# Patient Record
Sex: MALE | Race: WHITE | NOT HISPANIC OR LATINO | Employment: OTHER | URBAN - METROPOLITAN AREA
[De-identification: names, ages, dates, MRNs, and addresses within clinical notes are randomized per-mention and may not be internally consistent; named-entity substitution may affect disease eponyms.]

---

## 2017-02-01 ENCOUNTER — OFFICE VISIT (OUTPATIENT)
Dept: CARDIOLOGY | Facility: CLINIC | Age: 72
End: 2017-02-01

## 2017-02-01 VITALS
HEART RATE: 77 BPM | WEIGHT: 198.4 LBS | BODY MASS INDEX: 28.4 KG/M2 | HEIGHT: 70 IN | SYSTOLIC BLOOD PRESSURE: 160 MMHG | DIASTOLIC BLOOD PRESSURE: 84 MMHG

## 2017-02-01 DIAGNOSIS — I48.0 PAROXYSMAL ATRIAL FIBRILLATION (HCC): ICD-10-CM

## 2017-02-01 DIAGNOSIS — I25.9 ISCHEMIC HEART DISEASE: ICD-10-CM

## 2017-02-01 DIAGNOSIS — I25.810 ATHEROSCLEROSIS OF CORONARY ARTERY BYPASS GRAFT OF NATIVE HEART WITHOUT ANGINA PECTORIS: Primary | ICD-10-CM

## 2017-02-01 DIAGNOSIS — E78.5 HYPERLIPIDEMIA, UNSPECIFIED HYPERLIPIDEMIA TYPE: ICD-10-CM

## 2017-02-01 DIAGNOSIS — I48.0 PAF (PAROXYSMAL ATRIAL FIBRILLATION) (HCC): ICD-10-CM

## 2017-02-01 DIAGNOSIS — I10 ESSENTIAL HYPERTENSION: ICD-10-CM

## 2017-02-01 PROCEDURE — 99213 OFFICE O/P EST LOW 20 MIN: CPT | Performed by: INTERNAL MEDICINE

## 2017-02-01 RX ORDER — ASCORBIC ACID 500 MG
1000 TABLET ORAL DAILY
COMMUNITY
End: 2019-02-06

## 2017-02-01 RX ORDER — PRAVASTATIN SODIUM 80 MG/1
80 TABLET ORAL DAILY
Qty: 90 TABLET | Refills: 2 | Status: SHIPPED | OUTPATIENT
Start: 2017-02-01 | End: 2017-10-27 | Stop reason: SDUPTHER

## 2017-02-01 RX ORDER — METOPROLOL SUCCINATE 50 MG/1
50 TABLET, EXTENDED RELEASE ORAL DAILY
Qty: 90 TABLET | Refills: 2 | Status: SHIPPED | OUTPATIENT
Start: 2017-02-01 | End: 2017-04-14 | Stop reason: SDUPTHER

## 2017-02-01 NOTE — PROGRESS NOTES
Subjective:     Encounter Date:02/01/2017      Patient ID: Fidencio Zarate Jr. is a 71 y.o.  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     INTERNIST: Marlo King MD, [facility]  ONCOLOGIST: Fawad Zelaya MD  OTOLARYNGOLOGIST: Barrie Solano MD  RADIATION ONCOLOGIST: Kenneth Rodriguez MD  PAIN MANAGEMENT: Toni Pollock MD  SURGEON: Son Andrade MD  INTERVENTIONAL CARDIOLOGIST: Son Lam MD, [facility], [facility]    Chief Complaint:   Chief Complaint   Patient presents with   • Follow-up     Problem List:  1. Ischemic heart disease:  a. Remote acute inferolateral myocardial infarction with apparent severe single vessel obstructive coronary disease and preserved left ventricular function--data deficit, October 1984.   b. Recurrent myocardial infarction--data deficit, 1989 with subsequent recurrent progressive chest pain syndrome and aortocoronary bypass graft placement x6--data deficit, January 1990.  c. Remote acceptable nondiagnostic GXT without symptoms, March 1995.   d. Progressive chest pain syndrome with diagnostic coronary arteriography demonstrating 3-vessel obstructive coronary disease with proximal segment occlusion with incomplete visualization of the LIMA-LAD and high grade stenosis of the vein graft of the first diagonal branch of the left anterior descending artery with occluded vein graft to the left circumflex coronary artery with patent graft to the distal PDA with mild left ventricular enlargement and mild-moderate inferobasal hypokinesis with angioplasty and stent deployment of the diagonal branch of the LAD, [facility], May 2007.  e. Abnormal acceptable combination Doppler echocardiogram, LVEF (0.55) with abnormal acceptable 24-hour Holter monitor, autumn 2009.  f. Recent progressive CCS class II-III chest pain syndrome with NYHA class II exertional dyspnea and diagnostic coronary angiography demonstrating normal left ventricular function with severe three vessel  obstructive disease and patent LIMA-LAD with patent stents vein graft to the D1-LAD and patent vein graft to the RCA with chronically occluded circumflex vein graft with adequate right and left collaterals of the left circumflex coronary artery with coronary anatomy remaining unchanged from remote intervention, 2007.   g. Residual class I symptoms.  h. Left heart cath, 06/17/2016, Dr. Son Lam, with normal hemodynamics, moderate reduction in LVEF (0.40) with severe 3 vessel obstructive coronary disease with occlusion of all coronary arteries and patent LIMA and SVG x2 with continued medical therapy felt warranted.  i. Echocardiogram 10/6/2016; LVEF 0.50, LV wall segment contract abnormally, RV moderately dilated, LA moderately dilated, mild concentric hypertrophy, LV moderately dilated, no evidence of pericardial effusion, RVSP less than 35 mmHg  j. Residual class I symptoms.  2. Chronic hypertension--probably essential with recent labile blood pressure readings.   3. Dyslipidemia.   4. Chronic tobacco abuse.  5. Mild obesity, resolved.  6. Chronic lower tract obstructive symptoms--probable BPH.  7. Hyperuricemia with intermittent gout.   8. Vitamin D deficiency.   9. Chronic low back pain with neurosurgical evaluation.   10. Erectile dysfunction.   11. Elevated serum glucose--possible type 2 pre-diabetes mellitus.  12. Remote operations:  a. Right foot surgery, 1962.   b. Appendectomy.  c. Coronary artery bypass graft x6 - 1990.   d. Cataract removal with lens implant, [facility], 2009.  e. Cholecystectomy July 2016  13. Recent left neck mass with abnormal PET scan demonstrating 3.5 cm. x 2.5 cm. mass with adjacent 9 mm lymph node, hypermetabolic, with contemplated radiation/chemotherapy vs. resection, January 2011.  14. Noncompliance, resolved.   15. Recent progressive disabling headache syndrome felt to be secondary to cluster headaches with headache specialist evaluation, MRI and ophthalmology assessment x2 - data  "deficit, June-December 2015.  16. Recent progressive nausea and chest pain syndrome with apparent acceptable diagnostic coronary angiography and ERCP followed by laparoscopic cholecystectomy and liver biopsy, 07/08/2016.     Allergies   Allergen Reactions   • Penicillins Rash and Other (See Comments)     \"flu-like\" symptoms   • Crestor [Rosuvastatin Calcium] Other (See Comments) and Myalgia     Arthralgias   • Lipitor [Atorvastatin] Other (See Comments) and Myalgia     Arthralgias   • Zocor [Simvastatin] Other (See Comments) and Myalgia     Arthralgias         Current Outpatient Prescriptions:   •  allopurinol (ZYLOPRIM) 300 MG tablet, Take 300 mg by mouth daily., Disp: , Rfl:   •  amLODIPine (NORVASC) 10 MG tablet, Take 10 mg by mouth daily., Disp: , Rfl:   •  apixaban (ELIQUIS) 5 MG tablet tablet, Take 1 tablet by mouth 2 (Two) Times a Day., Disp: 180 tablet, Rfl: 3  •  aspirin 81 MG chewable tablet, Chew 81 mg daily., Disp: , Rfl:   •  Cholecalciferol (VITAMIN D3) 5000 UNITS capsule capsule, Take 5,000 Units by mouth daily., Disp: , Rfl:   •  citalopram (CeleXA) 20 MG tablet, Take 20 mg by mouth daily., Disp: , Rfl:   •  fentaNYL (DURAGESIC) 75 MCG/HR patch, Place 1 patch on the skin every third day., Disp: , Rfl:   •  gabapentin (NEURONTIN) 300 MG capsule, Take 300 mg by mouth 3 (three) times a day., Disp: , Rfl:   •  hydrochlorothiazide (HYDRODIURIL) 25 MG tablet, Take 25 mg by mouth daily., Disp: , Rfl:   •  HYDROcodone-acetaminophen (LORTAB)  MG per tablet, Take 1 tablet by mouth every 6 (six) hours as needed for moderate pain (4-6)., Disp: , Rfl:   •  levothyroxine (SYNTHROID, LEVOTHROID) 50 MCG tablet, Take 50 mcg by mouth daily., Disp: , Rfl:   •  LUBRICANT EYE DROPS 0.4-0.3 % solution ophthalmic solution, Administer 1 drop to both eyes at night as needed., Disp: , Rfl:   •  metoprolol succinate XL (TOPROL-XL) 25 MG 24 hr tablet, Take 1 tablet by mouth daily., Disp: 30 tablet, Rfl: 3  •  naproxen " "sodium (ALEVE) 220 MG tablet, Take 440 mg by mouth at night as needed for mild pain (1-3)., Disp: , Rfl:   •  nitroglycerin (NITROSTAT) 0.4 MG SL tablet, Place 0.4 mg under the tongue as needed., Disp: , Rfl:   •  omeprazole (PriLOSEC) 20 MG capsule, Take 20 mg by mouth daily., Disp: , Rfl:   •  pravastatin (PRAVACHOL) 80 MG tablet, Take 80 mg by mouth daily., Disp: , Rfl:   •  tamsulosin (FLOMAX) 0.4 MG capsule 24 hr capsule, Take 1 capsule by mouth every night., Disp: , Rfl:   •  vitamin B-12 (CYANOCOBALAMIN) 1000 MCG tablet, Take 1,000 mcg by mouth daily., Disp: , Rfl:     History of Present Illness Patient returns for scheduled 6-month followup.  Patient denies any chest discomfort, shortness of breath, fatigue, edema, palpitations, presyncope.  He states that since he last saw as he has had gallstones, had his gallbladder removed, had a colonoscopy but no polyp removal.  He has no difficulties eating, no nausea, vomiting, diarrhea, abdominal pain.  He had tried 2 rounds of Repatha but it is too expensive so he went back to pravastatin daily.  He needs a refill today.  No tobacco or NTG use.  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  He has no current GI or  difficulty.      ROS   Obtained and otherwise negative except as outlined in problem list and HPI.    Procedures       Objective:       Vitals:    02/01/17 1307 02/01/17 1309   BP: 161/95 160/84   BP Location: Left arm Left arm   Patient Position: Sitting Standing   Pulse: 77 77   Weight: 198 lb 6.4 oz (90 kg)    Height: 69.5\" (176.5 cm)    Recheck blood pressure left arm sitting 140/62  Body mass index is 28.88 kg/(m^2).   Last weight:  189 lbs.    Physical Exam   Constitutional: He appears well-developed and well-nourished.   HENT:   Head: Normocephalic and atraumatic.   Mouth/Throat: Oropharynx is clear and moist.   Neck: Neck supple. No JVD present. Carotid bruit is not present. No thyromegaly present.   Cardiovascular: Normal rate.  An irregular rhythm " present.  Occasional extrasystoles are present. Exam reveals no gallop, no S3 and no friction rub.    Murmur heard.   Medium-pitched early systolic murmur is present with a grade of 2/6  at the lower left sternal border  Pulses:       Carotid pulses are 1+ on the right side, and 1+ on the left side.       Radial pulses are 1+ on the right side, and 1+ on the left side.        Femoral pulses are 1+ on the right side, and 1+ on the left side.       Popliteal pulses are 1+ on the right side, and 1+ on the left side.        Dorsalis pedis pulses are 1+ on the right side, and 1+ on the left side.        Posterior tibial pulses are 1+ on the right side, and 1+ on the left side.   Pulmonary/Chest: Effort normal and breath sounds normal.   Abdominal: Soft. He exhibits no mass. There is no hepatosplenomegaly. There is no tenderness.   Lymphadenopathy:     He has no cervical adenopathy.   Neurological: He is alert. He has normal strength. No cranial nerve deficit or sensory deficit.   Skin: Skin is warm, dry and intact.       Lab Review:   Lab Results   Component Value Date    GLUCOSE 109 (H) 06/16/2016    BUN 18 06/16/2016    CREATININE 1.00 06/16/2016    EGFRIFNONA 74 06/16/2016    BCR 18.0 06/16/2016    CO2 28.0 06/16/2016    CALCIUM 9.3 06/16/2016    ALBUMIN 3.60 06/17/2016    LABIL2 1.3 06/16/2016    AST 49 (H) 06/17/2016    ALT 34 06/17/2016       Lab Results   Component Value Date    WBC 4.54 07/07/2016    HGB 13.0 (L) 07/07/2016    HCT 38.3 (L) 07/07/2016    MCV 92.5 07/07/2016     07/07/2016       Lab Results   Component Value Date    HGBA1C 5.50 06/16/2016       Lab Results   Component Value Date    TSH 1.799 06/16/2016       Lab Results   Component Value Date    CHOL 151 06/16/2016     Lab Results   Component Value Date    TRIG 85 06/16/2016     Lab Results   Component Value Date    HDL 46 06/16/2016   LDL 64      Assessment:   Overall continued acceptable course with no interim cardiopulmonary complaints  with good functional status. We will defer additional diagnostic or therapeutic intervention from a cardiac perspective at this time.  We'll increase his Toprol XL to 50 mg daily to help with hypertension as well as his paroxysmal atrial fibrillation.       Diagnosis Plan   1. Atherosclerosis of coronary artery bypass graft of native heart without angina pectoris     2. Essential hypertension     3. Hyperlipidemia, unspecified hyperlipidemia type     4. Ischemic heart disease     5. PAF (paroxysmal atrial fibrillation)            Plan:         1. Patient to continue current medications and close follow up with the above providers.  2. Tentative cardiology follow up in 6 months or patient may return sooner PRN.  3. Refill pravastatin  4. Increase Toprol XL to 50 mg daily       Scribed by Reyna Gonzalez, SARAH SCANLON, Darien Rodrigues MD, [facility], personally performed the services described in this documentation as scribed by the above named individual in my presence, and it is both accurate and complete. At 3:09 PM on 02/01/2017

## 2017-04-07 ENCOUNTER — HOSPITAL ENCOUNTER (EMERGENCY)
Facility: HOSPITAL | Age: 72
Discharge: HOME | End: 2017-04-07
Attending: EMERGENCY MEDICINE | Admitting: EMERGENCY MEDICINE

## 2017-04-07 ENCOUNTER — APPOINTMENT (OUTPATIENT)
Dept: GENERAL RADIOLOGY | Facility: HOSPITAL | Age: 72
End: 2017-04-07

## 2017-04-07 ENCOUNTER — TELEPHONE (OUTPATIENT)
Dept: CARDIOLOGY | Facility: CLINIC | Age: 72
End: 2017-04-07

## 2017-04-07 VITALS
TEMPERATURE: 97.6 F | OXYGEN SATURATION: 91 % | RESPIRATION RATE: 15 BRPM | HEIGHT: 69 IN | WEIGHT: 205 LBS | HEART RATE: 46 BPM | BODY MASS INDEX: 30.36 KG/M2 | DIASTOLIC BLOOD PRESSURE: 96 MMHG | SYSTOLIC BLOOD PRESSURE: 143 MMHG

## 2017-04-07 DIAGNOSIS — Z86.79 HISTORY OF CORONARY ARTERY DISEASE: ICD-10-CM

## 2017-04-07 DIAGNOSIS — I50.21 ACUTE SYSTOLIC CONGESTIVE HEART FAILURE (HCC): Primary | ICD-10-CM

## 2017-04-07 DIAGNOSIS — R07.89 ATYPICAL CHEST PAIN: ICD-10-CM

## 2017-04-07 LAB
ALBUMIN SERPL-MCNC: 4.5 G/DL (ref 3.2–4.8)
ALBUMIN/GLOB SERPL: 1.5 G/DL (ref 1.5–2.5)
ALP SERPL-CCNC: 57 U/L (ref 25–100)
ALT SERPL W P-5'-P-CCNC: 5 U/L (ref 7–40)
ANION GAP SERPL CALCULATED.3IONS-SCNC: 6 MMOL/L (ref 3–11)
AST SERPL-CCNC: 23 U/L (ref 0–33)
BASOPHILS # BLD AUTO: 0.05 10*3/MM3 (ref 0–0.2)
BASOPHILS NFR BLD AUTO: 0.6 % (ref 0–1)
BILIRUB SERPL-MCNC: 0.6 MG/DL (ref 0.3–1.2)
BNP SERPL-MCNC: 541 PG/ML (ref 0–100)
BUN BLD-MCNC: 12 MG/DL (ref 9–23)
BUN/CREAT SERPL: 9.2 (ref 7–25)
CALCIUM SPEC-SCNC: 9.8 MG/DL (ref 8.7–10.4)
CHLORIDE SERPL-SCNC: 97 MMOL/L (ref 99–109)
CO2 SERPL-SCNC: 30 MMOL/L (ref 20–31)
CREAT BLD-MCNC: 1.3 MG/DL (ref 0.6–1.3)
DEPRECATED RDW RBC AUTO: 53.2 FL (ref 37–54)
EOSINOPHIL # BLD AUTO: 0.23 10*3/MM3 (ref 0.1–0.3)
EOSINOPHIL NFR BLD AUTO: 2.8 % (ref 0–3)
ERYTHROCYTE [DISTWIDTH] IN BLOOD BY AUTOMATED COUNT: 15.3 % (ref 11.3–14.5)
GFR SERPL CREATININE-BSD FRML MDRD: 55 ML/MIN/1.73
GLOBULIN UR ELPH-MCNC: 3.1 GM/DL
GLUCOSE BLD-MCNC: 114 MG/DL (ref 70–100)
HCT VFR BLD AUTO: 37.5 % (ref 38.9–50.9)
HGB BLD-MCNC: 12.1 G/DL (ref 13.1–17.5)
HOLD SPECIMEN: NORMAL
HOLD SPECIMEN: NORMAL
IMM GRANULOCYTES # BLD: 0.02 10*3/MM3 (ref 0–0.03)
IMM GRANULOCYTES NFR BLD: 0.2 % (ref 0–0.6)
LYMPHOCYTES # BLD AUTO: 1.85 10*3/MM3 (ref 0.6–4.8)
LYMPHOCYTES NFR BLD AUTO: 22.3 % (ref 24–44)
MAGNESIUM SERPL-MCNC: 2.2 MG/DL (ref 1.3–2.7)
MCH RBC QN AUTO: 30.5 PG (ref 27–31)
MCHC RBC AUTO-ENTMCNC: 32.3 G/DL (ref 32–36)
MCV RBC AUTO: 94.5 FL (ref 80–99)
MONOCYTES # BLD AUTO: 0.91 10*3/MM3 (ref 0–1)
MONOCYTES NFR BLD AUTO: 11 % (ref 0–12)
NEUTROPHILS # BLD AUTO: 5.23 10*3/MM3 (ref 1.5–8.3)
NEUTROPHILS NFR BLD AUTO: 63.1 % (ref 41–71)
PLATELET # BLD AUTO: 188 10*3/MM3 (ref 150–450)
PMV BLD AUTO: 9.7 FL (ref 6–12)
POTASSIUM BLD-SCNC: 4.2 MMOL/L (ref 3.5–5.5)
PROT SERPL-MCNC: 7.6 G/DL (ref 5.7–8.2)
RBC # BLD AUTO: 3.97 10*6/MM3 (ref 4.2–5.76)
SODIUM BLD-SCNC: 133 MMOL/L (ref 132–146)
TROPONIN I SERPL-MCNC: 0 NG/ML (ref 0–0.07)
WBC NRBC COR # BLD: 8.29 10*3/MM3 (ref 3.5–10.8)
WHOLE BLOOD HOLD SPECIMEN: NORMAL
WHOLE BLOOD HOLD SPECIMEN: NORMAL

## 2017-04-07 PROCEDURE — 80053 COMPREHEN METABOLIC PANEL: CPT | Performed by: EMERGENCY MEDICINE

## 2017-04-07 PROCEDURE — 85025 COMPLETE CBC W/AUTO DIFF WBC: CPT | Performed by: EMERGENCY MEDICINE

## 2017-04-07 PROCEDURE — 71010 HC CHEST PA OR AP: CPT

## 2017-04-07 PROCEDURE — 83735 ASSAY OF MAGNESIUM: CPT | Performed by: EMERGENCY MEDICINE

## 2017-04-07 PROCEDURE — 83880 ASSAY OF NATRIURETIC PEPTIDE: CPT | Performed by: EMERGENCY MEDICINE

## 2017-04-07 PROCEDURE — 99284 EMERGENCY DEPT VISIT MOD MDM: CPT

## 2017-04-07 PROCEDURE — 84484 ASSAY OF TROPONIN QUANT: CPT

## 2017-04-07 PROCEDURE — 93005 ELECTROCARDIOGRAM TRACING: CPT

## 2017-04-07 RX ORDER — FUROSEMIDE 20 MG/1
TABLET ORAL
Qty: 30 TABLET | Refills: 0 | Status: SHIPPED | OUTPATIENT
Start: 2017-04-07 | End: 2017-08-09

## 2017-04-07 RX ORDER — FUROSEMIDE 40 MG/1
40 TABLET ORAL ONCE
Status: COMPLETED | OUTPATIENT
Start: 2017-04-07 | End: 2017-04-07

## 2017-04-07 RX ORDER — SODIUM CHLORIDE 0.9 % (FLUSH) 0.9 %
10 SYRINGE (ML) INJECTION AS NEEDED
Status: DISCONTINUED | OUTPATIENT
Start: 2017-04-07 | End: 2017-04-07 | Stop reason: HOSPADM

## 2017-04-07 RX ADMIN — FUROSEMIDE 40 MG: 40 TABLET ORAL at 16:58

## 2017-04-07 NOTE — TELEPHONE ENCOUNTER
Patient is having labored breathing and wife wants  to go to ER. Patient wants an appointment to see Dr. Rodrigues today instead. Patient not willing to go to the ER and states it is not too bad. Patient is remarkably short of breath over the phone. Patient given phone number to Afib clinic but strongly advised to go to ER.

## 2017-04-07 NOTE — ED PROVIDER NOTES
"Subjective   HPI Comments: 71 y.o. male presents to the ED w/ c/o SOA starting about 1 week ago. Pt has a history of a-fib on Eliquis. He states over the last week he has felt very SOA, which is worse with lying down. He describes a pressure in his central chest that is also present when he lies down. He has had a cough for about 2 weeks.    Pt called a-fib clinic today. They were unable to see him today and he was advised to present to the ED for further evaluation.    Patient is a 71 y.o. male presenting with shortness of breath.   History provided by:  Patient  Shortness of Breath   Severity:  Severe  Onset quality:  Gradual  Duration:  1 week  Timing:  Constant  Progression:  Waxing and waning  Chronicity:  New  Relieved by:  Nothing  Exacerbated by: lying down.  Ineffective treatments:  None tried  Associated symptoms: chest pain (pressure) and cough    Associated symptoms: no diaphoresis, no fever, no sore throat and no vomiting        Review of Systems   Constitutional: Negative for chills, diaphoresis and fever.   HENT: Negative for sore throat.    Respiratory: Positive for cough, chest tightness and shortness of breath.    Cardiovascular: Positive for chest pain (pressure).   Gastrointestinal: Negative for vomiting.   All other systems reviewed and are negative.      Past Medical History:   Diagnosis Date   • Arthritis    • Cancer     thoat cancer   • Coronary artery disease    • Hyperlipidemia    • Hypertension    • Injury of back    • Low back pain    • Myocardial infarction     x2   • PAF (paroxysmal atrial fibrillation)    • Uses hearing aid     bilat        Allergies   Allergen Reactions   • Penicillins Rash and Other (See Comments)     \"flu-like\" symptoms   • Crestor [Rosuvastatin Calcium] Other (See Comments) and Myalgia     Arthralgias   • Lipitor [Atorvastatin] Other (See Comments) and Myalgia     Arthralgias   • Zocor [Simvastatin] Other (See Comments) and Myalgia     Arthralgias       Past " Surgical History:   Procedure Laterality Date   • APPENDECTOMY     • CARDIAC CATHETERIZATION      x1 stent   • CARDIAC CATHETERIZATION N/A 6/17/2016    Procedure: Left Heart Cath;  Surgeon: Son Lam MD;  Location:  [facility];  Service:    • CARDIAC SURGERY     • CATARACT EXTRACTION  2009   • CHOLECYSTECTOMY LAPAROSCOPIC INTRAOPERATIVE CHOLANGIOGRAM WITH LIVER BIOPSY N/A 7/8/2016    Procedure: CHOLECYSTECTOMY LAPAROSCOPIC WITH LIVER BIOPSY;  Surgeon: Son Andrade MD;  Location:  [facility];  Service:    • COLONOSCOPY      x2   • FOOT SURGERY  1962    right    • LASIK     • [place] ERCP DX COLLECTION SPECIMEN BRUSHING/WASHING N/A 6/24/2016    Procedure: ENDOSCOPIC RETROGRADE CHOLANGIOPANCREATOGRAPHY;  Surgeon: Omari Cardenas MD;  Location:  [facility];  Service: Gastroenterology   • TONSILLECTOMY     • WISDOM TOOTH EXTRACTION         Family History   Problem Relation Age of Onset   • COPD Mother        Social History     Social History   • Marital status:      Spouse name: N/A   • Number of children: N/A   • Years of education: N/A     Social History Main Topics   • Smoking status: Former Smoker     Packs/day: 1.50     Years: 50.00     Types: Cigarettes     Quit date: 6/15/2010   • Smokeless tobacco: Never Used   • Alcohol use Yes      Comment: beer occasional   • Drug use: No   • Sexual activity: Defer     Other Topics Concern   • None     Social History Narrative         Objective   Physical Exam   Constitutional: He is oriented to person, place, and time. He appears well-developed and well-nourished. No distress.   HENT:   Head: Normocephalic and atraumatic.   Eyes: Conjunctivae are normal. Pupils are equal, round, and reactive to light.   Neck: Normal range of motion. Neck supple. No JVD present.   Cardiovascular:   Murmur (2/6 systolic murmur) heard.  Pulmonary/Chest: Effort normal and breath sounds normal. No respiratory distress. He has no wheezes. He has  no rales. He exhibits no tenderness.   Abdominal: Soft. Bowel sounds are normal. There is no tenderness.   Musculoskeletal: He exhibits edema (trace pitting edema bilaterally).   Neurological: He is alert and oriented to person, place, and time.   Skin: Skin is warm and dry. He is not diaphoretic.   Psychiatric: He has a normal mood and affect. His behavior is normal.   Nursing note and vitals reviewed.      Procedures         ED Course  ED Course   Comment By Time   I spoke with Dr. Potter who is on-call for cardiology.  We reviewed his echocardiogram in October and his catheter report from June.  Dr. Potter recommended continuing his HCTZ and adding 20 mg of Lasix for the next 3 days.  He wanted him to then follow up in the CHF clinic.  I discussed this all with Mr. Soto his wife. Xavier Sanches MD 04/07 1906       Recent Results (from the past 24 hour(s))   Comprehensive Metabolic Panel    Collection Time: 04/07/17  3:36 PM   Result Value Ref Range    Glucose 114 (H) 70 - 100 mg/dL    BUN 12 9 - 23 mg/dL    Creatinine 1.30 0.60 - 1.30 mg/dL    Sodium 133 132 - 146 mmol/L    Potassium 4.2 3.5 - 5.5 mmol/L    Chloride 97 (L) 99 - 109 mmol/L    CO2 30.0 20.0 - 31.0 mmol/L    Calcium 9.8 8.7 - 10.4 mg/dL    Total Protein 7.6 5.7 - 8.2 g/dL    Albumin 4.50 3.20 - 4.80 g/dL    ALT (SGPT) 5 (L) 7 - 40 U/L    AST (SGOT) 23 0 - 33 U/L    Alkaline Phosphatase 57 25 - 100 U/L    Total Bilirubin 0.6 0.3 - 1.2 mg/dL    eGFR Non African Amer 55 (L) >60 mL/min/1.73    Globulin 3.1 gm/dL    A/G Ratio 1.5 1.5 - 2.5 g/dL    BUN/Creatinine Ratio 9.2 7.0 - 25.0    Anion Gap 6.0 3.0 - 11.0 mmol/L   Magnesium    Collection Time: 04/07/17  3:36 PM   Result Value Ref Range    Magnesium 2.2 1.3 - 2.7 mg/dL   Light Blue Top    Collection Time: 04/07/17  3:36 PM   Result Value Ref Range    Extra Tube hold for add-on    Green Top (Gel)    Collection Time: 04/07/17  3:36 PM   Result Value Ref Range    Extra Tube Hold for add-ons.     Lavender Top    Collection Time: 04/07/17  3:36 PM   Result Value Ref Range    Extra Tube hold for add-on    Gold Top - SST    Collection Time: 04/07/17  3:36 PM   Result Value Ref Range    Extra Tube Hold for add-ons.    CBC Auto Differential    Collection Time: 04/07/17  3:36 PM   Result Value Ref Range    WBC 8.29 3.50 - 10.80 10*3/mm3    RBC 3.97 (L) 4.20 - 5.76 10*6/mm3    Hemoglobin 12.1 (L) 13.1 - 17.5 g/dL    Hematocrit 37.5 (L) 38.9 - 50.9 %    MCV 94.5 80.0 - 99.0 fL    MCH 30.5 27.0 - 31.0 pg    MCHC 32.3 32.0 - 36.0 g/dL    RDW 15.3 (H) 11.3 - 14.5 %    RDW-SD 53.2 37.0 - 54.0 fl    MPV 9.7 6.0 - 12.0 fL    Platelets 188 150 - 450 10*3/mm3    Neutrophil % 63.1 41.0 - 71.0 %    Lymphocyte % 22.3 (L) 24.0 - 44.0 %    Monocyte % 11.0 0.0 - 12.0 %    Eosinophil % 2.8 0.0 - 3.0 %    Basophil % 0.6 0.0 - 1.0 %    Immature Grans % 0.2 0.0 - 0.6 %    Neutrophils, Absolute 5.23 1.50 - 8.30 10*3/mm3    Lymphocytes, Absolute 1.85 0.60 - 4.80 10*3/mm3    Monocytes, Absolute 0.91 0.00 - 1.00 10*3/mm3    Eosinophils, Absolute 0.23 0.10 - 0.30 10*3/mm3    Basophils, Absolute 0.05 0.00 - 0.20 10*3/mm3    Immature Grans, Absolute 0.02 0.00 - 0.03 10*3/mm3   BNP    Collection Time: 04/07/17  3:36 PM   Result Value Ref Range    .0 (H) 0.0 - 100.0 pg/mL   POC Troponin, Rapid    Collection Time: 04/07/17  3:38 PM   Result Value Ref Range    Troponin I 0.00 0.00 - 0.07 ng/mL     Note: In addition to lab results from this visit, the labs listed above may include labs taken at another facility or during a different encounter within the last 24 hours. Please correlate lab times with ED admission and discharge times for further clarification of the services performed during this visit.    XR Chest 1 View   Final Result   Cardiomegaly is noted with minimal perihilar vascular   congestive changes and mild interstitial and granulomatous scarring.   Otherwise, peripheral edema, free fluid, consolidation or active disease  "  is not identified.       D:  04/07/2017   E:  04/07/2017       This report was finalized on 4/7/2017 4:37 PM by Dr. Brian King MD.            Vitals:    04/07/17 1528 04/07/17 1659 04/07/17 1800 04/07/17 1915   BP: 171/75 145/75 135/73 143/96   BP Location: Left arm Left arm  Left arm   Patient Position: Sitting Lying  Lying   Pulse: 54 (!) 45  (!) 46   Resp: 16 16  15   Temp: 97.8 °F (36.6 °C)   97.6 °F (36.4 °C)   TempSrc: Oral   Oral   SpO2: 96% 96%  91%   Weight: 205 lb (93 kg)      Height: 69\" (175.3 cm)        Medications   furosemide (LASIX) tablet 40 mg (40 mg Oral Given 4/7/17 1658)     ECG/EMG Results (last 24 hours)     Procedure Component Value Units Date/Time    ECG 12 Lead [[id number]] Collected:  04/07/17 1533     Updated:  04/07/17 1534                      MDM  Number of Diagnoses or Management Options  Acute systolic congestive heart failure: new and requires workup  Atypical chest pain: new and requires workup  History of coronary artery disease: established and worsening     Amount and/or Complexity of Data Reviewed  Clinical lab tests: ordered and reviewed  Tests in the radiology section of CPT®: ordered and reviewed  Review and summarize past medical records: yes  Discuss the patient with other providers: yes  Independent visualization of images, tracings, or specimens: yes    Patient Progress  Patient progress: improved      Final diagnoses:   Acute systolic congestive heart failure   Atypical chest pain   History of coronary artery disease       Documentation assistance provided by deandra Crane.  Information recorded by the deandra was done at my direction and has been verified and validated by me.     To Crane  04/07/17 1644       Xavier Sanches MD  04/08/17 [id number]    "

## 2017-04-07 NOTE — DISCHARGE INSTRUCTIONS
Call the CHF clinic Monday and asked to be seen that day.  Also call Dr. Rodrigues on Monday and arrange follow-up with him.  At any point if your breathing or discomfort should worsen return to the emergency department.  Take the Lasix which I have prescribed every morning for the next 3 mornings.  Keep the remainder to use as needed as directed by your cardiologist.

## 2017-04-14 ENCOUNTER — CONVERSION ENCOUNTER (OUTPATIENT)
Dept: CARDIOLOGY | Facility: HOSPITAL | Age: 72
End: 2017-04-14

## 2017-04-14 ENCOUNTER — OFFICE VISIT (OUTPATIENT)
Dept: CARDIOLOGY | Facility: HOSPITAL | Age: 72
End: 2017-04-14

## 2017-04-14 VITALS
WEIGHT: 202 LBS | OXYGEN SATURATION: 100 % | HEIGHT: 69 IN | TEMPERATURE: 97.5 F | BODY MASS INDEX: 29.92 KG/M2 | HEART RATE: 46 BPM | SYSTOLIC BLOOD PRESSURE: 124 MMHG | DIASTOLIC BLOOD PRESSURE: 66 MMHG | RESPIRATION RATE: 18 BRPM

## 2017-04-14 DIAGNOSIS — I48.0 PAROXYSMAL ATRIAL FIBRILLATION (HCC): ICD-10-CM

## 2017-04-14 DIAGNOSIS — R00.1 BRADYCARDIA: ICD-10-CM

## 2017-04-14 DIAGNOSIS — I25.9 ISCHEMIC HEART DISEASE: ICD-10-CM

## 2017-04-14 DIAGNOSIS — I50.9 ACUTE HEART FAILURE, UNSPECIFIED HEART FAILURE TYPE (HCC): Primary | ICD-10-CM

## 2017-04-14 DIAGNOSIS — I10 ESSENTIAL HYPERTENSION: ICD-10-CM

## 2017-04-14 PROCEDURE — 99213 OFFICE O/P EST LOW 20 MIN: CPT | Performed by: NURSE PRACTITIONER

## 2017-04-14 RX ORDER — METOPROLOL SUCCINATE 25 MG/1
25 TABLET, EXTENDED RELEASE ORAL DAILY
Qty: 90 TABLET | Refills: 3 | Status: SHIPPED | OUTPATIENT
Start: 2017-04-14 | End: 2018-09-19 | Stop reason: SDUPTHER

## 2017-04-14 NOTE — PROGRESS NOTES
[facility]  [facility]      Encounter Date:04/14/2017     Fidencio Zarate Jr.  [address] [address]  [phone]    1945    Marlo King MD    Fidencio Zarate Jr. is a 71 y.o. male.      Subjective:     Chief Complaint:  Follow-up (dypnea, fatigue)       HPI     Pt with PAF, IHD presented to ED 04/07/17 with dyspnea.  IN ED reported: Severity: Severe Onset quality: Gradual Duration: 1 week Timing: Constant Progression: Waxing and waning Chronicity: New Relieved by: Nothing Exacerbated by: lying down. Ineffective treatments: None tried Associated symptoms: chest pain (pressure) and cough  Associated symptoms: no diaphoresis, no fever, no sore throat and no vomiting     He was found to have .  He was given Lasix 20 mg daily for 3 days.  Pt reports today that dyspnea has resolved.  Lost 3-4 lbs.  Continues to feel fatigue (moderate at all times). Denies CP, pressure, palpitations, dizziness, syncope, edema.     Patient Active Problem List    Diagnosis   • PAF (paroxysmal atrial fibrillation) [I48.0]     Overview Note:     Diagnosed 09/2016.  Chadsvasc 3     • Ischemic heart disease [I25.9]     Overview Note:     a. Remote acute inferolateral myocardial infarction with apparent severe single vessel obstructive coronary disease and preserved left ventricular function--data deficit, October 1984.   b. Recurrent myocardial infarction--data deficit, 1989 with subsequent recurrent progressive chest pain syndrome and aortocoronary bypass graft placement x6--data deficit, January 1990.  c. Remote acceptable nondiagnostic GXT without symptoms, March 1995.   d. Progressive chest pain syndrome with diagnostic coronary arteriography demonstrating 3-vessel obstructive coronary disease with proximal segment occlusion with incomplete visualization of the LIMA-LAD and high grade stenosis of the vein graft of the first diagonal branch of the left anterior descending artery  with occluded vein graft to the left circumflex coronary artery with patent graft to the distal PDA with mild left ventricular enlargement and mild-moderate inferobasal hypokinesis with angioplasty and stent deployment of the diagonal branch of the LAD, [facility], May 2007.  e. Abnormal acceptable combination Doppler echocardiogram, LVEF (0.55) with abnormal acceptable 24-hour Holter monitor, autumn 2009.   f. Recent progressive CCS class II-III chest pain syndrome with NYHA class II exertional dyspnea and diagnostic coronary angiography demonstrating normal left ventricular function with severe three vessel obstructive disease and patent LIMA-LAD with patent stents vein graft to the D1-LAD and patent vein graft to the RCA with chronically occluded circumflex vein graft with adequate right and left collaterals of the left circumflex coronary artery with coronary anatomy remaining unchanged from remote intervention, 2007.    g. Residual class I symptoms.         F.   Echocardiogram 10/6/2016: EF 50%, RV moderately dilated, LA moderately dilated, LV moderately dilated, mild concentric LVH, RVSP less than 35 mmHg.     • Tobacco use [Z72.0]   • Mild obesity [E66.9]   • BPH with obstruction/lower urinary tract symptoms [N40.1, N13.8]   • Vitamin D deficiency [E55.9]   • Erectile dysfunction [N52.9]   • Elevated serum glucose [R73.9]     Overview Note:     Possible type 2 pre-diabetes mellitus.     • Headache syndrome [G44.89]     Overview Note:     2. Recent progressive disabling headache syndrome felt to be secondary to cluster headaches with headache specialist evaluation, MRI and ophthalmology assessment x2 - data deficit, June-December 2015.       • Calculus of bile duct without obstruction, cholangitis, or cholecystitis [K80.50]   • Gastroesophageal reflux disease [K21.9]   • Fatigue [R53.83]   • Hyperlipidemia [E78.5]   • Hypogonadism in male [E29.1]   • Malignant neoplasm of larynx [C32.9]     Overview  "Note:     Description: actually is unknown head and neck cancer.    3. Recent left neck mass with abnormal PET scan demonstrating 3.5 cm. x 2.5 cm. mass with adjacent 9 mm. lymph node, hypermetabolic, with contemplated radiation/chemotherapy vs. resection, January 2011.       • Low back pain [M54.5]   • Shoulder joint pain [M25.519]     Overview Note:     Description: improved with exercises.     • Atherosclerosis of coronary artery [I25.10]     Overview Note:           • Hyperbilirubinemia [E80.6]   • Hypertension [I10]     Overview Note:           • COPD (chronic obstructive pulmonary disease) [J44.9]   • Hypokalemia [E87.6]   • Hypothyroid [E03.9]     Overview Note:           • Unstable angina pectoris [I20.0]         Past Surgical History:   Procedure Laterality Date   • APPENDECTOMY     • CARDIAC CATHETERIZATION      x1 stent   • CARDIAC CATHETERIZATION N/A 6/17/2016    Procedure: Left Heart Cath;  Surgeon: Son Lam MD;  Location:  [facility];  Service:    • CARDIAC SURGERY     • CATARACT EXTRACTION  2009   • CHOLECYSTECTOMY LAPAROSCOPIC INTRAOPERATIVE CHOLANGIOGRAM WITH LIVER BIOPSY N/A 7/8/2016    Procedure: CHOLECYSTECTOMY LAPAROSCOPIC WITH LIVER BIOPSY;  Surgeon: Son Andrade MD;  Location:  [facility];  Service:    • COLONOSCOPY      x2   • FOOT SURGERY  1962    right    • LASIK     • OH ERCP DX COLLECTION SPECIMEN BRUSHING/WASHING N/A 6/24/2016    Procedure: ENDOSCOPIC RETROGRADE CHOLANGIOPANCREATOGRAPHY;  Surgeon: Omari Cardenas MD;  Location:  [facility];  Service: Gastroenterology   • TONSILLECTOMY     • WISDOM TOOTH EXTRACTION         Allergies   Allergen Reactions   • Penicillins Rash and Other (See Comments)     \"flu-like\" symptoms   • Crestor [Rosuvastatin Calcium] Other (See Comments) and Myalgia     Arthralgias   • Lipitor [Atorvastatin] Other (See Comments) and Myalgia     Arthralgias   • Zocor [Simvastatin] Other (See Comments) and Myalgia     " Arthralgias         Current Outpatient Prescriptions:   •  allopurinol (ZYLOPRIM) 300 MG tablet, Take 300 mg by mouth daily., Disp: , Rfl:   •  amLODIPine (NORVASC) 10 MG tablet, Take 10 mg by mouth daily., Disp: , Rfl:   •  apixaban (ELIQUIS) 5 MG tablet tablet, Take 1 tablet by mouth 2 (Two) Times a Day., Disp: 180 tablet, Rfl: 3  •  aspirin 81 MG chewable tablet, Chew 81 mg daily., Disp: , Rfl:   •  Cholecalciferol (VITAMIN D3) 5000 UNITS capsule capsule, Take 5,000 Units by mouth daily., Disp: , Rfl:   •  citalopram (CeleXA) 20 MG tablet, Take 20 mg by mouth daily., Disp: , Rfl:   •  fentaNYL (DURAGESIC) 75 MCG/HR patch, Place 1 patch on the skin every third day., Disp: , Rfl:   •  furosemide (LASIX) 20 MG tablet, Take daily for the next 3 days then as distracted by your cardiologist, Disp: 30 tablet, Rfl: 0  •  gabapentin (NEURONTIN) 300 MG capsule, Take 300 mg by mouth 3 (three) times a day., Disp: , Rfl:   •  hydrochlorothiazide (HYDRODIURIL) 25 MG tablet, Take 25 mg by mouth daily., Disp: , Rfl:   •  HYDROcodone-acetaminophen (LORTAB)  MG per tablet, Take 1 tablet by mouth every 6 (six) hours as needed for moderate pain (4-6)., Disp: , Rfl:   •  levothyroxine (SYNTHROID, LEVOTHROID) 50 MCG tablet, Take 50 mcg by mouth daily., Disp: , Rfl:   •  LUBRICANT EYE DROPS 0.4-0.3 % solution ophthalmic solution, Administer 1 drop to both eyes at night as needed., Disp: , Rfl:   •  metoprolol succinate XL (TOPROL-XL) 50 MG 24 hr tablet, Take 1 tablet by mouth Daily., Disp: 90 tablet, Rfl: 3  •  naproxen sodium (ALEVE) 220 MG tablet, Take 440 mg by mouth at night as needed for mild pain (1-3)., Disp: , Rfl:   •  nitroglycerin (NITROSTAT) 0.4 MG SL tablet, Place 0.4 mg under the tongue as needed., Disp: , Rfl:   •  omeprazole (PriLOSEC) 20 MG capsule, Take 20 mg by mouth daily., Disp: , Rfl:   •  pravastatin (PRAVACHOL) 80 MG tablet, Take 1 tablet by mouth Daily., Disp: 90 tablet, Rfl: 2  •  tamsulosin (FLOMAX) 0.4  MG capsule 24 hr capsule, Take 1 capsule by mouth every night., Disp: , Rfl:   •  vitamin B-12 (CYANOCOBALAMIN) 1000 MCG tablet, Take 1,000 mcg by mouth daily., Disp: , Rfl:   •  vitamin C (ASCORBIC ACID) 500 MG tablet, Take 1,000 mg by mouth Daily., Disp: , Rfl:     The following portions of the patient's history were reviewed and updated as appropriate: allergies, current medications, past family history, past medical history, past social history, past surgical history and problem list.    Review of Systems   Constitution: Positive for malaise/fatigue and weight loss. Negative for chills, decreased appetite, diaphoresis, fever, weakness, night sweats and weight gain.   HENT: Negative.  Negative for congestion, headaches and nosebleeds.    Eyes: Negative.  Negative for blurred vision, visual disturbance and visual halos.   Cardiovascular: Positive for dyspnea on exertion (when fluid on lungs was presesnt ). Negative for chest pain, claudication, cyanosis, irregular heartbeat, leg swelling, near-syncope, orthopnea, palpitations, paroxysmal nocturnal dyspnea and syncope.   Respiratory: Negative.  Negative for cough, hemoptysis, shortness of breath, sleep disturbances due to breathing, snoring, sputum production and wheezing.    Endocrine: Negative.  Negative for cold intolerance, heat intolerance, polydipsia, polyphagia and polyuria.   Hematologic/Lymphatic: Bruises/bleeds easily.   Skin: Negative.  Negative for dry skin, itching and rash.   Musculoskeletal: Positive for back pain. Negative for falls, joint pain (knee, back, ), joint swelling, muscle weakness and myalgias.   Gastrointestinal: Positive for dysphagia. Negative for bloating, abdominal pain, constipation, diarrhea, heartburn, melena, nausea and vomiting.   Genitourinary: Negative.  Negative for dysuria, flank pain, hematuria and nocturia (at least 4).   Neurological: Positive for excessive daytime sleepiness. Negative for difficulty with concentration,  "dizziness and loss of balance.   Psychiatric/Behavioral: Positive for depression (\"Aggravated becasuse I can't do what I used to do\" ). Negative for altered mental status. The patient is not nervous/anxious.    Allergic/Immunologic: Negative for environmental allergies.       Objective:     Vitals:    04/14/17 1213 04/14/17 1222 04/14/17 1223   BP: 126/68 136/67 124/66   BP Location: Right arm Left arm Left arm   Patient Position: Sitting Sitting Standing   Pulse: (!) 44 (!) 45 (!) 46   Resp: 18     Temp: 97.5 °F (36.4 °C)     TempSrc: Temporal Artery      SpO2: 100%     Weight: 202 lb (91.6 kg)     Height: 69\" (175.3 cm)           Physical Exam   Constitutional: He is oriented to person, place, and time. He appears well-developed and well-nourished. No distress.   HENT:   Head: Normocephalic and atraumatic.   Mouth/Throat: Oropharynx is clear and moist.   Eyes: Conjunctivae are normal. Pupils are equal, round, and reactive to light. No scleral icterus.   Neck: No hepatojugular reflux and no JVD present. Carotid bruit is not present. No tracheal deviation present. No thyromegaly present.   Cardiovascular: Regular rhythm, normal heart sounds and intact distal pulses.  Bradycardia present.  Exam reveals no friction rub.    No murmur heard.  Pulmonary/Chest: Effort normal and breath sounds normal.   Abdominal: Soft. Bowel sounds are normal. He exhibits no distension. There is no tenderness.   Musculoskeletal: He exhibits no edema.   Lymphadenopathy:     He has no cervical adenopathy.   Neurological: He is alert and oriented to person, place, and time.   Skin: Skin is warm, dry and intact. No rash noted. No cyanosis or erythema. No pallor.   Psychiatric: He has a normal mood and affect. His behavior is normal. Thought content normal.   Vitals reviewed.      Lab and Diagnostic Review:    Admission on 04/07/2017, Discharged on 04/07/2017   Component Date Value Ref Range Status   • Glucose 04/07/2017 114* 70 - 100 mg/dL " Final   • BUN 04/07/2017 12  9 - 23 mg/dL Final   • Creatinine 04/07/2017 1.30  0.60 - 1.30 mg/dL Final   • Sodium 04/07/2017 133  132 - 146 mmol/L Final   • Potassium 04/07/2017 4.2  3.5 - 5.5 mmol/L Final   • Chloride 04/07/2017 97* 99 - 109 mmol/L Final   • CO2 04/07/2017 30.0  20.0 - 31.0 mmol/L Final   • Calcium 04/07/2017 9.8  8.7 - 10.4 mg/dL Final   • Total Protein 04/07/2017 7.6  5.7 - 8.2 g/dL Final   • Albumin 04/07/2017 4.50  3.20 - 4.80 g/dL Final   • ALT (SGPT) 04/07/2017 5* 7 - 40 U/L Final   • AST (SGOT) 04/07/2017 23  0 - 33 U/L Final   • Alkaline Phosphatase 04/07/2017 57  25 - 100 U/L Final   • Total Bilirubin 04/07/2017 0.6  0.3 - 1.2 mg/dL Final   • eGFR Non African Amer 04/07/2017 55* >60 mL/min/1.73 Final   • Globulin 04/07/2017 3.1  gm/dL Final   • A/G Ratio 04/07/2017 1.5  1.5 - 2.5 g/dL Final   • BUN/Creatinine Ratio 04/07/2017 9.2  7.0 - 25.0 Final   • Anion Gap 04/07/2017 6.0  3.0 - 11.0 mmol/L Final   • Magnesium 04/07/2017 2.2  1.3 - 2.7 mg/dL Final   • Extra Tube 04/07/2017 hold for add-on   Final    Auto resulted   • Extra Tube 04/07/2017 Hold for add-ons.   Final    Auto resulted.   • Extra Tube 04/07/2017 hold for add-on   Final    Auto resulted   • Extra Tube 04/07/2017 Hold for add-ons.   Final    Auto resulted.   • WBC 04/07/2017 8.29  3.50 - 10.80 10*3/mm3 Final   • RBC 04/07/2017 3.97* 4.20 - 5.76 10*6/mm3 Final   • Hemoglobin 04/07/2017 12.1* 13.1 - 17.5 g/dL Final   • Hematocrit 04/07/2017 37.5* 38.9 - 50.9 % Final   • MCV 04/07/2017 94.5  80.0 - 99.0 fL Final   • MCH 04/07/2017 30.5  27.0 - 31.0 pg Final   • MCHC 04/07/2017 32.3  32.0 - 36.0 g/dL Final   • RDW 04/07/2017 15.3* 11.3 - 14.5 % Final   • RDW-SD 04/07/2017 53.2  37.0 - 54.0 fl Final   • MPV 04/07/2017 9.7  6.0 - 12.0 fL Final   • Platelets 04/07/2017 188  150 - 450 10*3/mm3 Final   • Neutrophil % 04/07/2017 63.1  41.0 - 71.0 % Final   • Lymphocyte % 04/07/2017 22.3* 24.0 - 44.0 % Final   • Monocyte % 04/07/2017  11.0  0.0 - 12.0 % Final   • Eosinophil % 04/07/2017 2.8  0.0 - 3.0 % Final   • Basophil % 04/07/2017 0.6  0.0 - 1.0 % Final   • Immature Grans % 04/07/2017 0.2  0.0 - 0.6 % Final   • Neutrophils, Absolute 04/07/2017 5.23  1.50 - 8.30 10*3/mm3 Final   • Lymphocytes, Absolute 04/07/2017 1.85  0.60 - 4.80 10*3/mm3 Final   • Monocytes, Absolute 04/07/2017 0.91  0.00 - 1.00 10*3/mm3 Final   • Eosinophils, Absolute 04/07/2017 0.23  0.10 - 0.30 10*3/mm3 Final   • Basophils, Absolute 04/07/2017 0.05  0.00 - 0.20 10*3/mm3 Final   • Immature Grans, Absolute 04/07/2017 0.02  0.00 - 0.03 10*3/mm3 Final   • Troponin I 04/07/2017 0.00  0.00 - 0.07 ng/mL Final    Serial Number: [id number]    : [id number]   • BNP 04/07/2017 541.0* 0.0 - 100.0 pg/mL Final     4/7/17: EKG, sinus bradycardia with occasional PVC, 49 bpm    Chest x-ray 4/7/17: Minimal hilar vascular congestion and mild interstitial and granulomatous scarring reported    Echocardiogram 9/27/16: EF 50%    Left heart catheterization 6/17/16:   Abnormal LV function with global and segmental wall motion abnormalities; EF=48%.  Occlusion of all coronary arteries.  Patent LIMA and SVGs times 2 (LAD, Diagonal,PDA).  Myocardium in jeopardy in the distribution of the circumflex, right TURNER, and diagonal arteries.  Assessment and Plan:         1. Acute heart failure, unspecified heart failure type  Improved.  Hold Lasix at this time and monitor signs and symptoms, daily weights.    Heart failure education discussed: What his heart failure, causes, signs and symptoms, medication management, daily weight monitoring, low-sodium diet of less than 2 g per day, daily exercise, role the heart failure center.    Labs pending  - Basic Metabolic Panel  - BNP    If symptoms return, worsen we'll repeat echocardiogram    2. PAF (paroxysmal atrial fibrillation)      3. Ischemic heart disease      4. Essential hypertension  Keep blood pressure and heart rate log for 2 weeks    5.  Bradycardia    - Decrease metoprolol succinate XL (TOPROL-XL) 25 MG 24 hr tablet; Take 1 tablet by mouth Daily.  Dispense: 90 tablet; Refill: 3        Pt will follow-up in 4 weeks or as needed      *Please note that portions of this note were completed with a voice recognition program. Efforts were made to edit the dictations, but occasionally words are mistranscribed.

## 2017-04-14 NOTE — PATIENT INSTRUCTIONS
Decrease Metoprolol Succinate 25 mg daily    Keep blood pressure and heart rate log for 2 weeks.    Weight daily and call if you are gaining weight, swelling, or short of air.    Get labs today on the 3rd

## 2017-04-19 LAB
BNP SERPL-MCNC: 377 PG/ML (ref 0–100)
BUN SERPL-MCNC: 14 MG/DL (ref 9–23)
BUN/CREAT SERPL: 11.7 (ref 7–25)
CALCIUM SERPL-MCNC: 9.7 MG/DL (ref 8.7–10.4)
CHLORIDE SERPL-SCNC: 93 MMOL/L (ref 99–109)
CO2 SERPL-SCNC: 30 MMOL/L (ref 20–31)
CREAT SERPL-MCNC: 1.2 MG/DL (ref 0.6–1.3)
GLUCOSE SERPL-MCNC: 129 MG/DL (ref 70–100)
POTASSIUM SERPL-SCNC: 4.1 MMOL/L (ref 3.5–5.5)
SODIUM SERPL-SCNC: 127 MMOL/L (ref 132–146)

## 2017-04-25 ENCOUNTER — TRANSCRIBE ORDERS (OUTPATIENT)
Dept: ADMINISTRATIVE | Facility: HOSPITAL | Age: 72
End: 2017-04-25

## 2017-04-25 DIAGNOSIS — M48.061 LUMBAR SPINAL STENOSIS: Primary | ICD-10-CM

## 2017-04-27 ENCOUNTER — APPOINTMENT (OUTPATIENT)
Dept: MRI IMAGING | Facility: HOSPITAL | Age: 72
End: 2017-04-27

## 2017-05-05 ENCOUNTER — HOSPITAL ENCOUNTER (OUTPATIENT)
Dept: MRI IMAGING | Facility: HOSPITAL | Age: 72
Discharge: HOME | End: 2017-05-05
Admitting: ANESTHESIOLOGY

## 2017-05-05 DIAGNOSIS — M48.061 LUMBAR SPINAL STENOSIS: ICD-10-CM

## 2017-05-05 PROCEDURE — 72148 MRI LUMBAR SPINE W/O DYE: CPT

## 2017-06-20 ENCOUNTER — TELEPHONE (OUTPATIENT)
Dept: CARDIOLOGY | Facility: CLINIC | Age: 72
End: 2017-06-20

## 2017-06-20 NOTE — TELEPHONE ENCOUNTER
Dr. Toni Perez's oral surgery office called to ask for cardiac clearance for patient to have 2 dental extractions on July 11, 2017. Dr. Perez is asking clearance for patient to hold his Eliquis 2 days prior to procedure.     Can he be cleared and hold his eliquis?    Office fax is [phone]  Phone [phone]

## 2017-06-22 NOTE — TELEPHONE ENCOUNTER
Per Dr. Rodrigues, patient is clear to have procedure and hold his Eliquis for 2 days prior to procedure. Clearance letter faxed to office.

## 2017-08-09 ENCOUNTER — OFFICE VISIT (OUTPATIENT)
Dept: CARDIOLOGY | Facility: CLINIC | Age: 72
End: 2017-08-09

## 2017-08-09 VITALS
HEIGHT: 69 IN | DIASTOLIC BLOOD PRESSURE: 70 MMHG | SYSTOLIC BLOOD PRESSURE: 153 MMHG | HEART RATE: 55 BPM | WEIGHT: 210.8 LBS | BODY MASS INDEX: 31.22 KG/M2

## 2017-08-09 DIAGNOSIS — I10 ESSENTIAL HYPERTENSION: ICD-10-CM

## 2017-08-09 DIAGNOSIS — I25.810 ATHEROSCLEROSIS OF CORONARY ARTERY BYPASS GRAFT OF NATIVE HEART WITHOUT ANGINA PECTORIS: Primary | ICD-10-CM

## 2017-08-09 PROCEDURE — 99213 OFFICE O/P EST LOW 20 MIN: CPT | Performed by: INTERNAL MEDICINE

## 2017-08-09 RX ORDER — ASPIRIN 81 MG/1
81 TABLET ORAL DAILY
COMMUNITY
End: 2018-09-19

## 2017-09-26 ENCOUNTER — TRANSCRIBE ORDERS (OUTPATIENT)
Dept: ADMINISTRATIVE | Facility: HOSPITAL | Age: 72
End: 2017-09-26

## 2017-09-26 DIAGNOSIS — Z87.891 PERSONAL HISTORY OF TOBACCO USE, PRESENTING HAZARDS TO HEALTH: Primary | ICD-10-CM

## 2017-10-04 ENCOUNTER — HOSPITAL ENCOUNTER (OUTPATIENT)
Dept: ULTRASOUND IMAGING | Facility: HOSPITAL | Age: 72
Discharge: HOME | End: 2017-10-04
Attending: INTERNAL MEDICINE | Admitting: INTERNAL MEDICINE

## 2017-10-04 DIAGNOSIS — Z87.891 PERSONAL HISTORY OF TOBACCO USE, PRESENTING HAZARDS TO HEALTH: ICD-10-CM

## 2017-10-04 PROCEDURE — 76706 US ABDL AORTA SCREEN AAA: CPT

## 2017-10-11 ENCOUNTER — OFFICE VISIT (OUTPATIENT)
Dept: ORTHOPEDIC SURGERY | Facility: CLINIC | Age: 72
End: 2017-10-11

## 2017-10-11 VITALS
DIASTOLIC BLOOD PRESSURE: 73 MMHG | BODY MASS INDEX: 31.52 KG/M2 | WEIGHT: 208 LBS | SYSTOLIC BLOOD PRESSURE: 134 MMHG | HEART RATE: 58 BPM | HEIGHT: 68 IN

## 2017-10-11 DIAGNOSIS — I87.8 VENOUS STASIS: ICD-10-CM

## 2017-10-11 DIAGNOSIS — R09.89 DECREASED PEDAL PULSES: ICD-10-CM

## 2017-10-11 DIAGNOSIS — M21.961 FOOT DEFORMITY, ACQUIRED, RIGHT: Primary | ICD-10-CM

## 2017-10-11 PROCEDURE — 99204 OFFICE O/P NEW MOD 45 MIN: CPT | Performed by: ORTHOPAEDIC SURGERY

## 2017-10-11 RX ORDER — LEVOTHYROXINE SODIUM 0.07 MG/1
TABLET ORAL
COMMUNITY
Start: 2017-08-30 | End: 2018-09-19 | Stop reason: SDUPTHER

## 2017-10-11 RX ORDER — LEVOFLOXACIN 500 MG/1
TABLET, FILM COATED ORAL
COMMUNITY
Start: 2017-08-29 | End: 2018-06-18

## 2017-10-11 RX ORDER — VITAMINS A AND D
CAPSULE ORAL
COMMUNITY
Start: 2017-08-15 | End: 2018-09-19
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
## 2017-10-27 RX ORDER — PRAVASTATIN SODIUM 80 MG/1
80 TABLET ORAL DAILY
Qty: 90 TABLET | Refills: 2 | Status: SHIPPED | OUTPATIENT
Start: 2017-10-27 | End: 2017-11-22 | Stop reason: SDUPTHER

## 2017-11-22 RX ORDER — PRAVASTATIN SODIUM 80 MG/1
80 TABLET ORAL DAILY
Qty: 90 TABLET | Refills: 2 | Status: SHIPPED | OUTPATIENT
Start: 2017-11-22 | End: 2017-11-22 | Stop reason: SDUPTHER

## 2017-11-22 RX ORDER — PRAVASTATIN SODIUM 80 MG/1
80 TABLET ORAL DAILY
Qty: 30 TABLET | Refills: 0 | Status: SHIPPED | OUTPATIENT
Start: 2017-11-22 | End: 2018-09-19 | Stop reason: SDUPTHER

## 2018-06-18 ENCOUNTER — OFFICE VISIT (OUTPATIENT)
Dept: FAMILY MEDICINE CLINIC | Facility: CLINIC | Age: 73
End: 2018-06-18

## 2018-06-18 VITALS
DIASTOLIC BLOOD PRESSURE: 82 MMHG | HEIGHT: 68 IN | WEIGHT: 205.8 LBS | OXYGEN SATURATION: 98 % | BODY MASS INDEX: 31.19 KG/M2 | SYSTOLIC BLOOD PRESSURE: 134 MMHG | HEART RATE: 63 BPM

## 2018-06-18 DIAGNOSIS — E03.9 HYPOTHYROIDISM, UNSPECIFIED TYPE: ICD-10-CM

## 2018-06-18 DIAGNOSIS — M25.562 CHRONIC PAIN OF LEFT KNEE: ICD-10-CM

## 2018-06-18 DIAGNOSIS — G89.29 CHRONIC MIDLINE LOW BACK PAIN WITH BILATERAL SCIATICA: ICD-10-CM

## 2018-06-18 DIAGNOSIS — R51.9 INTRACTABLE EPISODIC HEADACHE, UNSPECIFIED HEADACHE TYPE: ICD-10-CM

## 2018-06-18 DIAGNOSIS — M54.42 CHRONIC MIDLINE LOW BACK PAIN WITH BILATERAL SCIATICA: ICD-10-CM

## 2018-06-18 DIAGNOSIS — I48.0 PAF (PAROXYSMAL ATRIAL FIBRILLATION) (HCC): ICD-10-CM

## 2018-06-18 DIAGNOSIS — E78.5 HYPERLIPIDEMIA, UNSPECIFIED HYPERLIPIDEMIA TYPE: ICD-10-CM

## 2018-06-18 DIAGNOSIS — I10 ESSENTIAL HYPERTENSION: ICD-10-CM

## 2018-06-18 DIAGNOSIS — I25.810 ATHEROSCLEROSIS OF CORONARY ARTERY BYPASS GRAFT OF NATIVE HEART WITHOUT ANGINA PECTORIS: ICD-10-CM

## 2018-06-18 DIAGNOSIS — M54.41 CHRONIC MIDLINE LOW BACK PAIN WITH BILATERAL SCIATICA: ICD-10-CM

## 2018-06-18 DIAGNOSIS — Z76.89 ENCOUNTER TO ESTABLISH CARE: Primary | ICD-10-CM

## 2018-06-18 DIAGNOSIS — G89.29 CHRONIC PAIN OF LEFT KNEE: ICD-10-CM

## 2018-06-18 PROCEDURE — 99214 OFFICE O/P EST MOD 30 MIN: CPT | Performed by: INTERNAL MEDICINE

## 2018-06-18 RX ORDER — FENTANYL 50 UG/H
1 PATCH TRANSDERMAL
COMMUNITY
End: 2018-09-19

## 2018-06-18 RX ORDER — PREDNISONE 20 MG/1
TABLET ORAL
Qty: 6 TABLET | Refills: 0 | Status: SHIPPED | OUTPATIENT
Start: 2018-06-18 | End: 2018-09-19

## 2018-06-18 NOTE — PATIENT INSTRUCTIONS
General Headache Without Cause  A headache is pain or discomfort felt around the head or neck area. There are many causes and types of headaches. In some cases, the cause may not be found.  Follow these instructions at home:  Managing pain  · Take over-the-counter and prescription medicines only as told by your doctor.  · Lie down in a dark, quiet room when you have a headache.  · If directed, apply ice to the head and neck area:  ? Put ice in a plastic bag.  ? Place a towel between your skin and the bag.  ? Leave the ice on for 20 minutes, 2-3 times per day.  · Use a heating pad or hot shower to apply heat to the head and neck area as told by your doctor.  · Keep lights dim if bright lights bother you or make your headaches worse.  Eating and drinking  · Eat meals on a regular schedule.  · Lessen how much alcohol you drink.  · Lessen how much caffeine you drink, or stop drinking caffeine.  General instructions  · Keep all follow-up visits as told by your doctor. This is important.  · Keep a journal to find out if certain things bring on headaches. For example, write down:  ? What you eat and drink.  ? How much sleep you get.  ? Any change to your diet or medicines.  · Relax by getting a massage or doing other relaxing activities.  · Lessen stress.  · Sit up straight. Do not tighten (tense) your muscles.  · Do not use tobacco products. This includes cigarettes, chewing tobacco, or e-cigarettes. If you need help quitting, ask your doctor.  · Exercise regularly as told by your doctor.  · Get enough sleep. This often means 7-9 hours of sleep.  Contact a doctor if:  · Your symptoms are not helped by medicine.  · You have a headache that feels different than the other headaches.  · You feel sick to your stomach (nauseous) or you throw up (vomit).  · You have a fever.  Get help right away if:  · Your headache becomes really bad.  · You keep throwing up.  · You have a stiff neck.  · You have trouble seeing.  · You have  trouble speaking.  · You have pain in the eye or ear.  · Your muscles are weak or you lose muscle control.  · You lose your balance or have trouble walking.  · You feel like you will pass out (faint) or you pass out.  · You have confusion.  This information is not intended to replace advice given to you by your health care provider. Make sure you discuss any questions you have with your health care provider.  Document Released: 09/26/2009 Document Revised: 05/25/2017 Document Reviewed: 04/11/2016  [employer] Patient Education © 2018 [employer]

## 2018-06-18 NOTE — PROGRESS NOTES
Chief Complaint   Patient presents with   • Establish Care       HPI     Fidencio Zarate Jr. is a pleasant 72 y.o. male who is here to establish care.  Patient was previously seen by Dr. King and myself at [facility].  His past medical history is significant for chronic low back and left knee pain, paroxysmal atrial fibrillation, hypertension, depression, hypothyroidism, hyperlipidemia, and atherosclerosis of coronary artery bypass graft of native heart without angina pectoris.  Patient previously smoked but quit in 2008.  His blood pressure is stable and well-controlled today.  He is compliant on all medications including L Oquist for his proximal afibrillation, levothyroxine for his hypothyroidism, pravastatin for his hyperlipidemia, citalopram for his depression and multiple medications for his blood pressure.  Patient is currently being followed by pain management at [facility] and they are weaning him off of his fentanyl and hydrocodone.  He was not getting any relief with these medications and they felt discontinuing them would be best.  He is now down to 2 hydrocodone a day and is on fentanyl 50 MCG per hour patch.  The pain management feel that he will benefit from injections and he is hopeful that he will get some relief.  He was previously seen by Dr. Pollock and did have injections which were not beneficial.    He does report new onset left occipital headache with radiation into the left temporal and orbit.  He admits that he has had this in the past and it was helped with injections.  He is unsure of the doctor that perform this but will make an appointment for follow-up.    Past Medical History:   Diagnosis Date   • Arthritis    • Cancer     thoat cancer   • Coronary artery disease    • Gout    • History of chemotherapy    • Hyperlipidemia    • Hypertension    • Injury of back    • Low back pain    • Myocardial infarction     x2   • PAF (paroxysmal atrial fibrillation)    • Rotator cuff rupture    • Uses  "hearing aid     bilat        Past Surgical History:   Procedure Laterality Date   • APPENDECTOMY     • CARDIAC CATHETERIZATION      x1 stent   • CARDIAC CATHETERIZATION N/A 6/17/2016    Procedure: Left Heart Cath;  Surgeon: Son Lam MD;  Location: [facility] CATH INVASIVE LOCATION;  Service:    • CARDIAC SURGERY     • CATARACT EXTRACTION  2009   • CHOLECYSTECTOMY LAPAROSCOPIC INTRAOPERATIVE CHOLANGIOGRAM WITH LIVER BIOPSY N/A 7/8/2016    Procedure: CHOLECYSTECTOMY LAPAROSCOPIC WITH LIVER BIOPSY;  Surgeon: Son Andrade MD;  Location:  [facility];  Service:    • COLONOSCOPY      x2   • CORONARY ARTERY BYPASS GRAFT     • FOOT SURGERY  1962    right    • FRACTURE SURGERY     • LASIK     • CO ERCP DX COLLECTION SPECIMEN BRUSHING/WASHING N/A 6/24/2016    Procedure: ENDOSCOPIC RETROGRADE CHOLANGIOPANCREATOGRAPHY;  Surgeon: Omari Cardenas MD;  Location: [facility] ENDOSCOPY;  Service: Gastroenterology   • TONSILLECTOMY     • WISDOM TOOTH EXTRACTION         Family History   Problem Relation Age of Onset   • COPD Mother    • Depression Mother    • Hypertension Mother        Social History     Social History   • Marital status:      Spouse name: N/A   • Number of children: N/A   • Years of education: N/A     Occupational History   • Not on file.     Social History Main Topics   • Smoking status: Former Smoker     Packs/day: 1.50     Years: 50.00     Types: Cigarettes     Quit date: 6/15/2010   • Smokeless tobacco: Never Used   • Alcohol use Yes      Comment: beer occasional   • Drug use: No   • Sexual activity: Defer     Other Topics Concern   • Not on file     Social History Narrative    Patient consumes 4 diet pepsi sodas daily.     Patient lives at home with wife.            Allergies   Allergen Reactions   • Penicillins Rash and Other (See Comments)     \"flu-like\" symptoms   • Crestor [Rosuvastatin Calcium] Other (See Comments) and Myalgia     Arthralgias   • Lipitor [Atorvastatin] Other (See " "Comments) and Myalgia     Arthralgias   • Zocor [Simvastatin] Other (See Comments) and Myalgia     Arthralgias       ROS    Review of Systems   Constitutional: Positive for fatigue. Negative for activity change, appetite change, chills, diaphoresis, fever, unexpected weight gain and unexpected weight loss.   HENT: Negative for congestion, dental problem, ear pain, hearing loss, nosebleeds, sinus pressure, sore throat and trouble swallowing.    Eyes: Negative for blurred vision, pain, redness and visual disturbance.   Respiratory: Negative for apnea, cough, chest tightness, shortness of breath and wheezing.    Cardiovascular: Negative for chest pain, palpitations and leg swelling.   Gastrointestinal: Negative for abdominal distention, abdominal pain, anal bleeding, blood in stool, constipation, diarrhea, nausea, vomiting, GERD and indigestion.   Endocrine: Negative for cold intolerance, heat intolerance, polydipsia, polyphagia and polyuria.   Genitourinary: Negative for urinary incontinence, decreased urine volume, difficulty urinating, dysuria, frequency, hematuria and urgency.   Musculoskeletal: Positive for arthralgias, back pain and myalgias. Negative for gait problem, joint swelling and bursitis.   Skin: Negative for dry skin, rash, skin lesions and bruise.   Neurological: Negative for dizziness, tremors, seizures, syncope, speech difficulty, weakness, light-headedness, headache, memory problem and confusion.   Hematological: Does not bruise/bleed easily.   Psychiatric/Behavioral: Positive for sleep disturbance. Negative for agitation, behavioral problems, decreased concentration, hallucinations, suicidal ideas, depressed mood and stress. The patient is not nervous/anxious.        Vitals:    06/18/18 1316   BP: 134/82   BP Location: Right arm   Patient Position: Sitting   Cuff Size: Adult   Pulse: 63   SpO2: 98%   Weight: 93.4 kg (205 lb 12.8 oz)   Height: 172 cm (67.72\")         Current Outpatient " Prescriptions:   •  allopurinol (ZYLOPRIM) 300 MG tablet, Take 300 mg by mouth daily., Disp: , Rfl:   •  amLODIPine (NORVASC) 10 MG tablet, Take 10 mg by mouth daily., Disp: , Rfl:   •  apixaban (ELIQUIS) 5 MG tablet tablet, Take 1 tablet by mouth 2 (Two) Times a Day., Disp: 180 tablet, Rfl: 3  •  aspirin 81 MG EC tablet, Take 81 mg by mouth Daily., Disp: , Rfl:   •  Aspirin Effervescent (JOÃO-SELTZER PO), Take  by mouth As Needed., Disp: , Rfl:   •  Cholecalciferol (VITAMIN D3) 5000 UNITS capsule capsule, Take 5,000 Units by mouth daily., Disp: , Rfl:   •  citalopram (CeleXA) 20 MG tablet, Take 20 mg by mouth daily., Disp: , Rfl:   •  fentaNYL (DURAGESIC) 75 MCG/HR patch, Place 1 patch on the skin every third day., Disp: , Rfl:   •  gabapentin (NEURONTIN) 300 MG capsule, Take 300 mg by mouth 3 (three) times a day., Disp: , Rfl:   •  hydrochlorothiazide (HYDRODIURIL) 25 MG tablet, Take 25 mg by mouth daily., Disp: , Rfl:   •  HYDROcodone-acetaminophen (LORTAB)  MG per tablet, Take 1 tablet by mouth every 6 (six) hours as needed for moderate pain (4-6)., Disp: , Rfl:   •  levothyroxine (SYNTHROID, LEVOTHROID) 75 MCG tablet, , Disp: , Rfl:   •  LUBRICANT EYE DROPS 0.4-0.3 % solution ophthalmic solution, Administer 1 drop to both eyes at night as needed., Disp: , Rfl:   •  metoprolol succinate XL (TOPROL-XL) 25 MG 24 hr tablet, Take 1 tablet by mouth Daily., Disp: 90 tablet, Rfl: 3  •  naproxen sodium (ALEVE) 220 MG tablet, Take 220 mg by mouth Daily As Needed for Mild Pain (1-3)., Disp: , Rfl:   •  nitroglycerin (NITROSTAT) 0.4 MG SL tablet, Place 0.4 mg under the tongue as needed., Disp: , Rfl:   •  omeprazole (PriLOSEC) 20 MG capsule, Take 20 mg by mouth daily., Disp: , Rfl:   •  pravastatin (PRAVACHOL) 80 MG tablet, Take 1 tablet by mouth Daily., Disp: 30 tablet, Rfl: 0  •  tamsulosin (FLOMAX) 0.4 MG capsule 24 hr capsule, Take 1 capsule by mouth every night., Disp: , Rfl:   •  vitamin B-12 (CYANOCOBALAMIN)  1000 MCG tablet, Take 1,000 mcg by mouth daily., Disp: , Rfl:   •  vitamin C (ASCORBIC ACID) 500 MG tablet, Take 1,000 mg by mouth Daily., Disp: , Rfl:   •  Vitamins A & D 5000-400 units capsule, , Disp: , Rfl:   •  predniSONE (DELTASONE) 20 MG tablet, Take 3 tablets (60 mg) in the morning for 2 days, Disp: 6 tablet, Rfl: 0    PE    Physical Exam   Constitutional: He is oriented to person, place, and time. He appears well-developed and well-nourished. No distress.   HENT:   Head: Normocephalic.   Eyes: Conjunctivae and EOM are normal.   Neck: Normal range of motion.   Cardiovascular: Normal rate and regular rhythm.  Exam reveals no gallop and no friction rub.    Murmur heard.  Pulmonary/Chest: Effort normal and breath sounds normal. No respiratory distress. He has no wheezes. He has no rales.   Musculoskeletal: He exhibits no edema.   TTP along left trapezius along trigger point which reproduces headache.   Neurological: He is alert and oriented to person, place, and time.   Skin: Skin is warm. No rash noted. He is not diaphoretic. No erythema.   Psychiatric: He has a normal mood and affect. His behavior is normal. Judgment and thought content normal.   Vitals reviewed.      SHAILESH/KARMA Nicolas was seen today for establish care.    Diagnoses and all orders for this visit:    Encounter to establish care    Intractable episodic headache, unspecified headache type  -new onset left occipital headache that radiates into his temporal and orbital region for the last few days  -history of similar symptoms helped with injections, he is planning on making an appointment with doctor who previously performed these  -headache is reproducible with pressure along trigger point in left trapezius  -feel this is related to musculoskeletal and will refer to PT and give 2 day dose of prednisone to try and break headache  -follow-up with original physician who gave injections if needed  -     predniSONE (DELTASONE) 20 MG tablet; Take 3 tablets  (60 mg) in the morning for 2 days  -     Ambulatory Referral to Physical Therapy    Essential hypertension  -stable and well-controlled  -compliant on medications    Atherosclerosis of coronary artery bypass graft of native heart without angina pectoris  -followed by cardiology    Hyperlipidemia, unspecified hyperlipidemia type  -on pravastatin 80 mg QD  -wasn't able to afford repatha    Hypothyroidism, unspecified type  -on levothyroxine    Chronic midline low back pain with bilateral sciatica  -followed by pain management and is planning on having injections  -pain management is managing his narcotic medications and is slowly tapering him off of them as he doesn't receive much benefit from them currently     PAF (paroxysmal atrial fibrillation)  -on eliquis  -today RRR    Chronic pain of left knee  -followed by pain management and is planning on having injections       Plan of care reviewed with patient at the conclusion of today's visit. Education was provided regarding diagnosis, management and any prescribed or recommended OTC medications.  Patient verbalizes understanding of and agreement with management plan.    Return in about 3 months (around 9/18/2018) for Annual physical, Medicare Wellness.     Deborah Henson PA-C

## 2018-08-08 DIAGNOSIS — R51.9 RECURRENT OCCIPITAL HEADACHE: Primary | ICD-10-CM

## 2018-08-21 ENCOUNTER — TELEPHONE (OUTPATIENT)
Dept: FAMILY MEDICINE CLINIC | Facility: CLINIC | Age: 73
End: 2018-08-21

## 2018-08-21 NOTE — TELEPHONE ENCOUNTER
TERESA BERGER FROM [facility] CALLED JUST TO LET YOU KNOW IT'S TIME FOR MR SHAFFER TO HAVE THE LUNG CT SCAN DONE AGAIN. IT WAS DUE IN MAY AN SHE HASN'T HEARD ANYTHING IF HE WAS COMING BACK TO HAVE THIS DONE PLEASE CALL IF HE NEEDS TO HAVE IT DONE AGAIN. PT WAS AN OLD PT OF DR FRANCO AN WAS HAVING THIS DONE YEARLY.

## 2018-08-22 DIAGNOSIS — Z12.2 ENCOUNTER FOR SCREENING FOR LUNG CANCER: ICD-10-CM

## 2018-08-22 DIAGNOSIS — Z87.891 HISTORY OF NICOTINE DEPENDENCE: ICD-10-CM

## 2018-08-22 NOTE — TELEPHONE ENCOUNTER
Placed order.  They will most likely refer him to have it done here at [facility] unless he requests [facility] specifically.  Please obtain previous radiology report on CT at [facility].

## 2018-08-22 NOTE — TELEPHONE ENCOUNTER
Called and spoke with pt. He stated he had received a call yesterday but was informed that he would need a new order from our office to have this done. I informed pt I would let Deborah know. He had no further questions.

## 2018-08-22 NOTE — PROGRESS NOTES
Low-Dose Lung Cancer CT Screening Visit    CHIEF COMPLAINT:    Shared Decision Making  I am discussing tobacco cessation today with Fidencio Zarate Jr.    SMOKING HISTORY:     History   Smoking Status   • Former Smoker   • Packs/day: 1.50   • Years: 50.00   • Types: Cigarettes   • Quit date: 6/15/2010   Smokeless Tobacco   • Never Used       SUBJECTIVE:     Fidencio Zarate Jr. is a former smoker quitting {NUMBERS; [id number]} years ago with a  ***  pack year history.  he reports no use of alternate forms of tobacco, electronic cigarettes, marijuana or other substances.  Based on the recommendation of the [place] Preventive Services Task Force, this patient is at high risk for lung cancer and a low-dose CT screening scan is recommended.     The patient has had no hemoptysis, unintentional weight loss or increasing shortness of breath. The patient is asymptomatic and has no signs or symptoms of lung cancer.     Together we discussed the potential benefits and potential harms of being screened for lung cancer including the potential for follow up diagnostic testing, risk for over diagnosis, false positive rate and radiation exposure using the AHRQ: Is Lung Cancer Screening Right for Me Decision Aid (Publication 25-[id number]-[id number], web site www.ahrq.gov). A copy of this decision aid resource has been provided in the after visit summary.  We also reviewed the patient's smoking history and counseled him on the importance and health benefits of maintaining cigarette smoking abstinence.      OBJECTIVE:    There were no vitals taken for this visit.  General: no distress, alert and oriented  Chest: Lung sounds are clear to auscultation, no wheezes, rales or rhonchi, with symmetric air entry. No respiratory distress  Cardiovascular: RRR with no murmur auscultated      Continued Smoking Abstinence discussion:     We discussed that there are a number of resources and interventions to assist with smoking cessation if  needed in the future including the 1-800-Quit Now line.(Included in the decision aid shared with the patient today).   On a scale of zero to ten, the patient rates their motivation to stay quit at a *** out of 10 today.  The patient is confident that they will never smoke in the future.    Recommendations for continued lung cancer screening:      We discussed the NCCN guidelines for lung cancer screening and the patient verbalized understanding that annual screening is recommended until fifteen years beyond smoking as long as they have no other disease or comorbidity that would prevent them from receiving cancer treatments such as surgery should a lung cancer be detected.  After review of the NCCN guidelines and recommendations for ongoing screening, the patient verbalized understanding of recommendations for follow-up.  The patient {HAS:15094} decided to proceed with a Low Dose Lung Cancer Screening CT today.       {042:[id number]}minutes face-to-face spent counseling patient on the continued health benefits of having quit tobacco, maintaining smoking abstinence, smoking cessation strategies and resources, as well as the importance of adherence to annual lung cancer low-dose CT screening.

## 2018-08-22 NOTE — TELEPHONE ENCOUNTER
Please contact patient and remind him that he needs his follow-up CT lung.  Would recommend that he get it done within the next week or so since it was supposed to be done in May.

## 2018-09-19 ENCOUNTER — RESULTS ENCOUNTER (OUTPATIENT)
Dept: FAMILY MEDICINE CLINIC | Facility: CLINIC | Age: 73
End: 2018-09-19

## 2018-09-19 ENCOUNTER — OFFICE VISIT (OUTPATIENT)
Dept: FAMILY MEDICINE CLINIC | Facility: CLINIC | Age: 73
End: 2018-09-19

## 2018-09-19 DIAGNOSIS — G89.29 CHRONIC MIDLINE LOW BACK PAIN WITHOUT SCIATICA: ICD-10-CM

## 2018-09-19 DIAGNOSIS — J44.9 CHRONIC OBSTRUCTIVE PULMONARY DISEASE, UNSPECIFIED COPD TYPE (HCC): ICD-10-CM

## 2018-09-19 DIAGNOSIS — I50.9 ACUTE HEART FAILURE, UNSPECIFIED HEART FAILURE TYPE (HCC): ICD-10-CM

## 2018-09-19 DIAGNOSIS — N40.1 BPH WITH OBSTRUCTION/LOWER URINARY TRACT SYMPTOMS: ICD-10-CM

## 2018-09-19 DIAGNOSIS — G89.4 CHRONIC PAIN SYNDROME: ICD-10-CM

## 2018-09-19 DIAGNOSIS — Z00.00 PHYSICAL EXAM, ANNUAL: ICD-10-CM

## 2018-09-19 DIAGNOSIS — I10 ESSENTIAL HYPERTENSION: ICD-10-CM

## 2018-09-19 DIAGNOSIS — F32.A DEPRESSION, UNSPECIFIED DEPRESSION TYPE: ICD-10-CM

## 2018-09-19 DIAGNOSIS — K21.9 GASTROESOPHAGEAL REFLUX DISEASE WITHOUT ESOPHAGITIS: ICD-10-CM

## 2018-09-19 DIAGNOSIS — E78.5 HYPERLIPIDEMIA, UNSPECIFIED HYPERLIPIDEMIA TYPE: ICD-10-CM

## 2018-09-19 DIAGNOSIS — N13.8 BPH WITH OBSTRUCTION/LOWER URINARY TRACT SYMPTOMS: ICD-10-CM

## 2018-09-19 DIAGNOSIS — M25.562 CHRONIC PAIN OF LEFT KNEE: ICD-10-CM

## 2018-09-19 DIAGNOSIS — G44.89 HEADACHE SYNDROME: ICD-10-CM

## 2018-09-19 DIAGNOSIS — I48.0 PAROXYSMAL ATRIAL FIBRILLATION (HCC): ICD-10-CM

## 2018-09-19 DIAGNOSIS — M1A.09X0 CHRONIC GOUT OF MULTIPLE SITES, UNSPECIFIED CAUSE: ICD-10-CM

## 2018-09-19 DIAGNOSIS — C32.9 MALIGNANT NEOPLASM OF LARYNX (HCC): ICD-10-CM

## 2018-09-19 DIAGNOSIS — I48.0 PAF (PAROXYSMAL ATRIAL FIBRILLATION) (HCC): ICD-10-CM

## 2018-09-19 DIAGNOSIS — Z00.00 MEDICARE ANNUAL WELLNESS VISIT, SUBSEQUENT: Primary | ICD-10-CM

## 2018-09-19 DIAGNOSIS — Z12.5 PROSTATE CANCER SCREENING: ICD-10-CM

## 2018-09-19 DIAGNOSIS — Z11.59 ENCOUNTER FOR HEPATITIS C SCREENING TEST FOR LOW RISK PATIENT: ICD-10-CM

## 2018-09-19 DIAGNOSIS — G89.29 CHRONIC PAIN OF LEFT KNEE: ICD-10-CM

## 2018-09-19 DIAGNOSIS — R73.01 IMPAIRED FASTING GLUCOSE: ICD-10-CM

## 2018-09-19 DIAGNOSIS — E03.9 HYPOTHYROIDISM, UNSPECIFIED TYPE: ICD-10-CM

## 2018-09-19 DIAGNOSIS — M54.50 CHRONIC MIDLINE LOW BACK PAIN WITHOUT SCIATICA: ICD-10-CM

## 2018-09-19 DIAGNOSIS — E55.9 VITAMIN D DEFICIENCY: ICD-10-CM

## 2018-09-19 DIAGNOSIS — I25.810 ATHEROSCLEROSIS OF CORONARY ARTERY BYPASS GRAFT OF NATIVE HEART WITHOUT ANGINA PECTORIS: ICD-10-CM

## 2018-09-19 DIAGNOSIS — Z23 NEED FOR IMMUNIZATION AGAINST INFLUENZA: ICD-10-CM

## 2018-09-19 PROBLEM — M10.9 GOUT: Status: ACTIVE | Noted: 2018-05-16

## 2018-09-19 PROBLEM — M54.9 CHRONIC BACK PAIN: Status: ACTIVE | Noted: 2018-09-19

## 2018-09-19 PROCEDURE — G0008 ADMIN INFLUENZA VIRUS VAC: HCPCS | Performed by: PHYSICIAN ASSISTANT

## 2018-09-19 PROCEDURE — 99214 OFFICE O/P EST MOD 30 MIN: CPT | Performed by: PHYSICIAN ASSISTANT

## 2018-09-19 PROCEDURE — 96160 PT-FOCUSED HLTH RISK ASSMT: CPT | Performed by: PHYSICIAN ASSISTANT

## 2018-09-19 PROCEDURE — 90662 IIV NO PRSV INCREASED AG IM: CPT | Performed by: PHYSICIAN ASSISTANT

## 2018-09-19 PROCEDURE — G0439 PPPS, SUBSEQ VISIT: HCPCS | Performed by: PHYSICIAN ASSISTANT

## 2018-09-19 RX ORDER — METOPROLOL SUCCINATE 25 MG/1
25 TABLET, EXTENDED RELEASE ORAL DAILY
Qty: 90 TABLET | Refills: 3 | Status: SHIPPED | OUTPATIENT
Start: 2018-09-19 | End: 2019-03-08

## 2018-09-19 RX ORDER — HYDROCODONE BITARTRATE AND ACETAMINOPHEN 5; 325 MG/1; MG/1
1 TABLET ORAL EVERY 8 HOURS PRN
Qty: 30 TABLET | Refills: 0 | Status: SHIPPED | OUTPATIENT
Start: 2018-09-19 | End: 2018-10-11

## 2018-09-19 RX ORDER — OMEPRAZOLE 20 MG/1
20 CAPSULE, DELAYED RELEASE ORAL DAILY
Qty: 90 CAPSULE | Refills: 3 | Status: SHIPPED | OUTPATIENT
Start: 2018-09-19 | End: 2019-01-01 | Stop reason: SDUPTHER

## 2018-09-19 RX ORDER — CITALOPRAM 20 MG/1
20 TABLET ORAL DAILY
Qty: 90 TABLET | Refills: 3 | Status: SHIPPED | OUTPATIENT
Start: 2018-09-19 | End: 2019-01-01 | Stop reason: SDUPTHER

## 2018-09-19 RX ORDER — AMLODIPINE BESYLATE 5 MG/1
5 TABLET ORAL DAILY
Qty: 90 TABLET | Refills: 2 | Status: SHIPPED | OUTPATIENT
Start: 2018-09-19 | End: 2018-09-19 | Stop reason: SDUPTHER

## 2018-09-19 RX ORDER — ALLOPURINOL 300 MG/1
300 TABLET ORAL DAILY
Qty: 90 TABLET | Refills: 3 | Status: SHIPPED | OUTPATIENT
Start: 2018-09-19 | End: 2019-01-01 | Stop reason: SDUPTHER

## 2018-09-19 RX ORDER — TAMSULOSIN HYDROCHLORIDE 0.4 MG/1
1 CAPSULE ORAL NIGHTLY
Qty: 90 CAPSULE | Refills: 3 | Status: SHIPPED | OUTPATIENT
Start: 2018-09-19 | End: 2020-01-01 | Stop reason: SDUPTHER

## 2018-09-19 RX ORDER — PRAVASTATIN SODIUM 80 MG/1
80 TABLET ORAL DAILY
Qty: 90 TABLET | Refills: 3 | Status: SHIPPED | OUTPATIENT
Start: 2018-09-19 | End: 2019-03-19 | Stop reason: ALTCHOICE

## 2018-09-19 RX ORDER — LEVOTHYROXINE SODIUM 0.07 MG/1
75 TABLET ORAL DAILY
Qty: 90 TABLET | Refills: 3 | Status: SHIPPED | OUTPATIENT
Start: 2018-09-19 | End: 2019-01-01

## 2018-09-19 RX ORDER — HYDROCHLOROTHIAZIDE 25 MG/1
25 TABLET ORAL DAILY
Qty: 90 TABLET | Refills: 3 | Status: SHIPPED | OUTPATIENT
Start: 2018-09-19 | End: 2018-10-11

## 2018-09-19 RX ORDER — AMLODIPINE BESYLATE 5 MG/1
5 TABLET ORAL DAILY
Qty: 90 TABLET | Refills: 2 | Status: SHIPPED | OUTPATIENT
Start: 2018-09-19 | End: 2018-10-11

## 2018-09-19 RX ORDER — GABAPENTIN 300 MG/1
300 CAPSULE ORAL 3 TIMES DAILY
Qty: 90 CAPSULE | Refills: 1 | Status: SHIPPED | OUTPATIENT
Start: 2018-09-19 | End: 2019-03-09

## 2018-09-19 NOTE — PROGRESS NOTES
QUICK REFERENCE INFORMATION:  The ABCs of the Annual Wellness Visit    Medicare Subsequent Wellness Visit    Chief Complaint   Patient presents with   • Annual Exam     Subsequent Medicare Wellness        HPI     Fidencio Zarate Jr. is a 72 y.o. male presenting for Annual Exam (Subsequent Medicare Wellness)  .     Patient is a pleasant 73 y/o white male who presents to the office for subsequent annual medicare wellness.  Patient's past medical history is significant for chronic low back and left knee pain with narcotic dependence, paroxysmal atrial fibrillation, CAD, hyperlipidemia, hypertension, gout, history of throat cancer, history of smoking with quit date in 2008, GERD, COPD, and headaches.    Patient is compliant on all medications.  His blood pressure is low today and he reports dizziness with sitting to standing.  He is followed by Dr. Rodrigues.  Taking metoprolol 25 mg QD, amlodipine 5 mg QD and HCTZ 25 mg QD.  He is on eliquis 5 mg BID for PAF but admits he has not been on this for the last 10 days and has supplemented with ASA 81 mg after he ran out of prescription.  He denies any SOB, cough or wheezing with COPD and is not currently using any inhalers.    He was recently being seen by Dr. Sagastume with pain management; however, they abruptly went out of business.  He has run out of his gabapentin and his lortab.  He reports worsening back and leg pain especially at night when he is trying to sleep.  He was hoping to have steroid injections with pain management and even got insurance approval but then they closed.  He has discontinued fentanyl patch.  He is trying to taper off of lortab and is only taking 5 mg TID compared to 10 mg QID.  He is hoping the gabapentin 300 mg TID will eventually be all he needs to take.    Past Medical History:   Diagnosis Date   • Arthritis    • Cancer (CMS/[facility])     thoat cancer   • Coronary artery disease    • Gout    • History of chemotherapy    • Hyperlipidemia    •  Hypertension    • Injury of back    • Low back pain    • Myocardial infarction     x2   • PAF (paroxysmal atrial fibrillation) (CMS/HCC)    • Rotator cuff rupture    • Uses hearing aid     bilat       Past Surgical History:   Procedure Laterality Date   • APPENDECTOMY     • CARDIAC CATHETERIZATION      x1 stent   • CARDIAC CATHETERIZATION N/A 6/17/2016    Procedure: Left Heart Cath;  Surgeon: Son Lam MD;  Location:  [facility];  Service:    • CARDIAC SURGERY     • CATARACT EXTRACTION  2009   • CHOLECYSTECTOMY LAPAROSCOPIC INTRAOPERATIVE CHOLANGIOGRAM WITH LIVER BIOPSY N/A 7/8/2016    Procedure: CHOLECYSTECTOMY LAPAROSCOPIC WITH LIVER BIOPSY;  Surgeon: Son Andrade MD;  Location:  [facility];  Service:    • COLONOSCOPY      x2   • CORONARY ARTERY BYPASS GRAFT     • FOOT SURGERY  1962    right    • FRACTURE SURGERY     • LASIK     • NC ERCP DX COLLECTION SPECIMEN BRUSHING/WASHING N/A 6/24/2016    Procedure: ENDOSCOPIC RETROGRADE CHOLANGIOPANCREATOGRAPHY;  Surgeon: Omari Cardenas MD;  Location:  [facility];  Service: Gastroenterology   • TONSILLECTOMY     • WISDOM TOOTH EXTRACTION       Family History   Problem Relation Age of Onset   • COPD Mother    • Depression Mother    • Hypertension Mother       Social History     Social History   • Marital status:      Spouse name: N/A   • Number of children: N/A   • Years of education: N/A     Occupational History   • Not on file.     Social History Main Topics   • Smoking status: Former Smoker     Packs/day: 1.50     Years: 50.00     Types: Cigarettes     Quit date: 6/15/2010   • Smokeless tobacco: Never Used   • Alcohol use Yes      Comment: beer occasional   • Drug use: No   • Sexual activity: Defer     Other Topics Concern   • Not on file     Social History Narrative    Patient consumes 4 diet pepsi sodas daily.     Patient lives at home with wife.           Allergies   Allergen Reactions   • Penicillins Rash and  "Other (See Comments)     \"flu-like\" symptoms   • Crestor [Rosuvastatin Calcium] Other (See Comments) and Myalgia     Arthralgias   • Lipitor [Atorvastatin] Other (See Comments) and Myalgia     Arthralgias   • Zocor [Simvastatin] Other (See Comments) and Myalgia     Arthralgias      Outpatient Medications Prior to Visit   Medication Sig Dispense Refill   • allopurinol (ZYLOPRIM) 300 MG tablet Take 300 mg by mouth daily.     • Cholecalciferol (VITAMIN D3) 5000 UNITS capsule capsule Take 5,000 Units by mouth daily.     • citalopram (CeleXA) 20 MG tablet Take 20 mg by mouth daily.     • hydrochlorothiazide (HYDRODIURIL) 25 MG tablet Take 25 mg by mouth daily.     • HYDROcodone-acetaminophen (LORTAB)  MG per tablet Take 1 tablet by mouth every 6 (six) hours as needed for moderate pain (4-6).     • levothyroxine (SYNTHROID, LEVOTHROID) 75 MCG tablet      • LUBRICANT EYE DROPS 0.4-0.3 % solution ophthalmic solution Administer 1 drop to both eyes at night as needed.     • metoprolol succinate XL (TOPROL-XL) 25 MG 24 hr tablet Take 1 tablet by mouth Daily. 90 tablet 3   • nitroglycerin (NITROSTAT) 0.4 MG SL tablet Place 0.4 mg under the tongue as needed.     • omeprazole (PriLOSEC) 20 MG capsule Take 20 mg by mouth daily.     • pravastatin (PRAVACHOL) 80 MG tablet Take 1 tablet by mouth Daily. 30 tablet 0   • tamsulosin (FLOMAX) 0.4 MG capsule 24 hr capsule Take 1 capsule by mouth every night.     • vitamin B-12 (CYANOCOBALAMIN) 1000 MCG tablet Take 1,000 mcg by mouth daily.     • vitamin C (ASCORBIC ACID) 500 MG tablet Take 1,000 mg by mouth Daily.     • amLODIPine (NORVASC) 10 MG tablet Take 10 mg by mouth daily.     • apixaban (ELIQUIS) 5 MG tablet tablet Take 1 tablet by mouth 2 (Two) Times a Day. 180 tablet 3   • aspirin 81 MG EC tablet Take 81 mg by mouth Daily.     • Aspirin Effervescent (JOÃO-SELTZER PO) Take  by mouth As Needed.     • naproxen sodium (ALEVE) 220 MG tablet Take 220 mg by mouth Daily As Needed " for Mild Pain (1-3).     • fentaNYL (DURAGESIC) 50 MCG/HR patch Place 1 patch on the skin Every 72 (Seventy-Two) Hours.     • gabapentin (NEURONTIN) 300 MG capsule Take 300 mg by mouth 3 (three) times a day.     • predniSONE (DELTASONE) 20 MG tablet Take 3 tablets (60 mg) in the morning for 2 days 6 tablet 0   • Vitamins A & D 5000-400 units capsule        No facility-administered medications prior to visit.        Reviewed use of high risk medication in the elderly: yes  Reviewed for potential of harmful drug interactions in the elderly: yes    The following portions of the patient's history were reviewed and updated as appropriate: past family history, past social history and past surgical history.    Review of Systems   Constitutional: Positive for fatigue. Negative for activity change, appetite change and unexpected weight loss.   HENT: Negative for congestion, ear pain, rhinorrhea, sinus pressure and sore throat.    Eyes: Negative for pain and itching.   Respiratory: Negative for cough, chest tightness, shortness of breath and wheezing.    Cardiovascular: Negative for chest pain, palpitations and leg swelling.   Gastrointestinal: Negative for abdominal pain, constipation, diarrhea, nausea, GERD and indigestion.   Endocrine: Positive for polyuria.   Genitourinary: Positive for nocturia and urgency. Negative for dysuria and hematuria.   Musculoskeletal: Positive for arthralgias, back pain and myalgias. Negative for gait problem and neck pain.   Skin: Negative for rash and skin lesions.   Neurological: Negative for dizziness, weakness, light-headedness, numbness, headache, memory problem and confusion.   Psychiatric/Behavioral: Positive for sleep disturbance. Negative for agitation, suicidal ideas and depressed mood. The patient is not nervous/anxious.         Vitals:    09/19/18 1538   BP: 116/72   BP Location: Right arm   Patient Position: Sitting   Cuff Size: Large Adult   Pulse: 53   SpO2: 95%   Weight: 91.4  "kg (201 lb 9.6 oz)   Height: 172.7 cm (68\")       Objective    Physical Exam   Constitutional: He is oriented to person, place, and time. He appears well-developed and well-nourished. No distress.   HENT:   Head: Normocephalic.   Eyes: Conjunctivae are normal.   Neck: Normal range of motion.   Cardiovascular: Normal rate, regular rhythm and normal heart sounds.    Pulmonary/Chest: Effort normal and breath sounds normal.   Musculoskeletal: Normal range of motion. He exhibits no edema.   Neurological: He is alert and oriented to person, place, and time.   Skin: Skin is warm. No rash noted. He is not diaphoretic. No erythema.   Psychiatric: He has a normal mood and affect. His behavior is normal. Judgment and thought content normal.   Vitals reviewed.       HEALTH RISK ASSESSMENT    1945    Recent Hospitalizations:  No hospitalization(s) within the last year..      Current Medical Providers:  Patient Care Team:  Deborah Henson PA-C as PCP - General (Physician Assistant)      Smoking Status:  History   Smoking Status   • Former Smoker   • Packs/day: 1.50   • Years: 50.00   • Types: Cigarettes   • Quit date: 6/15/2010   Smokeless Tobacco   • Never Used       Alcohol Consumption:  History   Alcohol Use   • Yes     Comment: beer occasional       Depression Screen:   PHQ-2/PHQ-9 Depression Screening 9/19/2018   Little interest or pleasure in doing things 3   Feeling down, depressed, or hopeless 1   Trouble falling or staying asleep, or sleeping too much 3   Feeling tired or having little energy 3   Poor appetite or overeating 1   Feeling bad about yourself - or that you are a failure or have let yourself or your family down 0   Trouble concentrating on things, such as reading the newspaper or watching television 2   Moving or speaking so slowly that other people could have noticed. Or the opposite - being so fidgety or restless that you have been moving around a lot more than usual 0   Thoughts that you would " be better off dead, or of hurting yourself in some way 0   Total Score 13   If you checked off any problems, how difficult have these problems made it for you to do your work, take care of things at home, or get along with other people? Not difficult at all       Health Habits and Functional and Cognitive Screening:  Functional & Cognitive Status 9/19/2018   Do you have difficulty preparing food and eating? No   Do you have difficulty bathing yourself, getting dressed or grooming yourself? No   Do you have difficulty using the toilet? No   Do you have difficulty moving around from place to place? Yes   Do you have trouble with steps or getting out of a bed or a chair? Yes   In the past year have you fallen or experienced a near fall? No   Current Diet Well Balanced Diet   Dental Exam Up to date   Eye Exam Up to date   Exercise (times per week) 0 times per week   Current Exercise Activities Include None   Do you need help using the phone?  No   Are you deaf or do you have serious difficulty hearing?  Yes   Do you need help with transportation? No   Do you need help shopping? No   Do you need help preparing meals?  No   Do you need help with housework?  No   Do you need help with laundry? No   Do you need help taking your medications? No   Do you need help managing money? No   Do you ever drive or ride in a car without wearing a seat belt? Yes   Have you felt unusual stress, anger or loneliness in the last month? Yes   Who do you live with? Spouse   If you need help, do you have trouble finding someone available to you? No   Have you been bothered in the last four weeks by sexual problems? No   Do you have difficulty concentrating, remembering or making decisions? Yes           Does the patient have evidence of cognitive impairment? Yes    Aspirin use counseling? Contraindicated from taking ASA      Recent Lab Results:  CMP:  Lab Results   Component Value Date     (H) 04/14/2017    BUN 14 04/14/2017     CREATININE 1.20 04/14/2017    EGFRIFNONA 60 (L) 04/14/2017    BCR 11.7 04/14/2017     (L) 04/14/2017    K 4.1 04/14/2017    CO2 30.0 04/14/2017    CALCIUM 9.7 04/14/2017    ALBUMIN 4.50 04/07/2017    BILITOT 0.6 04/07/2017    ALKPHOS 57 04/07/2017    AST 23 04/07/2017    ALT 5 (L) 04/07/2017     Lipid Panel:  Lab Results   Component Value Date    CHOL 151 06/16/2016    TRIG 85 06/16/2016    HDL 46 06/16/2016    VLDL 17 06/16/2016    LDLHDL 1.91 06/16/2016     HbA1c:  Lab Results   Component Value Date    HGBA1C 5.50 06/16/2016       Visual Acuity:  No exam data present    Age-appropriate Screening Schedule:  Refer to the list below for future screening recommendations based on patient's age, sex and/or medical conditions. Orders for these recommended tests are listed in the plan section. The patient has been provided with a written plan.    Health Maintenance   Topic Date Due   • ZOSTER VACCINE (1 of 2) 10/28/1996   • TDAP/TD VACCINES (1 - Tdap) 03/05/2010   • PNEUMOCOCCAL VACCINES (65+ LOW/MEDIUM RISK) (2 of 2 - PPSV23) 09/29/2016   • LIPID PANEL  06/16/2017   • INFLUENZA VACCINE  08/01/2018   • COLONOSCOPY  11/03/2022          Advance Care Planning:  patient reports that [facility] copy    Identification of Risk Factors:  Risk factors include: chronic pain, depression, incontinence and polypharmacy.    Compared to one year ago, the patient feels his physical health is worse.  Compared to one year ago, the patient feels his mental health is worse.      Fidencio was seen today for annual exam.    Diagnoses and all orders for this visit:    Medicare annual wellness visit, subsequent    Essential hypertension  -low blood pressure with symptomatic hypotension (dizziness with sitting to standing)  -will decrease amlodipine to 5 mg QD  -monitor at home  -fu in 2 weeks  -     amLODIPine (NORVASC) 5 MG tablet; Take 1 tablet by mouth Daily.    PAF (paroxysmal atrial fibrillation) (CMS/[facility])  -hasn't been  taking eliquis for 10 days, gave samples and sent in refill  -discussed avoiding ASA and NSAIDs while on eliquis, expressed again how important it is to stay on eliquis with atrial fibrillation  -     apixaban (ELIQUIS) 5 MG tablet tablet; Take 1 tablet by mouth Every 12 (Twelve) Hours.    Hypothyroidism, unspecified type  -on levothyroxine 75 mcg Daily  -     TSH; Future    Malignant neoplasm of larynx (CMS/HCC)  -no recurrent issues    Chronic pain syndrome  Chronic midline low back pain without sciatica  Chronic pain of left knee  -patient was established with Dr. Sagastume but they recently closed  -tapered off and discontinued fentanyl patches  -has significantly decreased lortab from 10 mg QID to lortab 5 mg TID  -taking gabapentin 300 mg TID  -he is completely out of lortab and gabapentin  -wanting to trial steroid injections for pain  -long discussion regarding pain management, agreed to fill 10 days worth of lortab and 30 days of gabapentin  -patient is aware our office will not fill any more lortab in the future  -refer to pain management for injections and overall management of pain  -will obtain UDS, contract and grady  -     Ambulatory Referral to Pain Management    Headache syndrome  -headache has resolved  -has appointment with neurologist in October and will still go incase headaches return  -stopped drinking so much diet soda and he thinks this has helped    Vitamin D deficiency  -taking vitamin D supplementation daily  -     Vitamin D 25 Hydroxy; Future    BPH with obstruction/lower urinary tract symptoms  -on fosamax  -patient reports having to go to the bathroom multiple times during the night  -he has not tried myrbetriq, may consider trial with samples at next visit    Atherosclerosis of coronary artery bypass graft of native heart without angina pectoris  -followed by Dr. Rodrigues    Gastroesophageal reflux disease without esophagitis  -on omeprazole 20 mg Qd    Chronic obstructive pulmonary  disease, unspecified COPD type (CMS/HCC)  -denies SOB, cough or wheezing  -not currently using any inhalers  -history of smoking, quit in 2008  -pending CT lung cancer screening    Hyperlipidemia, unspecified hyperlipidemia type  -on pravastatin 80 mg QD  -     Lipid Panel; Future    Physical exam, annual  -PE in 2 weeks  -     CBC Auto Differential; Future  -     Comprehensive Metabolic Panel; Future  -     Hemoglobin A1c; Future    Encounter for hepatitis C screening test for low risk patient  -     Hepatitis C Antibody; Future        Procedure   Procedures       Patient Self-Management and Personalized Health Advice  The patient has been provided with information about: diet, supplements and tapering off of pain medication and preventive services including:   · Influenza vaccine, Zostavax vaccine (Herpes Zoster).      Follow Up:  Return in about 2 weeks (around 10/3/2018) for Annual physical.     An After Visit Summary and PPPS with all of these plans were given to the patient.

## 2018-09-19 NOTE — PROGRESS NOTES
I have reviewed the notes, assessments, and/or procedures performed by MEENAKSHI Ball, I concur with her/his documentation of Fidencio Zarate Jr..

## 2018-09-19 NOTE — PATIENT INSTRUCTIONS
"-do not take aspirin or non-steroidal anti-inflammatories (aleve, ibuprofen, naproxen, advil or motrin) as these increase bleeding risk with eliquis    -call and schedule CT screening for lung cancer    -decrease amlodipine to 0.5 tablet or 5 mg daily (blood pressure)    -max dose of tylenol is 4000 mg day  -while you taper off of lortab, you may take 1000 mg every 12 hours and once you are off the lortab 1000 mg every 6-8 hours a day    -recommend magnesium 250 mg daily    General Health Maintenance:  -Annual dermatology exam  -Annual diabetic eye exam (if within the last year, please obtain records for our office and bring in to next visit or mail in)  -Staying current on your required colonoscopy, starts at age 50 unless there are other indications prior  -Self breast and/or testicular exams at least every month (easy to do in the shower)  -Yearly mammogram  -Regular papsmear with yearly pelvic and breast exam    Vaccines:  -Talk to pharmacist about shingrix shot  -Obtain flu shot when available  -Health department is recommending Hepatitis A vaccine    The largest impact you can have on your own health is getting the proper nutrition, exercise and maintaining an overall healthy body weight.  Proper nutrition involves eating lots of vegetables, fruit, minimal lean meat and avoiding processed foods and limiting refined sugars, carbohydrates and starches (\"the white stuff\").  Drinking at least 8 cups of water daily.  Walking at least 30 minutes a day and if possible, increasing this to a more cardiovascular aerobic exercise.  Maintaining a healthy body weight.      If you smoke or use tobacco products, quitting is extremely important and I am happy to discuss options with you regarding how to do this and what's available to assist you.    If you consume alcohol, limit your alcohol intake to 2 drinks a day for men and 1 drink a day for woman.    It is also important to make sure to keep regular appointments and have " all general health maintenance items completed in a timely manner.

## 2018-09-24 ENCOUNTER — RESULTS ENCOUNTER (OUTPATIENT)
Dept: FAMILY MEDICINE CLINIC | Facility: CLINIC | Age: 73
End: 2018-09-24

## 2018-09-24 VITALS
HEIGHT: 68 IN | BODY MASS INDEX: 30.55 KG/M2 | WEIGHT: 201.6 LBS | HEART RATE: 53 BPM | SYSTOLIC BLOOD PRESSURE: 116 MMHG | OXYGEN SATURATION: 95 % | DIASTOLIC BLOOD PRESSURE: 72 MMHG

## 2018-09-24 DIAGNOSIS — E55.9 VITAMIN D DEFICIENCY: ICD-10-CM

## 2018-09-24 DIAGNOSIS — Z11.59 ENCOUNTER FOR HEPATITIS C SCREENING TEST FOR LOW RISK PATIENT: ICD-10-CM

## 2018-09-24 DIAGNOSIS — E03.9 HYPOTHYROIDISM, UNSPECIFIED TYPE: ICD-10-CM

## 2018-09-24 DIAGNOSIS — E78.5 HYPERLIPIDEMIA, UNSPECIFIED HYPERLIPIDEMIA TYPE: ICD-10-CM

## 2018-09-24 DIAGNOSIS — Z12.5 PROSTATE CANCER SCREENING: ICD-10-CM

## 2018-09-24 DIAGNOSIS — Z00.00 PHYSICAL EXAM, ANNUAL: ICD-10-CM

## 2018-09-28 ENCOUNTER — LAB REQUISITION (OUTPATIENT)
Dept: LAB | Facility: HOSPITAL | Age: 73
End: 2018-09-28

## 2018-09-28 DIAGNOSIS — Z00.00 ROUTINE GENERAL MEDICAL EXAMINATION AT A HEALTH CARE FACILITY: ICD-10-CM

## 2018-09-28 PROCEDURE — 36415 COLL VENOUS BLD VENIPUNCTURE: CPT | Performed by: PHYSICIAN ASSISTANT

## 2018-09-29 LAB
25(OH)D3+25(OH)D2 SERPL-MCNC: 44 NG/ML
ALBUMIN SERPL-MCNC: 4.61 G/DL (ref 3.2–4.8)
ALBUMIN/GLOB SERPL: 2.2 G/DL (ref 1.5–2.5)
ALP SERPL-CCNC: 55 U/L (ref 25–100)
ALT SERPL-CCNC: 13 U/L (ref 7–40)
AST SERPL-CCNC: 24 U/L (ref 0–33)
BASOPHILS # BLD AUTO: 0.03 10*3/MM3 (ref 0–0.2)
BASOPHILS NFR BLD AUTO: 0.4 % (ref 0–1)
BILIRUB SERPL-MCNC: 0.5 MG/DL (ref 0.3–1.2)
BUN SERPL-MCNC: 8 MG/DL (ref 9–23)
BUN/CREAT SERPL: 6.3 (ref 7–25)
CALCIUM SERPL-MCNC: 9.6 MG/DL (ref 8.7–10.4)
CHLORIDE SERPL-SCNC: 94 MMOL/L (ref 99–109)
CHOLEST SERPL-MCNC: 220 MG/DL (ref 0–200)
CO2 SERPL-SCNC: 27 MMOL/L (ref 20–31)
CREAT SERPL-MCNC: 1.28 MG/DL (ref 0.6–1.3)
EOSINOPHIL # BLD AUTO: 0.14 10*3/MM3 (ref 0–0.3)
EOSINOPHIL NFR BLD AUTO: 1.6 % (ref 0–3)
ERYTHROCYTE [DISTWIDTH] IN BLOOD BY AUTOMATED COUNT: 16 % (ref 11.3–14.5)
GLOBULIN SER CALC-MCNC: 2.1 GM/DL
GLUCOSE SERPL-MCNC: 144 MG/DL (ref 70–100)
HBA1C MFR BLD: 6.7 % (ref 4.8–5.6)
HCT VFR BLD AUTO: 41.8 % (ref 38.9–50.9)
HCV AB S/CO SERPL IA: <0.1 S/CO RATIO (ref 0–0.9)
HDLC SERPL-MCNC: 53 MG/DL (ref 40–60)
HGB BLD-MCNC: 12.8 G/DL (ref 13.1–17.5)
IMM GRANULOCYTES # BLD: 0.02 10*3/MM3 (ref 0–0.03)
IMM GRANULOCYTES NFR BLD: 0.2 % (ref 0–0.6)
LDLC SERPL CALC-MCNC: 115 MG/DL (ref 0–100)
LYMPHOCYTES # BLD AUTO: 1.94 10*3/MM3 (ref 0.6–4.8)
LYMPHOCYTES NFR BLD AUTO: 22.7 % (ref 24–44)
MCH RBC QN AUTO: 26.3 PG (ref 27–31)
MCHC RBC AUTO-ENTMCNC: 30.6 G/DL (ref 32–36)
MCV RBC AUTO: 85.8 FL (ref 80–99)
MONOCYTES # BLD AUTO: 0.76 10*3/MM3 (ref 0–1)
MONOCYTES NFR BLD AUTO: 8.9 % (ref 0–12)
NEUTROPHILS # BLD AUTO: 5.64 10*3/MM3 (ref 1.5–8.3)
NEUTROPHILS NFR BLD AUTO: 66.2 % (ref 41–71)
PLATELET # BLD AUTO: 224 10*3/MM3 (ref 150–450)
POTASSIUM SERPL-SCNC: 3.8 MMOL/L (ref 3.5–5.5)
PROT SERPL-MCNC: 6.7 G/DL (ref 5.7–8.2)
PSA SERPL-MCNC: 0.4 NG/ML (ref 0–4)
RBC # BLD AUTO: 4.87 10*6/MM3 (ref 4.2–5.76)
SODIUM SERPL-SCNC: 138 MMOL/L (ref 132–146)
TRIGL SERPL-MCNC: 262 MG/DL (ref 0–150)
TSH SERPL DL<=0.005 MIU/L-ACNC: 2.08 MIU/ML (ref 0.35–5.35)
VLDLC SERPL CALC-MCNC: 52.4 MG/DL
WBC # BLD AUTO: 8.53 10*3/MM3 (ref 3.5–10.8)

## 2018-10-02 ENCOUNTER — TELEPHONE (OUTPATIENT)
Dept: FAMILY MEDICINE CLINIC | Facility: CLINIC | Age: 73
End: 2018-10-02

## 2018-10-02 NOTE — TELEPHONE ENCOUNTER
PT'S WIFE CALLED IN WANTING TO TALK TO [facility] FOR TODD LUONG    PT WOULD LIKE CALL BACK [phone]

## 2018-10-05 RX ORDER — OMEGA-3-ACID ETHYL ESTERS 1 G/1
2 CAPSULE, LIQUID FILLED ORAL 2 TIMES DAILY
Qty: 120 CAPSULE | Refills: 3 | Status: SHIPPED | OUTPATIENT
Start: 2018-10-05 | End: 2018-10-11 | Stop reason: SDUPTHER

## 2018-10-11 ENCOUNTER — RESULTS ENCOUNTER (OUTPATIENT)
Dept: FAMILY MEDICINE CLINIC | Facility: CLINIC | Age: 73
End: 2018-10-11

## 2018-10-11 ENCOUNTER — OFFICE VISIT (OUTPATIENT)
Dept: FAMILY MEDICINE CLINIC | Facility: CLINIC | Age: 73
End: 2018-10-11

## 2018-10-11 VITALS
DIASTOLIC BLOOD PRESSURE: 80 MMHG | BODY MASS INDEX: 30.86 KG/M2 | HEART RATE: 55 BPM | SYSTOLIC BLOOD PRESSURE: 132 MMHG | HEIGHT: 68 IN | WEIGHT: 203.6 LBS | OXYGEN SATURATION: 96 %

## 2018-10-11 DIAGNOSIS — I25.810 ATHEROSCLEROSIS OF CORONARY ARTERY BYPASS GRAFT OF NATIVE HEART WITHOUT ANGINA PECTORIS: ICD-10-CM

## 2018-10-11 DIAGNOSIS — I48.0 PAF (PAROXYSMAL ATRIAL FIBRILLATION) (HCC): ICD-10-CM

## 2018-10-11 DIAGNOSIS — G89.29 CHRONIC MIDLINE LOW BACK PAIN WITHOUT SCIATICA: ICD-10-CM

## 2018-10-11 DIAGNOSIS — I10 ESSENTIAL HYPERTENSION: Primary | ICD-10-CM

## 2018-10-11 DIAGNOSIS — F32.A DEPRESSION, UNSPECIFIED DEPRESSION TYPE: ICD-10-CM

## 2018-10-11 DIAGNOSIS — M54.50 CHRONIC MIDLINE LOW BACK PAIN WITHOUT SCIATICA: ICD-10-CM

## 2018-10-11 DIAGNOSIS — E11.22 TYPE 2 DIABETES MELLITUS WITH STAGE 1 CHRONIC KIDNEY DISEASE, WITHOUT LONG-TERM CURRENT USE OF INSULIN (HCC): ICD-10-CM

## 2018-10-11 DIAGNOSIS — G44.89 HEADACHE SYNDROME: ICD-10-CM

## 2018-10-11 DIAGNOSIS — G89.4 CHRONIC PAIN SYNDROME: ICD-10-CM

## 2018-10-11 DIAGNOSIS — I50.9 ACUTE HEART FAILURE, UNSPECIFIED HEART FAILURE TYPE (HCC): ICD-10-CM

## 2018-10-11 DIAGNOSIS — N18.1 TYPE 2 DIABETES MELLITUS WITH STAGE 1 CHRONIC KIDNEY DISEASE, WITHOUT LONG-TERM CURRENT USE OF INSULIN (HCC): ICD-10-CM

## 2018-10-11 DIAGNOSIS — E03.9 HYPOTHYROIDISM, UNSPECIFIED TYPE: ICD-10-CM

## 2018-10-11 DIAGNOSIS — E78.5 HYPERLIPIDEMIA, UNSPECIFIED HYPERLIPIDEMIA TYPE: ICD-10-CM

## 2018-10-11 DIAGNOSIS — M25.562 CHRONIC PAIN OF LEFT KNEE: ICD-10-CM

## 2018-10-11 DIAGNOSIS — G89.29 CHRONIC PAIN OF LEFT KNEE: ICD-10-CM

## 2018-10-11 PROCEDURE — 99214 OFFICE O/P EST MOD 30 MIN: CPT | Performed by: PHYSICIAN ASSISTANT

## 2018-10-11 RX ORDER — DULOXETIN HYDROCHLORIDE 30 MG/1
30 CAPSULE, DELAYED RELEASE ORAL DAILY
Qty: 90 CAPSULE | Refills: 3 | Status: SHIPPED | OUTPATIENT
Start: 2018-10-11 | End: 2018-12-20

## 2018-10-11 RX ORDER — OMEGA-3-ACID ETHYL ESTERS 1 G/1
2 CAPSULE, LIQUID FILLED ORAL 2 TIMES DAILY
Qty: 360 CAPSULE | Refills: 3 | Status: SHIPPED | OUTPATIENT
Start: 2018-10-11 | End: 2018-12-20

## 2018-10-11 RX ORDER — LISINOPRIL 10 MG/1
10 TABLET ORAL DAILY
Qty: 90 TABLET | Refills: 3 | Status: SHIPPED | OUTPATIENT
Start: 2018-10-11 | End: 2018-10-15 | Stop reason: SDUPTHER

## 2018-10-11 NOTE — PROGRESS NOTES
Chief Complaint   Patient presents with   • Follow-up     Pt states that he is out of his pain medication, weaned himself off but believes that he needs something.       HPI     Fidencio Zarate Jr. is a pleasant 72 y.o. male who is here for routine follow-up of recent appointment on 09/19/18.  Patient's past medical history is significant for atrial fibrillation, hypertension, hyperlipidemia, MI, chronic back and left knee pain, osteoarthritis, depression, headache and type 2 diabetes.  Patient is taking his eliquis 5 mg BID and is avoiding ASA and NSAIDs.  He denies history of acute CHF and we will evaluate with blood work today.  Last echo showed normal EF.  His blood pressure is improved since previous visit and he has less dizziness now.  He has not started lovaza.  His recent labs indicate new diagnosis of diabetes.  He has been having headaches recently and is followed by neurology for this.  He has chronic back and left knee pain.  He has weaned himself off of lortab and went through withdrawals consisting of diarrhea, headache, depression and worsening pain.  He is still taking gabapentin 300mg TID and is unsure how much this is helping.  He has never tried cymbalta.  He reports worsening depression with increased pain.  He is interested in going to pain management for possible injections, no desire to return to pain medication.  He has significant pain in his left knee but hasn't had any recent injections, imaging or evaluations by ortho.    Past Medical History:   Diagnosis Date   • Arthritis    • Cancer (CMS/HCC)     thoat cancer   • Coronary artery disease    • Gout    • History of chemotherapy    • Hyperlipidemia    • Hypertension    • Injury of back    • Low back pain    • Myocardial infarction (CMS/HCC)     x2   • PAF (paroxysmal atrial fibrillation) (CMS/HCC)    • Rotator cuff rupture    • Uses hearing aid     bilat        Past Surgical History:   Procedure Laterality Date   • APPENDECTOMY     •  "CARDIAC CATHETERIZATION      x1 stent   • CARDIAC CATHETERIZATION N/A 6/17/2016    Procedure: Left Heart Cath;  Surgeon: Son Lam MD;  Location:  [facility];  Service:    • CARDIAC SURGERY     • CATARACT EXTRACTION  2009   • CHOLECYSTECTOMY LAPAROSCOPIC INTRAOPERATIVE CHOLANGIOGRAM WITH LIVER BIOPSY N/A 7/8/2016    Procedure: CHOLECYSTECTOMY LAPAROSCOPIC WITH LIVER BIOPSY;  Surgeon: Son Andrade MD;  Location:  [facility];  Service:    • COLONOSCOPY      x2   • CORONARY ARTERY BYPASS GRAFT     • FOOT SURGERY  1962    right    • FRACTURE SURGERY     • LASIK     • AL ERCP DX COLLECTION SPECIMEN BRUSHING/WASHING N/A 6/24/2016    Procedure: ENDOSCOPIC RETROGRADE CHOLANGIOPANCREATOGRAPHY;  Surgeon: Omari Cardenas MD;  Location:  [facility];  Service: Gastroenterology   • TONSILLECTOMY     • WISDOM TOOTH EXTRACTION         Family History   Problem Relation Age of Onset   • COPD Mother    • Depression Mother    • Hypertension Mother        Social History     Social History   • Marital status:      Spouse name: N/A   • Number of children: N/A   • Years of education: N/A     Occupational History   • Not on file.     Social History Main Topics   • Smoking status: Former Smoker     Packs/day: 1.50     Years: 50.00     Types: Cigarettes     Quit date: 6/15/2010   • Smokeless tobacco: Never Used   • Alcohol use Yes      Comment: beer occasional   • Drug use: No   • Sexual activity: Defer     Other Topics Concern   • Not on file     Social History Narrative    Patient consumes 4 diet pepsi sodas daily.     Patient lives at home with wife.            Allergies   Allergen Reactions   • Penicillins Rash and Other (See Comments)     \"flu-like\" symptoms   • Crestor [Rosuvastatin Calcium] Other (See Comments) and Myalgia     Arthralgias   • Lipitor [Atorvastatin] Other (See Comments) and Myalgia     Arthralgias   • Zocor [Simvastatin] Other (See Comments) and Myalgia     " "Arthralgias       ROS    Review of Systems   Constitutional: Positive for fatigue. Negative for activity change, appetite change, chills, diaphoresis, fever, unexpected weight gain and unexpected weight loss.   HENT: Negative for congestion, dental problem, ear pain, hearing loss, nosebleeds, sinus pressure, sore throat and trouble swallowing.    Eyes: Negative for blurred vision, pain, redness and visual disturbance.   Respiratory: Negative for apnea, cough, chest tightness, shortness of breath and wheezing.    Cardiovascular: Negative for chest pain, palpitations and leg swelling.   Gastrointestinal: Positive for diarrhea. Negative for abdominal distention, abdominal pain, anal bleeding, blood in stool, constipation, nausea, vomiting, GERD and indigestion.   Endocrine: Negative for cold intolerance, heat intolerance, polydipsia, polyphagia and polyuria.   Genitourinary: Negative for urinary incontinence, decreased urine volume, difficulty urinating, dysuria, frequency, hematuria and urgency.   Musculoskeletal: Positive for arthralgias, back pain, gait problem, joint swelling and myalgias. Negative for bursitis.   Skin: Negative for dry skin, rash, skin lesions and bruise.   Neurological: Positive for headache. Negative for dizziness, tremors, seizures, syncope, speech difficulty, weakness, light-headedness, memory problem and confusion.   Hematological: Does not bruise/bleed easily.   Psychiatric/Behavioral: Positive for sleep disturbance, depressed mood and stress. Negative for agitation, behavioral problems, decreased concentration, hallucinations and suicidal ideas. The patient is not nervous/anxious.        Vitals:    10/11/18 1545   BP: 132/80   BP Location: Right arm   Patient Position: Sitting   Cuff Size: Large Adult   Pulse: 55   SpO2: 96%   Weight: 92.4 kg (203 lb 9.6 oz)   Height: 172.7 cm (68\")         Current Outpatient Prescriptions:   •  allopurinol (ZYLOPRIM) 300 MG tablet, Take 1 tablet by mouth " Daily., Disp: 90 tablet, Rfl: 3  •  apixaban (ELIQUIS) 5 MG tablet tablet, Take 1 tablet by mouth Every 12 (Twelve) Hours., Disp: 180 tablet, Rfl: 3  •  cholecalciferol (VITAMIN D3) 53591 units capsule, Take 1 capsule by mouth 1 (One) Time Per Week., Disp: 12 capsule, Rfl: 1  •  citalopram (CeleXA) 20 MG tablet, Take 1 tablet by mouth Daily., Disp: 90 tablet, Rfl: 3  •  gabapentin (NEURONTIN) 300 MG capsule, Take 1 capsule by mouth 3 (Three) Times a Day., Disp: 90 capsule, Rfl: 1  •  levothyroxine (SYNTHROID, LEVOTHROID) 75 MCG tablet, Take 1 tablet by mouth Daily., Disp: 90 tablet, Rfl: 3  •  LUBRICANT EYE DROPS 0.4-0.3 % solution ophthalmic solution, Administer 1 drop to both eyes at night as needed., Disp: , Rfl:   •  metoprolol succinate XL (TOPROL-XL) 25 MG 24 hr tablet, Take 1 tablet by mouth Daily., Disp: 90 tablet, Rfl: 3  •  nitroglycerin (NITROSTAT) 0.4 MG SL tablet, Place 0.4 mg under the tongue as needed., Disp: , Rfl:   •  omega-3 acid ethyl esters (LOVAZA) 1 g capsule, Take 2 capsules by mouth 2 (Two) Times a Day for 90 days., Disp: 360 capsule, Rfl: 3  •  omeprazole (priLOSEC) 20 MG capsule, Take 1 capsule by mouth Daily., Disp: 90 capsule, Rfl: 3  •  pravastatin (PRAVACHOL) 80 MG tablet, Take 1 tablet by mouth Daily., Disp: 90 tablet, Rfl: 3  •  tamsulosin (FLOMAX) 0.4 MG capsule 24 hr capsule, Take 1 capsule by mouth Every Night., Disp: 90 capsule, Rfl: 3  •  vitamin B-12 (CYANOCOBALAMIN) 1000 MCG tablet, Take 1,000 mcg by mouth daily., Disp: , Rfl:   •  vitamin C (ASCORBIC ACID) 500 MG tablet, Take 1,000 mg by mouth Daily., Disp: , Rfl:   •  DULoxetine (CYMBALTA) 30 MG capsule, Take 1 capsule by mouth Daily., Disp: 90 capsule, Rfl: 3  •  lisinopril (PRINIVIL,ZESTRIL) 10 MG tablet, Take 1 tablet by mouth Daily., Disp: 90 tablet, Rfl: 3  •  metFORMIN (GLUCOPHAGE) 500 MG tablet, Take 1 tablet by mouth 2 (Two) Times a Day With Meals for 90 days., Disp: 180 tablet, Rfl: 3    PE    Physical Exam    Constitutional: He is oriented to person, place, and time. Vital signs are normal. He appears well-developed and well-nourished. He is active and cooperative. No distress.   HENT:   Head: Normocephalic and atraumatic.   Eyes: Conjunctivae are normal.   Neck: Normal range of motion.   Cardiovascular: Normal rate.  An irregular rhythm present. Exam reveals distant heart sounds.    Pulmonary/Chest: Effort normal and breath sounds normal.   Musculoskeletal: Normal range of motion. He exhibits no edema.   Neurological: He is alert and oriented to person, place, and time.   Skin: Skin is warm. No rash noted. He is not diaphoretic. No erythema.   Psychiatric: He has a normal mood and affect. His speech is normal and behavior is normal. Judgment and thought content normal. He is not actively hallucinating. Cognition and memory are normal. He is attentive.   Vitals reviewed.      SHAILESH/KARMA Nicolas was seen today for follow-up.    Diagnoses and all orders for this visit:    Essential hypertension  -BP improved with decreased amlodipine  -will dc HCTZ as patient has significant urinary frequency  -will dc amlodipine completely  -remain on metoprolol  -start lisinopril with new diagnosis of diabetes  -     lisinopril (PRINIVIL,ZESTRIL) 10 MG tablet; Take 1 tablet by mouth Daily.    Hyperlipidemia, unspecified hyperlipidemia type  -on pravastatin 80 mg QD  -unable to tolerate anything stronger in statins due to myalgias  -will trial lovaza  -     omega-3 acid ethyl esters (LOVAZA) 1 g capsule; Take 2 capsules by mouth 2 (Two) Times a Day for 90 days.    Chronic midline low back pain without sciatica  -ongoing pain worse now that he is not taking pain medication  -will refer to pain management for possible injections  -MRI in 2017 did not show any significant anatomical issues other than arthritic changes  -history of back surgery per patient    Chronic pain of left knee  -has stopped taking all pain medication and left knee pain is  bothering him whenever he walks for any period of time  -no recent imaging, injections and has never had an evaluation by ortho within the recent years  -     XR Knee 4+ View Left; Future  -     Ambulatory Referral to Orthopedic Surgery    PAF (paroxysmal atrial fibrillation) (CMS/[facility])  -on eliquis 5 mg BID    Type 2 diabetes mellitus with stage 1 chronic kidney disease, without long-term current use of insulin (CMS/[facility])  -new diagnosis of diabetes  -will start metformin 500 mg BID  -discussed diet   -will order microalbumin  -     metFORMIN (GLUCOPHAGE) 500 MG tablet; Take 1 tablet by mouth 2 (Two) Times a Day With Meals for 90 days.  -     Microalbumin / Creatinine Urine Ratio - Urine, Clean Catch; Future    Headache syndrome  -followed by neurology with botox injections  -improved but still has episodic headache, may be due to withdrawal from pain medication    Hypothyroidism, unspecified type  -on levothryoxine  -stable recent TSH    Atherosclerosis of coronary artery bypass graft of native heart without angina pectoris  -followed by cardiology    Acute heart failure, unspecified heart failure type (CMS/[facility])  -     BNP; Future  -echo in 2016 showed normal EF  -no lower extremity edema, shortness of breath or cough  -will dc HCTZ and order BNP    Chronic pain syndrome  -discontinued all pain medication  -taking gabapentin 300 mg TID  -will trial cymbalta 30 mg QD, may increase to BID at fu if tolerated well  -referred to pain management, pending consult  -     DULoxetine (CYMBALTA) 30 MG capsule; Take 1 capsule by mouth Daily.    Depression, unspecified depression type  -worsening depression most likely due to increased pain  -add cymbalta to help with both  -     DULoxetine (CYMBALTA) 30 MG capsule; Take 1 capsule by mouth Daily.         Plan of care reviewed with patient at the conclusion of today's visit. Education was provided regarding diagnosis, management and any prescribed or recommended OTC medications.   Patient verbalizes understanding of and agreement with management plan.    Return in about 10 weeks (around 12/20/2018) for Next scheduled follow up.     Deborah Henson PA-C

## 2018-10-11 NOTE — PATIENT INSTRUCTIONS
-stop hydrochlorathiazide (HCTZ)  -stop amlodipine 5 mg daily  -start lisinopril 10 mg daily  -start lovaza generic omega 3 ethyl-esters   -start metformin 500 mg at night x7 days, then increase to 1 tablet in the morning and 1 tablet at night  -start cymbalta (duloxetine) 30 mg daily    -need to get CT low dose lung cancer screening    -monitor blood pressure, call if it is above 140/90 consistently

## 2018-10-12 ENCOUNTER — HOSPITAL ENCOUNTER (OUTPATIENT)
Dept: GENERAL RADIOLOGY | Facility: HOSPITAL | Age: 73
Discharge: HOME | End: 2018-10-12
Admitting: PHYSICIAN ASSISTANT

## 2018-10-12 ENCOUNTER — LAB REQUISITION (OUTPATIENT)
Dept: LAB | Facility: HOSPITAL | Age: 73
End: 2018-10-12

## 2018-10-12 DIAGNOSIS — M25.562 CHRONIC PAIN OF LEFT KNEE: ICD-10-CM

## 2018-10-12 DIAGNOSIS — G89.29 CHRONIC PAIN OF LEFT KNEE: ICD-10-CM

## 2018-10-12 DIAGNOSIS — Z00.00 ROUTINE GENERAL MEDICAL EXAMINATION AT A HEALTH CARE FACILITY: ICD-10-CM

## 2018-10-12 LAB — BNP SERPL-MCNC: 293 PG/ML (ref 0–100)

## 2018-10-12 PROCEDURE — 83880 ASSAY OF NATRIURETIC PEPTIDE: CPT | Performed by: PHYSICIAN ASSISTANT

## 2018-10-12 PROCEDURE — 73560 X-RAY EXAM OF KNEE 1 OR 2: CPT

## 2018-10-12 PROCEDURE — 36415 COLL VENOUS BLD VENIPUNCTURE: CPT | Performed by: PHYSICIAN ASSISTANT

## 2018-10-15 DIAGNOSIS — I25.810 ATHEROSCLEROSIS OF CORONARY ARTERY BYPASS GRAFT OF NATIVE HEART WITHOUT ANGINA PECTORIS: ICD-10-CM

## 2018-10-15 DIAGNOSIS — G44.89 HEADACHE SYNDROME: ICD-10-CM

## 2018-10-15 DIAGNOSIS — M25.562 CHRONIC PAIN OF LEFT KNEE: ICD-10-CM

## 2018-10-15 DIAGNOSIS — N18.1 TYPE 2 DIABETES MELLITUS WITH STAGE 1 CHRONIC KIDNEY DISEASE, WITHOUT LONG-TERM CURRENT USE OF INSULIN (HCC): ICD-10-CM

## 2018-10-15 DIAGNOSIS — E78.5 HYPERLIPIDEMIA, UNSPECIFIED HYPERLIPIDEMIA TYPE: ICD-10-CM

## 2018-10-15 DIAGNOSIS — E11.22 TYPE 2 DIABETES MELLITUS WITH STAGE 1 CHRONIC KIDNEY DISEASE, WITHOUT LONG-TERM CURRENT USE OF INSULIN (HCC): ICD-10-CM

## 2018-10-15 DIAGNOSIS — M54.50 CHRONIC MIDLINE LOW BACK PAIN WITHOUT SCIATICA: ICD-10-CM

## 2018-10-15 DIAGNOSIS — I50.9 ACUTE HEART FAILURE, UNSPECIFIED HEART FAILURE TYPE (HCC): ICD-10-CM

## 2018-10-15 DIAGNOSIS — I10 ESSENTIAL HYPERTENSION: ICD-10-CM

## 2018-10-15 DIAGNOSIS — I48.0 PAF (PAROXYSMAL ATRIAL FIBRILLATION) (HCC): ICD-10-CM

## 2018-10-15 DIAGNOSIS — G89.29 CHRONIC MIDLINE LOW BACK PAIN WITHOUT SCIATICA: ICD-10-CM

## 2018-10-15 DIAGNOSIS — G89.29 CHRONIC PAIN OF LEFT KNEE: ICD-10-CM

## 2018-10-15 DIAGNOSIS — F32.A DEPRESSION, UNSPECIFIED DEPRESSION TYPE: ICD-10-CM

## 2018-10-15 DIAGNOSIS — G89.4 CHRONIC PAIN SYNDROME: ICD-10-CM

## 2018-10-15 DIAGNOSIS — E03.9 HYPOTHYROIDISM, UNSPECIFIED TYPE: ICD-10-CM

## 2018-10-15 RX ORDER — LISINOPRIL 10 MG/1
5 TABLET ORAL DAILY
Qty: 90 TABLET | Refills: 3 | Status: SHIPPED | OUTPATIENT
Start: 2018-10-15 | End: 2018-12-20 | Stop reason: SDUPTHER

## 2018-10-15 RX ORDER — CHLORTHALIDONE 25 MG/1
25 TABLET ORAL DAILY
Qty: 90 TABLET | Refills: 1 | Status: SHIPPED | OUTPATIENT
Start: 2018-10-15 | End: 2019-01-01

## 2018-10-16 ENCOUNTER — TELEPHONE (OUTPATIENT)
Dept: FAMILY MEDICINE CLINIC | Facility: CLINIC | Age: 73
End: 2018-10-16

## 2018-10-17 LAB
ALBUMIN/CREAT UR: 203.5 MG/G CREAT (ref 0–30)
CREAT UR-MCNC: 64.8 MG/DL
MICROALBUMIN UR-MCNC: 131.9 UG/ML

## 2018-10-18 ENCOUNTER — OFFICE VISIT (OUTPATIENT)
Dept: ORTHOPEDIC SURGERY | Facility: CLINIC | Age: 73
End: 2018-10-18

## 2018-10-18 VITALS — BODY MASS INDEX: 30.67 KG/M2 | OXYGEN SATURATION: 98 % | WEIGHT: 202.38 LBS | HEIGHT: 68 IN | HEART RATE: 64 BPM

## 2018-10-18 DIAGNOSIS — M17.12 PRIMARY OSTEOARTHRITIS OF LEFT KNEE: Primary | ICD-10-CM

## 2018-10-18 PROCEDURE — 20610 DRAIN/INJ JOINT/BURSA W/O US: CPT | Performed by: ORTHOPAEDIC SURGERY

## 2018-10-18 PROCEDURE — 99214 OFFICE O/P EST MOD 30 MIN: CPT | Performed by: ORTHOPAEDIC SURGERY

## 2018-10-18 RX ORDER — BUPIVACAINE HYDROCHLORIDE 2.5 MG/ML
3 INJECTION, SOLUTION INFILTRATION; PERINEURAL
Status: COMPLETED | OUTPATIENT
Start: 2018-10-18 | End: 2018-10-18

## 2018-10-18 RX ORDER — HYDROCODONE BITARTRATE AND ACETAMINOPHEN 10; 325 MG/1; MG/1
1 TABLET ORAL EVERY 6 HOURS PRN
COMMUNITY
Start: 2018-10-16 | End: 2019-01-01

## 2018-10-18 RX ORDER — LIDOCAINE HYDROCHLORIDE 10 MG/ML
3 INJECTION, SOLUTION INFILTRATION; PERINEURAL
Status: COMPLETED | OUTPATIENT
Start: 2018-10-18 | End: 2018-10-18

## 2018-10-18 RX ORDER — TRIAMCINOLONE ACETONIDE 40 MG/ML
80 INJECTION, SUSPENSION INTRA-ARTICULAR; INTRAMUSCULAR
Status: COMPLETED | OUTPATIENT
Start: 2018-10-18 | End: 2018-10-18

## 2018-10-18 RX ADMIN — LIDOCAINE HYDROCHLORIDE 3 ML: 10 INJECTION, SOLUTION INFILTRATION; PERINEURAL at 11:56

## 2018-10-18 RX ADMIN — BUPIVACAINE HYDROCHLORIDE 3 ML: 2.5 INJECTION, SOLUTION INFILTRATION; PERINEURAL at 11:56

## 2018-10-18 RX ADMIN — TRIAMCINOLONE ACETONIDE 80 MG: 40 INJECTION, SUSPENSION INTRA-ARTICULAR; INTRAMUSCULAR at 11:56

## 2018-10-18 NOTE — PROGRESS NOTES
Orthopaedic Clinic Note: Knee New Patient    Chief Complaint   Patient presents with   • Left Knee - Pain        HPI  Consult from: Deborah Henson, *    Fidencio Zarate Jr. is a 72 y.o. male who presents with left knee pain for 17 year(s). Onset has been atraumatic and gradual in nature.  He is recently wean himself off of narcotic pain medications and is only now taking Lortab.  He is complaining of pain globally throughout his body but his knee pain is primarily along the medial joint line and anterior knee.  He rates it a 9/10 on the pain scale.  Walking, standing, climbing stairs increases his pain.  As a constant dull ache with occasional sharp stabbing pain with swelling and stiffness.  Previous treatments have included physical therapy, anti-inflammatories, and cortisone injections 5 or 6 years ago.  No recent interventions have been tried to date.  He is ambulating with no assistive device today.  He is here today to discuss treatment options for his ongoing pain.    Past Medical History:   Diagnosis Date   • Arthritis    • Cancer (CMS/HCC)     thoat cancer   • Coronary artery disease    • Gout    • History of chemotherapy    • Hyperlipidemia    • Hypertension    • Injury of back    • Low back pain    • Myocardial infarction (CMS/HCC)     x2   • PAF (paroxysmal atrial fibrillation) (CMS/HCC)    • Rotator cuff rupture    • Uses hearing aid     bilat       Past Surgical History:   Procedure Laterality Date   • APPENDECTOMY     • CARDIAC CATHETERIZATION      x1 stent   • CARDIAC CATHETERIZATION N/A 6/17/2016    Procedure: Left Heart Cath;  Surgeon: Son Lam MD;  Location: [facility];  Service:    • CARDIAC SURGERY     • CATARACT EXTRACTION  2009   • CHOLECYSTECTOMY LAPAROSCOPIC INTRAOPERATIVE CHOLANGIOGRAM WITH LIVER BIOPSY N/A 7/8/2016    Procedure: CHOLECYSTECTOMY LAPAROSCOPIC WITH LIVER BIOPSY;  Surgeon: Son Andrade MD;  Location:  [facility];  Service:    •  COLONOSCOPY      x2   • CORONARY ARTERY BYPASS GRAFT     • FOOT SURGERY  1962    right    • FRACTURE SURGERY     • LASIK     • MA ERCP DX COLLECTION SPECIMEN BRUSHING/WASHING N/A 6/24/2016    Procedure: ENDOSCOPIC RETROGRADE CHOLANGIOPANCREATOGRAPHY;  Surgeon: Omari Cardenas MD;  Location: [facility];  Service: Gastroenterology   • TONSILLECTOMY     • WISDOM TOOTH EXTRACTION        Family History   Problem Relation Age of Onset   • COPD Mother    • Depression Mother    • Hypertension Mother      Social History     Social History   • Marital status:      Spouse name: N/A   • Number of children: N/A   • Years of education: N/A     Occupational History   • Not on file.     Social History Main Topics   • Smoking status: Former Smoker     Packs/day: 1.50     Years: 50.00     Types: Cigarettes     Quit date: 6/15/2010   • Smokeless tobacco: Never Used   • Alcohol use Yes      Comment: beer occasional   • Drug use: No   • Sexual activity: Defer     Other Topics Concern   • Not on file     Social History Narrative    Patient consumes 4 diet pepsi sodas daily.     Patient lives at home with wife.           Current Outpatient Prescriptions on File Prior to Visit   Medication Sig Dispense Refill   • allopurinol (ZYLOPRIM) 300 MG tablet Take 1 tablet by mouth Daily. 90 tablet 3   • apixaban (ELIQUIS) 5 MG tablet tablet Take 1 tablet by mouth Every 12 (Twelve) Hours. 180 tablet 3   • chlorthalidone (HYGROTON) 25 MG tablet Take 1 tablet by mouth Daily. 90 tablet 1   • cholecalciferol (VITAMIN D3) 36553 units capsule Take 1 capsule by mouth 1 (One) Time Per Week. 12 capsule 1   • citalopram (CeleXA) 20 MG tablet Take 1 tablet by mouth Daily. 90 tablet 3   • DULoxetine (CYMBALTA) 30 MG capsule Take 1 capsule by mouth Daily. 90 capsule 3   • gabapentin (NEURONTIN) 300 MG capsule Take 1 capsule by mouth 3 (Three) Times a Day. 90 capsule 1   • levothyroxine (SYNTHROID, LEVOTHROID) 75 MCG tablet Take 1 tablet  "by mouth Daily. 90 tablet 3   • lisinopril (PRINIVIL,ZESTRIL) 10 MG tablet Take 0.5 tablets by mouth Daily. 90 tablet 3   • LUBRICANT EYE DROPS 0.4-0.3 % solution ophthalmic solution Administer 1 drop to both eyes at night as needed.     • metFORMIN (GLUCOPHAGE) 500 MG tablet Take 1 tablet by mouth 2 (Two) Times a Day With Meals for 90 days. 180 tablet 3   • metoprolol succinate XL (TOPROL-XL) 25 MG 24 hr tablet Take 1 tablet by mouth Daily. 90 tablet 3   • nitroglycerin (NITROSTAT) 0.4 MG SL tablet Place 0.4 mg under the tongue as needed.     • omega-3 acid ethyl esters (LOVAZA) 1 g capsule Take 2 capsules by mouth 2 (Two) Times a Day for 90 days. 360 capsule 3   • omeprazole (priLOSEC) 20 MG capsule Take 1 capsule by mouth Daily. 90 capsule 3   • pravastatin (PRAVACHOL) 80 MG tablet Take 1 tablet by mouth Daily. 90 tablet 3   • tamsulosin (FLOMAX) 0.4 MG capsule 24 hr capsule Take 1 capsule by mouth Every Night. 90 capsule 3   • vitamin B-12 (CYANOCOBALAMIN) 1000 MCG tablet Take 1,000 mcg by mouth daily.     • vitamin C (ASCORBIC ACID) 500 MG tablet Take 1,000 mg by mouth Daily.       No current facility-administered medications on file prior to visit.       Allergies   Allergen Reactions   • Penicillins Rash and Other (See Comments)     \"flu-like\" symptoms   • Crestor [Rosuvastatin Calcium] Other (See Comments) and Myalgia     Arthralgias   • Lipitor [Atorvastatin] Other (See Comments) and Myalgia     Arthralgias   • Zocor [Simvastatin] Other (See Comments) and Myalgia     Arthralgias        Review of Systems   Constitutional: Negative.    HENT: Positive for hearing loss and tinnitus.    Eyes: Negative.    Respiratory: Negative.    Cardiovascular: Negative.    Gastrointestinal: Negative.    Endocrine: Negative.    Genitourinary: Negative.    Musculoskeletal: Positive for arthralgias, back pain and neck stiffness.   Skin: Negative.    Allergic/Immunologic: Negative.    Neurological: Positive for light-headedness " "and headaches.   Hematological: Negative.    Psychiatric/Behavioral: Negative.         The following portions of the patient's history were reviewed and updated as appropriate: allergies, current medications, past family history, past medical history, past social history, past surgical history and problem list.    Physical Exam  Pulse 64, height 172.7 cm (67.99\"), weight 91.8 kg (202 lb 6.1 oz), SpO2 98 %.    Body mass index is 30.78 kg/m².    GENERAL APPEARANCE: awake, alert & oriented x 3, in no acute distress and well developed, well nourished  PSYCH: normal affect  LUNGS:  breathing nonlabored  EYES: sclera anicteric  CARDIOVASCULAR: palpable dorsalis pedis, palpable posterior tibial bilaterally. Capillary refill less than 2 seconds  EXTREMITIES: no clubbing, cyanosis  GAIT:  Antalgic            Right Lower Extremity Exam:   ----------  Hip Exam  ----------  FLEXION CONTRACTURE: None  FLEXION: 110 degrees  INTERNAL ROTATION: 20 degrees at 90 degrees of flexion   EXTERNAL ROTATION: 40 degrees at 90 degrees of flexion    PAIN WITH HIP MOTION: no  ----------  Knee Exam  ----------  ALIGNMENT: neutral, no varus or valgus deformity     RANGE OF MOTION:  Normal (0-120 degrees) with no extensor lag or flexion contracture  LIGAMENTOUS STABILITY:   stable to varus and valgus stress at 0 and 30 degrees without any evidence of laxity     STRENGTH:  5/5 knee flexion, extension. 5/5 ankle dorsiflexion and plantarflexion.     PAIN WITH PALPATION: denies tenderness to palpation about the knee, denies medial or lateral joint line pain  KNEE EFFUSION: no  PAIN WITH KNEE ROM: no  PATELLAR CREPITUS: yes, asymptomatic  SPECIAL EXAM FINDINGS:  Negative patellar compression    REFLEXES:  PATELLAR 2+/4  ACHILLES 2+/4    CLONUS: negative  STRAIGHT LEG TEST:   negative    SENSATION TO LIGHT TOUCH:  DEEP PERONEAL/SUPERFICIAL PERONEAL/SURAL/SAPHENOUS/TIBIAL:   intact    EDEMA:  no  ERYTHEMA:  no  WOUNDS/INCISIONS: none, no overlying skin " problems.      Left Lower Extremity Exam:   ----------  Hip Exam  ----------  FLEXION CONTRACTURE: None  FLEXION: 110 degrees  INTERNAL ROTATION: 20 degrees at 90 degrees of flexion   EXTERNAL ROTATION: 40 degrees at 90 degrees of flexion    PAIN WITH HIP MOTION: no  ----------  Knee Exam  ----------  ALIGNMENT: 2 degrees varus, correctible to neutral    RANGE OF MOTION:  Decreased (3 - 110 degrees) with no extensor lag  LIGAMENTOUS STABILITY:   stable to varus and valgus stress at terminal extension and 30 degrees; slight retensioning of the MCL is appreciated with valgus stress at 30 degrees consistent with medial compartment degeneration     STRENGTH:  4/5 knee flexion, extension. 5/5 ankle dorsiflexion and plantarflexion.     PAIN WITH PALPATION: medial joint line and anterior knee  KNEE EFFUSION: yes, mild effusion  PAIN WITH KNEE ROM: yes, global but primarily along medial joint line   PATELLAR CREPITUS: yes, painful and symptomatic  SPECIAL EXAM FINDINGS:  none    REFLEXES:  PATELLAR 2+/4  ACHILLES 2+/4    CLONUS: no  STRAIGHT LEG TEST:   negative    SENSATION TO LIGHT TOUCH:  DEEP PERONEAL/SUPERFICIAL PERONEAL/SURAL/SAPHENOUS/TIBIAL:   intact    EDEMA:  no  ERYTHEMA:  no  WOUNDS/INCISIONS: no      ______________________________________________________________________  ______________________________________________________________________    RADIOGRAPHIC FINDINGS:   Radiographs from 10/12/18 were personally reviewed.  Radiographs demonstrate severe tricompartmental osteoarthritis with genu varum alignment and bone-on-bone articulation medial compartment.  No acute bony injury or fracture.    Assessment/Plan:   Diagnosis Plan   1. Primary osteoarthritis of left knee  Large Joint Arthrocentesis    bupivacaine (MARCAINE) 0.25 % injection 3 mL    lidocaine (XYLOCAINE) 1 % injection 3 mL    triamcinolone acetonide (KENALOG-40) injection 80 mg    diclofenac (VOLTAREN) 1 % gel gel     Patient has severe end-stage  arthritis of the left knee.  He is not interested in surgical intervention.  I discussed conservative treatment with topical anti-inflammatory and cortisone injection.  He is agreeable to this.  He is unable take oral anti-inflammatories secondary to being on Eliquis.  Follow-up in 3 months.    Procedure Note:  I discussed with the patient the potential benefits of performing a therapeutic injection of the left knee as well as potential risks including but not limited to infection, swelling, pain, bleeding, bruising, nerve/vessel damage, skin color changes, transient elevation in blood glucose levels, and fat atrophy. After informed consent and after the area was prepped with alcohol, ethyl chloride was used to numb the skin. Via the superolateral approach, 3cc of 1% lidocaine, 3cc of 0.25% marcaine and 2 cc of 40mg/ml of Kenalog were injected into the left knee. The patient tolerated the procedure well. There were no complications. A sterile dressing was placed over the injection site.      Delfino Solorio MD  10/18/18  12:07 PM

## 2018-10-18 NOTE — PROGRESS NOTES
Procedure   Large Joint Arthrocentesis  Date/Time: 10/18/2018 11:56 AM  Consent given by: patient  Site marked: site marked  Timeout: Immediately prior to procedure a time out was called to verify the correct patient, procedure, equipment, support staff and site/side marked as required   Supporting Documentation  Indications: pain   Procedure Details  Location: knee - L knee  Preparation: Patient was prepped and draped in the usual sterile fashion  Needle size: 22 G  Approach: anterolateral  Medications administered: 3 mL lidocaine 1 %; 80 mg triamcinolone acetonide 40 MG/ML; 3 mL bupivacaine 0.25 %  Patient tolerance: patient tolerated the procedure well with no immediate complications

## 2018-10-25 LAB — BNP SERPL-MCNC: 293 PG/ML (ref 0–100)

## 2018-12-20 ENCOUNTER — OFFICE VISIT (OUTPATIENT)
Dept: FAMILY MEDICINE CLINIC | Facility: CLINIC | Age: 73
End: 2018-12-20

## 2018-12-20 VITALS
HEART RATE: 53 BPM | OXYGEN SATURATION: 98 % | BODY MASS INDEX: 30.25 KG/M2 | HEIGHT: 68 IN | WEIGHT: 199.6 LBS | DIASTOLIC BLOOD PRESSURE: 86 MMHG | SYSTOLIC BLOOD PRESSURE: 138 MMHG

## 2018-12-20 DIAGNOSIS — M25.561 CHRONIC PAIN OF RIGHT KNEE: ICD-10-CM

## 2018-12-20 DIAGNOSIS — I10 ESSENTIAL HYPERTENSION: ICD-10-CM

## 2018-12-20 DIAGNOSIS — G89.4 CHRONIC PAIN SYNDROME: ICD-10-CM

## 2018-12-20 DIAGNOSIS — F32.A DEPRESSION, UNSPECIFIED DEPRESSION TYPE: ICD-10-CM

## 2018-12-20 DIAGNOSIS — N18.1 TYPE 2 DIABETES MELLITUS WITH STAGE 1 CHRONIC KIDNEY DISEASE, WITHOUT LONG-TERM CURRENT USE OF INSULIN (HCC): Primary | ICD-10-CM

## 2018-12-20 DIAGNOSIS — G89.29 CHRONIC PAIN OF RIGHT KNEE: ICD-10-CM

## 2018-12-20 DIAGNOSIS — E11.22 TYPE 2 DIABETES MELLITUS WITH STAGE 1 CHRONIC KIDNEY DISEASE, WITHOUT LONG-TERM CURRENT USE OF INSULIN (HCC): Primary | ICD-10-CM

## 2018-12-20 PROCEDURE — 99214 OFFICE O/P EST MOD 30 MIN: CPT | Performed by: PHYSICIAN ASSISTANT

## 2018-12-20 RX ORDER — LISINOPRIL 10 MG/1
5 TABLET ORAL DAILY
Qty: 90 TABLET | Refills: 3 | Status: SHIPPED | OUTPATIENT
Start: 2018-12-20 | End: 2019-03-08 | Stop reason: ALTCHOICE

## 2018-12-20 NOTE — PATIENT INSTRUCTIONS
-bring in prescription medications to next appointment  -start 0.5 tablet of lisinopril 10 mg daily
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
## 2018-12-27 ENCOUNTER — TELEPHONE (OUTPATIENT)
Dept: FAMILY MEDICINE CLINIC | Facility: CLINIC | Age: 73
End: 2018-12-27

## 2018-12-27 RX ORDER — NITROGLYCERIN 0.4 MG/1
0.4 TABLET SUBLINGUAL AS NEEDED
Qty: 10 TABLET | Refills: 11 | Status: SHIPPED | OUTPATIENT
Start: 2018-12-27

## 2018-12-27 NOTE — TELEPHONE ENCOUNTER
PER PT CALLED STATED HE HAS NITROGLYCERIN TABLETS, BUT THEY ARE , WOULD LIKE TO KNOW IF TODD CAN CALL THIS INTO HIS PHARMACY.

## 2019-01-01 ENCOUNTER — TELEPHONE (OUTPATIENT)
Dept: CARDIOLOGY | Facility: CLINIC | Age: 74
End: 2019-01-01

## 2019-01-01 ENCOUNTER — TELEPHONE (OUTPATIENT)
Dept: FAMILY MEDICINE CLINIC | Facility: CLINIC | Age: 74
End: 2019-01-01

## 2019-01-01 ENCOUNTER — READMISSION MANAGEMENT (OUTPATIENT)
Dept: CALL CENTER | Facility: HOSPITAL | Age: 74
End: 2019-01-01

## 2019-01-01 ENCOUNTER — OFFICE VISIT (OUTPATIENT)
Dept: FAMILY MEDICINE CLINIC | Facility: CLINIC | Age: 74
End: 2019-01-01

## 2019-01-01 ENCOUNTER — ANESTHESIA (OUTPATIENT)
Dept: GASTROENTEROLOGY | Facility: HOSPITAL | Age: 74
End: 2019-01-01

## 2019-01-01 ENCOUNTER — LAB REQUISITION (OUTPATIENT)
Dept: LAB | Facility: HOSPITAL | Age: 74
End: 2019-01-01

## 2019-01-01 ENCOUNTER — HOSPITAL ENCOUNTER (OUTPATIENT)
Dept: ULTRASOUND IMAGING | Facility: HOSPITAL | Age: 74
Discharge: HOME | End: 2019-09-10
Admitting: PHYSICIAN ASSISTANT

## 2019-01-01 ENCOUNTER — APPOINTMENT (OUTPATIENT)
Dept: ULTRASOUND IMAGING | Facility: HOSPITAL | Age: 74
End: 2019-01-01

## 2019-01-01 ENCOUNTER — APPOINTMENT (OUTPATIENT)
Dept: CARDIOLOGY | Facility: HOSPITAL | Age: 74
End: 2019-01-01

## 2019-01-01 ENCOUNTER — INFUSION (OUTPATIENT)
Dept: ONCOLOGY | Facility: HOSPITAL | Age: 74
End: 2019-01-01

## 2019-01-01 ENCOUNTER — TRANSITIONAL CARE MANAGEMENT TELEPHONE ENCOUNTER (OUTPATIENT)
Dept: INTERNAL MEDICINE | Facility: CLINIC | Age: 74
End: 2019-01-01

## 2019-01-01 ENCOUNTER — DOCUMENTATION (OUTPATIENT)
Dept: NUTRITION | Facility: HOSPITAL | Age: 74
End: 2019-01-01

## 2019-01-01 ENCOUNTER — APPOINTMENT (OUTPATIENT)
Dept: GENERAL RADIOLOGY | Facility: HOSPITAL | Age: 74
End: 2019-01-01

## 2019-01-01 ENCOUNTER — OFFICE VISIT (OUTPATIENT)
Dept: CARDIOLOGY | Facility: CLINIC | Age: 74
End: 2019-01-01

## 2019-01-01 ENCOUNTER — HOSPITAL ENCOUNTER (INPATIENT)
Facility: HOSPITAL | Age: 74
LOS: 3 days | Discharge: HOME | End: 2019-10-22
Attending: EMERGENCY MEDICINE | Admitting: INTERNAL MEDICINE

## 2019-01-01 ENCOUNTER — ANESTHESIA EVENT (OUTPATIENT)
Dept: GASTROENTEROLOGY | Facility: HOSPITAL | Age: 74
End: 2019-01-01

## 2019-01-01 VITALS
OXYGEN SATURATION: 94 % | SYSTOLIC BLOOD PRESSURE: 136 MMHG | BODY MASS INDEX: 30.72 KG/M2 | DIASTOLIC BLOOD PRESSURE: 78 MMHG | HEIGHT: 69 IN | HEART RATE: 77 BPM | WEIGHT: 207.4 LBS

## 2019-01-01 VITALS
DIASTOLIC BLOOD PRESSURE: 84 MMHG | HEIGHT: 69 IN | BODY MASS INDEX: 30.21 KG/M2 | HEART RATE: 77 BPM | OXYGEN SATURATION: 96 % | WEIGHT: 204 LBS | SYSTOLIC BLOOD PRESSURE: 138 MMHG

## 2019-01-01 VITALS
WEIGHT: 201.2 LBS | HEIGHT: 69 IN | SYSTOLIC BLOOD PRESSURE: 138 MMHG | BODY MASS INDEX: 29.8 KG/M2 | HEART RATE: 49 BPM | DIASTOLIC BLOOD PRESSURE: 72 MMHG | OXYGEN SATURATION: 95 %

## 2019-01-01 VITALS
RESPIRATION RATE: 16 BRPM | TEMPERATURE: 98.7 F | HEIGHT: 69 IN | WEIGHT: 207.01 LBS | HEART RATE: 64 BPM | OXYGEN SATURATION: 92 % | DIASTOLIC BLOOD PRESSURE: 69 MMHG | SYSTOLIC BLOOD PRESSURE: 143 MMHG | BODY MASS INDEX: 30.66 KG/M2

## 2019-01-01 VITALS
BODY MASS INDEX: 27.19 KG/M2 | DIASTOLIC BLOOD PRESSURE: 68 MMHG | HEART RATE: 70 BPM | WEIGHT: 183.6 LBS | HEIGHT: 69 IN | OXYGEN SATURATION: 98 % | SYSTOLIC BLOOD PRESSURE: 108 MMHG

## 2019-01-01 VITALS
TEMPERATURE: 98.2 F | RESPIRATION RATE: 16 BRPM | DIASTOLIC BLOOD PRESSURE: 66 MMHG | HEART RATE: 78 BPM | SYSTOLIC BLOOD PRESSURE: 108 MMHG

## 2019-01-01 VITALS
HEART RATE: 71 BPM | BODY MASS INDEX: 30.21 KG/M2 | DIASTOLIC BLOOD PRESSURE: 80 MMHG | SYSTOLIC BLOOD PRESSURE: 134 MMHG | OXYGEN SATURATION: 97 % | WEIGHT: 204 LBS | HEIGHT: 69 IN

## 2019-01-01 VITALS
HEIGHT: 69 IN | BODY MASS INDEX: 29.47 KG/M2 | SYSTOLIC BLOOD PRESSURE: 63 MMHG | WEIGHT: 199 LBS | DIASTOLIC BLOOD PRESSURE: 40 MMHG | HEART RATE: 52 BPM

## 2019-01-01 VITALS
SYSTOLIC BLOOD PRESSURE: 121 MMHG | HEART RATE: 74 BPM | TEMPERATURE: 96.8 F | RESPIRATION RATE: 16 BRPM | DIASTOLIC BLOOD PRESSURE: 69 MMHG

## 2019-01-01 DIAGNOSIS — C32.9 MALIGNANT NEOPLASM OF LARYNX (HCC): ICD-10-CM

## 2019-01-01 DIAGNOSIS — I25.810 ATHEROSCLEROSIS OF CORONARY ARTERY BYPASS GRAFT OF NATIVE HEART WITHOUT ANGINA PECTORIS: ICD-10-CM

## 2019-01-01 DIAGNOSIS — Z00.00 PHYSICAL EXAM, ANNUAL: ICD-10-CM

## 2019-01-01 DIAGNOSIS — G89.29 CHRONIC PAIN OF LEFT KNEE: ICD-10-CM

## 2019-01-01 DIAGNOSIS — I10 ESSENTIAL HYPERTENSION: Primary | ICD-10-CM

## 2019-01-01 DIAGNOSIS — K21.9 GASTROESOPHAGEAL REFLUX DISEASE WITHOUT ESOPHAGITIS: ICD-10-CM

## 2019-01-01 DIAGNOSIS — R13.10 DYSPHAGIA, UNSPECIFIED TYPE: ICD-10-CM

## 2019-01-01 DIAGNOSIS — I48.0 PAF (PAROXYSMAL ATRIAL FIBRILLATION) (HCC): ICD-10-CM

## 2019-01-01 DIAGNOSIS — N40.1 BPH WITH OBSTRUCTION/LOWER URINARY TRACT SYMPTOMS: ICD-10-CM

## 2019-01-01 DIAGNOSIS — I21.4 NON-ST ELEVATION MI (NSTEMI) (HCC): Primary | ICD-10-CM

## 2019-01-01 DIAGNOSIS — D50.0 CHRONIC BLOOD LOSS ANEMIA: ICD-10-CM

## 2019-01-01 DIAGNOSIS — D50.0 BLOOD LOSS ANEMIA: ICD-10-CM

## 2019-01-01 DIAGNOSIS — I48.0 PAROXYSMAL ATRIAL FIBRILLATION (HCC): ICD-10-CM

## 2019-01-01 DIAGNOSIS — E55.9 VITAMIN D DEFICIENCY: ICD-10-CM

## 2019-01-01 DIAGNOSIS — D64.9 ANEMIA, UNSPECIFIED TYPE: Primary | ICD-10-CM

## 2019-01-01 DIAGNOSIS — G89.29 CHRONIC MIDLINE LOW BACK PAIN WITHOUT SCIATICA: ICD-10-CM

## 2019-01-01 DIAGNOSIS — N18.30 CHRONIC KIDNEY DISEASE, STAGE III (MODERATE) (HCC): ICD-10-CM

## 2019-01-01 DIAGNOSIS — M1A.09X0 CHRONIC GOUT OF MULTIPLE SITES, UNSPECIFIED CAUSE: ICD-10-CM

## 2019-01-01 DIAGNOSIS — I21.4 NSTEMI (NON-ST ELEVATED MYOCARDIAL INFARCTION) (HCC): ICD-10-CM

## 2019-01-01 DIAGNOSIS — E87.6 HYPOKALEMIA: Primary | ICD-10-CM

## 2019-01-01 DIAGNOSIS — K92.2 GASTROINTESTINAL HEMORRHAGE, UNSPECIFIED GASTROINTESTINAL HEMORRHAGE TYPE: ICD-10-CM

## 2019-01-01 DIAGNOSIS — I10 ESSENTIAL HYPERTENSION: ICD-10-CM

## 2019-01-01 DIAGNOSIS — G89.4 CHRONIC PAIN SYNDROME: ICD-10-CM

## 2019-01-01 DIAGNOSIS — E03.9 HYPOTHYROIDISM, UNSPECIFIED TYPE: ICD-10-CM

## 2019-01-01 DIAGNOSIS — N50.89 TESTICULAR SWELLING, RIGHT: ICD-10-CM

## 2019-01-01 DIAGNOSIS — Z00.00 ROUTINE GENERAL MEDICAL EXAMINATION AT A HEALTH CARE FACILITY: ICD-10-CM

## 2019-01-01 DIAGNOSIS — I50.43 ACUTE ON CHRONIC COMBINED SYSTOLIC AND DIASTOLIC CONGESTIVE HEART FAILURE (HCC): ICD-10-CM

## 2019-01-01 DIAGNOSIS — R49.0 HOARSENESS: ICD-10-CM

## 2019-01-01 DIAGNOSIS — M54.50 CHRONIC MIDLINE LOW BACK PAIN WITHOUT SCIATICA: ICD-10-CM

## 2019-01-01 DIAGNOSIS — E78.5 HYPERLIPIDEMIA, UNSPECIFIED HYPERLIPIDEMIA TYPE: ICD-10-CM

## 2019-01-01 DIAGNOSIS — E87.6 HYPOKALEMIA: ICD-10-CM

## 2019-01-01 DIAGNOSIS — E11.22 TYPE 2 DIABETES MELLITUS WITH STAGE 1 CHRONIC KIDNEY DISEASE, WITHOUT LONG-TERM CURRENT USE OF INSULIN (HCC): Primary | ICD-10-CM

## 2019-01-01 DIAGNOSIS — M25.562 CHRONIC PAIN OF LEFT KNEE: ICD-10-CM

## 2019-01-01 DIAGNOSIS — F32.A DEPRESSION, UNSPECIFIED DEPRESSION TYPE: ICD-10-CM

## 2019-01-01 DIAGNOSIS — E03.9 HYPOTHYROIDISM (ACQUIRED): Primary | ICD-10-CM

## 2019-01-01 DIAGNOSIS — Z11.59 ENCOUNTER FOR HEPATITIS C SCREENING TEST FOR LOW RISK PATIENT: ICD-10-CM

## 2019-01-01 DIAGNOSIS — N13.8 BPH WITH OBSTRUCTION/LOWER URINARY TRACT SYMPTOMS: ICD-10-CM

## 2019-01-01 DIAGNOSIS — Z00.00 MEDICARE ANNUAL WELLNESS VISIT, SUBSEQUENT: ICD-10-CM

## 2019-01-01 DIAGNOSIS — Z23 NEED FOR IMMUNIZATION AGAINST INFLUENZA: ICD-10-CM

## 2019-01-01 DIAGNOSIS — D50.9 IRON DEFICIENCY ANEMIA, UNSPECIFIED IRON DEFICIENCY ANEMIA TYPE: ICD-10-CM

## 2019-01-01 DIAGNOSIS — R07.9 CHEST PAIN, UNSPECIFIED TYPE: ICD-10-CM

## 2019-01-01 DIAGNOSIS — E78.5 HYPERLIPIDEMIA LDL GOAL <70: Primary | ICD-10-CM

## 2019-01-01 DIAGNOSIS — R82.90 FOUL SMELLING URINE: ICD-10-CM

## 2019-01-01 DIAGNOSIS — D64.9 ANEMIA, UNSPECIFIED TYPE: ICD-10-CM

## 2019-01-01 DIAGNOSIS — D50.9 IRON DEFICIENCY ANEMIA, UNSPECIFIED IRON DEFICIENCY ANEMIA TYPE: Primary | ICD-10-CM

## 2019-01-01 DIAGNOSIS — Z12.5 PROSTATE CANCER SCREENING: ICD-10-CM

## 2019-01-01 DIAGNOSIS — N18.1 TYPE 2 DIABETES MELLITUS WITH STAGE 1 CHRONIC KIDNEY DISEASE, WITHOUT LONG-TERM CURRENT USE OF INSULIN (HCC): Primary | ICD-10-CM

## 2019-01-01 DIAGNOSIS — I50.42 CHRONIC COMBINED SYSTOLIC AND DIASTOLIC CONGESTIVE HEART FAILURE (HCC): Primary | ICD-10-CM

## 2019-01-01 DIAGNOSIS — R53.83 FATIGUE, UNSPECIFIED TYPE: ICD-10-CM

## 2019-01-01 DIAGNOSIS — E78.5 HYPERLIPIDEMIA LDL GOAL <70: ICD-10-CM

## 2019-01-01 DIAGNOSIS — I50.42 CHRONIC COMBINED SYSTOLIC AND DIASTOLIC CONGESTIVE HEART FAILURE (HCC): ICD-10-CM

## 2019-01-01 DIAGNOSIS — N17.9 ACUTE RENAL FAILURE, UNSPECIFIED ACUTE RENAL FAILURE TYPE (HCC): ICD-10-CM

## 2019-01-01 DIAGNOSIS — D50.0 ANEMIA DUE TO GI BLOOD LOSS: Primary | ICD-10-CM

## 2019-01-01 DIAGNOSIS — I25.9 ISCHEMIC HEART DISEASE: ICD-10-CM

## 2019-01-01 DIAGNOSIS — J44.9 CHRONIC OBSTRUCTIVE PULMONARY DISEASE, UNSPECIFIED COPD TYPE (HCC): ICD-10-CM

## 2019-01-01 DIAGNOSIS — R31.9 HEMATURIA, UNSPECIFIED TYPE: ICD-10-CM

## 2019-01-01 DIAGNOSIS — I50.9 ACUTE HEART FAILURE, UNSPECIFIED HEART FAILURE TYPE (HCC): ICD-10-CM

## 2019-01-01 DIAGNOSIS — I95.9 HYPOTENSION, UNSPECIFIED HYPOTENSION TYPE: ICD-10-CM

## 2019-01-01 DIAGNOSIS — I20.0 UNSTABLE ANGINA PECTORIS (HCC): Primary | ICD-10-CM

## 2019-01-01 DIAGNOSIS — G44.89 HEADACHE SYNDROME: ICD-10-CM

## 2019-01-01 DIAGNOSIS — D50.0 ANEMIA DUE TO GI BLOOD LOSS: ICD-10-CM

## 2019-01-01 LAB
ABO + RH BLD: NORMAL
ABO GROUP BLD: NORMAL
ABO GROUP BLD: NORMAL
ALBUMIN SERPL-MCNC: 2.6 G/DL (ref 3.5–5.2)
ALBUMIN SERPL-MCNC: 2.9 G/DL (ref 3.5–5.2)
ALBUMIN SERPL-MCNC: 3 G/DL (ref 3.5–5.2)
ALBUMIN SERPL-MCNC: 3.6 G/DL (ref 3.5–5.2)
ALBUMIN SERPL-MCNC: 3.9 G/DL (ref 3.5–5.2)
ALBUMIN SERPL-MCNC: 3.9 G/DL (ref 3.5–5.2)
ALBUMIN SERPL-MCNC: 4 G/DL (ref 3.5–5.2)
ALBUMIN/GLOB SERPL: 1.1 G/DL
ALBUMIN/GLOB SERPL: 1.2 G/DL
ALBUMIN/GLOB SERPL: 1.2 G/DL
ALBUMIN/GLOB SERPL: 1.3 G/DL
ALBUMIN/GLOB SERPL: 1.4 G/DL
ALP SERPL-CCNC: 173 U/L (ref 39–117)
ALP SERPL-CCNC: 268 U/L (ref 39–117)
ALP SERPL-CCNC: 328 U/L (ref 39–117)
ALP SERPL-CCNC: 70 U/L (ref 39–117)
ALP SERPL-CCNC: 70 U/L (ref 39–117)
ALT SERPL W P-5'-P-CCNC: 11 U/L (ref 1–41)
ALT SERPL W P-5'-P-CCNC: 17 U/L (ref 1–41)
ALT SERPL W P-5'-P-CCNC: 26 U/L (ref 1–41)
ALT SERPL W P-5'-P-CCNC: 8 U/L (ref 1–41)
ALT SERPL-CCNC: 8 U/L (ref 1–41)
AMMONIA BLD-SCNC: 35 UMOL/L (ref 16–60)
AMPHET+METHAMPHET UR QL: NEGATIVE
AMPHETAMINES UR QL: NEGATIVE
ANION GAP SERPL CALCULATED.3IONS-SCNC: 11 MMOL/L (ref 5–15)
ANION GAP SERPL CALCULATED.3IONS-SCNC: 11 MMOL/L (ref 5–15)
ANION GAP SERPL CALCULATED.3IONS-SCNC: 12 MMOL/L (ref 5–15)
ANION GAP SERPL CALCULATED.3IONS-SCNC: 15 MMOL/L
ANION GAP SERPL CALCULATED.3IONS-SCNC: 15 MMOL/L (ref 5–15)
APPEARANCE UR: CLEAR
AST SERPL-CCNC: 21 U/L (ref 1–40)
AST SERPL-CCNC: 21 U/L (ref 1–40)
AST SERPL-CCNC: 41 U/L (ref 1–40)
AST SERPL-CCNC: 44 U/L (ref 1–40)
AST SERPL-CCNC: 69 U/L (ref 1–40)
BACTERIA #/AREA URNS HPF: NORMAL /[HPF]
BACTERIA SPEC AEROBE CULT: NORMAL
BACTERIA UR CULT: NORMAL
BACTERIA UR CULT: NORMAL
BACTERIA UR QL AUTO: ABNORMAL /HPF
BARBITURATES UR QL SCN: NEGATIVE
BASOPHILS # BLD AUTO: 0.03 10*3/MM3 (ref 0–0.2)
BASOPHILS # BLD AUTO: 0.05 10*3/MM3 (ref 0–0.2)
BASOPHILS # BLD AUTO: 0.06 10*3/MM3 (ref 0–0.2)
BASOPHILS NFR BLD AUTO: 0.8 % (ref 0–1.5)
BASOPHILS NFR BLD AUTO: 1 % (ref 0–1.5)
BASOPHILS NFR BLD AUTO: 1 % (ref 0–1.5)
BENZODIAZ UR QL SCN: NEGATIVE
BH BB BLOOD EXPIRATION DATE: NORMAL
BH BB BLOOD TYPE BARCODE: 6200
BH BB DISPENSE STATUS: NORMAL
BH BB PRODUCT CODE: NORMAL
BH BB UNIT NUMBER: NORMAL
BH CV ECHO MEAS - AO MAX PG (FULL): 9.8 MMHG
BH CV ECHO MEAS - AO MAX PG: 13 MMHG
BH CV ECHO MEAS - AO MEAN PG (FULL): 5.5 MMHG
BH CV ECHO MEAS - AO MEAN PG: 7 MMHG
BH CV ECHO MEAS - AO ROOT AREA (BSA CORRECTED): 1.4
BH CV ECHO MEAS - AO ROOT AREA: 6.3 CM^2
BH CV ECHO MEAS - AO ROOT DIAM: 2.8 CM
BH CV ECHO MEAS - AO V2 MAX: 180.5 CM/SEC
BH CV ECHO MEAS - AO V2 MEAN: 122 CM/SEC
BH CV ECHO MEAS - AO V2 VTI: 44.1 CM
BH CV ECHO MEAS - AVA(I,A): 1.4 CM^2
BH CV ECHO MEAS - AVA(I,D): 1.4 CM^2
BH CV ECHO MEAS - AVA(V,A): 1.6 CM^2
BH CV ECHO MEAS - AVA(V,D): 1.6 CM^2
BH CV ECHO MEAS - BSA(HAYCOCK): 2.2 M^2
BH CV ECHO MEAS - BSA: 2.1 M^2
BH CV ECHO MEAS - BZI_BMI: 30.6 KILOGRAMS/M^2
BH CV ECHO MEAS - BZI_METRIC_HEIGHT: 175.3 CM
BH CV ECHO MEAS - BZI_METRIC_WEIGHT: 93.9 KG
BH CV ECHO MEAS - EDV(CUBED): 180.6 ML
BH CV ECHO MEAS - EDV(TEICH): 157 ML
BH CV ECHO MEAS - EF(CUBED): 56.7 %
BH CV ECHO MEAS - EF(TEICH): 47.8 %
BH CV ECHO MEAS - ESV(CUBED): 78.1 ML
BH CV ECHO MEAS - ESV(TEICH): 81.9 ML
BH CV ECHO MEAS - FS: 24.4 %
BH CV ECHO MEAS - IVS/LVPW: 0.99
BH CV ECHO MEAS - IVSD: 1.2 CM
BH CV ECHO MEAS - LA DIMENSION: 4.9 CM
BH CV ECHO MEAS - LA/AO: 1.7
BH CV ECHO MEAS - LAD MAJOR: 7 CM
BH CV ECHO MEAS - LAT PEAK E' VEL: 8.4 CM/SEC
BH CV ECHO MEAS - LATERAL E/E' RATIO: 12.6
BH CV ECHO MEAS - LV MASS(C)D: 276.7 GRAMS
BH CV ECHO MEAS - LV MASS(C)DI: 132 GRAMS/M^2
BH CV ECHO MEAS - LV MAX PG: 3.3 MMHG
BH CV ECHO MEAS - LV MEAN PG: 1.5 MMHG
BH CV ECHO MEAS - LV V1 MAX: 90.3 CM/SEC
BH CV ECHO MEAS - LV V1 MEAN: 54.5 CM/SEC
BH CV ECHO MEAS - LV V1 VTI: 20.1 CM
BH CV ECHO MEAS - LVIDD: 5.7 CM
BH CV ECHO MEAS - LVIDS: 4.3 CM
BH CV ECHO MEAS - LVOT AREA (M): 3.1 CM^2
BH CV ECHO MEAS - LVOT AREA: 3.2 CM^2
BH CV ECHO MEAS - LVOT DIAM: 2 CM
BH CV ECHO MEAS - LVPWD: 1.2 CM
BH CV ECHO MEAS - MED PEAK E' VEL: 5.7 CM/SEC
BH CV ECHO MEAS - MEDIAL E/E' RATIO: 18.6
BH CV ECHO MEAS - MV A MAX VEL: 38.5 CM/SEC
BH CV ECHO MEAS - MV DEC SLOPE: 380.6 CM/SEC^2
BH CV ECHO MEAS - MV DEC TIME: 0.27 SEC
BH CV ECHO MEAS - MV E MAX VEL: 108.6 CM/SEC
BH CV ECHO MEAS - MV E/A: 2.8
BH CV ECHO MEAS - MV P1/2T MAX VEL: 116.7 CM/SEC
BH CV ECHO MEAS - MV P1/2T: 89.8 MSEC
BH CV ECHO MEAS - MVA P1/2T LCG: 1.9 CM^2
BH CV ECHO MEAS - MVA(P1/2T): 2.4 CM^2
BH CV ECHO MEAS - PA ACC SLOPE: 400.5 CM/SEC^2
BH CV ECHO MEAS - PA ACC TIME: 0.15 SEC
BH CV ECHO MEAS - PA PR(ACCEL): 12.5 MMHG
BH CV ECHO MEAS - RAP SYSTOLE: 3 MMHG
BH CV ECHO MEAS - RV MAX PG: 2 MMHG
BH CV ECHO MEAS - RV V1 MAX: 70.6 CM/SEC
BH CV ECHO MEAS - RVSP: 7 MMHG
BH CV ECHO MEAS - SI(AO): 132.4 ML/M^2
BH CV ECHO MEAS - SI(CUBED): 48.9 ML/M^2
BH CV ECHO MEAS - SI(LVOT): 30.3 ML/M^2
BH CV ECHO MEAS - SI(TEICH): 35.8 ML/M^2
BH CV ECHO MEAS - SV(AO): 277.6 ML
BH CV ECHO MEAS - SV(CUBED): 102.5 ML
BH CV ECHO MEAS - SV(LVOT): 63.6 ML
BH CV ECHO MEAS - SV(TEICH): 75.1 ML
BH CV ECHO MEAS - TR MAX PG: 4 MMHG
BH CV ECHO MEAS - TR MAX VEL: 103.8 CM/SEC
BH CV ECHO MEASUREMENTS AVERAGE E/E' RATIO: 15.4
BH CV XLRA - RV BASE: 4.7 CM
BH CV XLRA - RV LENGTH: 8.4 CM
BH CV XLRA - RV MID: 4.3 CM
BILIRUB BLD-MCNC: NEGATIVE MG/DL
BILIRUB BLD-MCNC: NEGATIVE MG/DL
BILIRUB SERPL-MCNC: 0.4 MG/DL (ref 0.2–1.2)
BILIRUB SERPL-MCNC: 0.4 MG/DL (ref 0.2–1.2)
BILIRUB SERPL-MCNC: 0.7 MG/DL (ref 0.2–1.2)
BILIRUB SERPL-MCNC: 0.8 MG/DL (ref 0.2–1.2)
BILIRUB SERPL-MCNC: 1.1 MG/DL (ref 0.2–1.2)
BILIRUB UR QL STRIP: NEGATIVE
BILIRUB UR QL STRIP: NEGATIVE
BLD GP AB SCN SERPL QL: NEGATIVE
BUN BLD-MCNC: 11 MG/DL (ref 8–23)
BUN BLD-MCNC: 15 MG/DL (ref 8–23)
BUN BLD-MCNC: 20 MG/DL (ref 8–23)
BUN BLD-MCNC: 22 MG/DL (ref 8–23)
BUN BLD-MCNC: 24 MG/DL (ref 8–23)
BUN BLD-MCNC: 27 MG/DL (ref 8–23)
BUN BLD-MCNC: 8 MG/DL (ref 8–23)
BUN SERPL-MCNC: 8 MG/DL (ref 8–23)
BUN/CREAT SERPL: 10.7 (ref 7–25)
BUN/CREAT SERPL: 14 (ref 7–25)
BUN/CREAT SERPL: 7.7 (ref 7–25)
BUN/CREAT SERPL: 8.2 (ref 7–25)
BUN/CREAT SERPL: 8.8 (ref 7–25)
BUPRENORPHINE SERPL-MCNC: NEGATIVE NG/ML
CA-I SERPL ISE-MCNC: 1.22 MMOL/L (ref 1.12–1.32)
CALCIUM SERPL-MCNC: 9.3 MG/DL (ref 8.6–10.5)
CALCIUM SPEC-SCNC: 7.9 MG/DL (ref 8.6–10.5)
CALCIUM SPEC-SCNC: 8 MG/DL (ref 8.6–10.5)
CALCIUM SPEC-SCNC: 8 MG/DL (ref 8.6–10.5)
CALCIUM SPEC-SCNC: 8.5 MG/DL (ref 8.6–10.5)
CALCIUM SPEC-SCNC: 9.3 MG/DL (ref 8.6–10.5)
CALCIUM SPEC-SCNC: 9.8 MG/DL (ref 8.6–10.5)
CALCIUM SPEC-SCNC: 9.8 MG/DL (ref 8.6–10.5)
CANNABINOIDS SERPL QL: NEGATIVE
CHLORIDE SERPL-SCNC: 102 MMOL/L (ref 98–107)
CHLORIDE SERPL-SCNC: 104 MMOL/L (ref 98–107)
CHLORIDE SERPL-SCNC: 106 MMOL/L (ref 98–107)
CHLORIDE SERPL-SCNC: 94 MMOL/L (ref 98–107)
CHLORIDE SERPL-SCNC: 94 MMOL/L (ref 98–107)
CHLORIDE SERPL-SCNC: 95 MMOL/L (ref 98–107)
CHLORIDE SERPL-SCNC: 96 MMOL/L (ref 98–107)
CHLORIDE SERPL-SCNC: 96 MMOL/L (ref 98–107)
CK SERPL-CCNC: 138 U/L (ref 20–200)
CLARITY UR: CLEAR
CLARITY, POC: CLEAR
CLARITY, POC: CLEAR
CO2 SERPL-SCNC: 22 MMOL/L (ref 22–29)
CO2 SERPL-SCNC: 23 MMOL/L (ref 22–29)
CO2 SERPL-SCNC: 24 MMOL/L (ref 22–29)
CO2 SERPL-SCNC: 24 MMOL/L (ref 22–29)
CO2 SERPL-SCNC: 26 MMOL/L (ref 22–29)
CO2 SERPL-SCNC: 27 MMOL/L (ref 22–29)
CO2 SERPL-SCNC: 30 MMOL/L (ref 22–29)
CO2 SERPL-SCNC: 30 MMOL/L (ref 22–29)
COCAINE UR QL: NEGATIVE
COLOR UR: YELLOW
CREAT BLD-MCNC: 1.03 MG/DL (ref 0.76–1.27)
CREAT BLD-MCNC: 1.04 MG/DL (ref 0.76–1.27)
CREAT BLD-MCNC: 1.07 MG/DL (ref 0.76–1.27)
CREAT BLD-MCNC: 2.59 MG/DL (ref 0.76–1.27)
CREAT BLD-MCNC: 2.87 MG/DL (ref 0.76–1.27)
CREAT BLD-MCNC: 2.93 MG/DL (ref 0.76–1.27)
CREAT BLD-MCNC: 3.08 MG/DL (ref 0.76–1.27)
CREAT SERPL-MCNC: 1.04 MG/DL (ref 0.76–1.27)
CREAT UR-MCNC: 26.6 MG/DL
CYTO UR: NORMAL
CYTOLOGIST CVX/VAG CYTO: NORMAL
DEPRECATED RDW RBC AUTO: 53.2 FL (ref 37–54)
DEPRECATED RDW RBC AUTO: 53.5 FL (ref 37–54)
DEPRECATED RDW RBC AUTO: 53.9 FL (ref 37–54)
DEPRECATED RDW RBC AUTO: 54 FL (ref 37–54)
DEPRECATED RDW RBC AUTO: 62.2 FL (ref 37–54)
DEVELOPER EXPIRATION DATE: ABNORMAL
DEVELOPER LOT NUMBER: ABNORMAL
EOSINOPHIL # BLD AUTO: 0.07 10*3/MM3 (ref 0–0.4)
EOSINOPHIL # BLD AUTO: 0.11 10*3/MM3 (ref 0–0.4)
EOSINOPHIL # BLD AUTO: 0.11 10*3/MM3 (ref 0–0.4)
EOSINOPHIL NFR BLD AUTO: 1.8 % (ref 0.3–6.2)
EOSINOPHIL NFR BLD AUTO: 1.8 % (ref 0.3–6.2)
EOSINOPHIL NFR BLD AUTO: 2.2 % (ref 0.3–6.2)
EPI CELLS #/AREA URNS HPF: NORMAL /HPF
ERYTHROCYTE [DISTWIDTH] IN BLOOD BY AUTOMATED COUNT: 17.4 % (ref 12.3–15.4)
ERYTHROCYTE [DISTWIDTH] IN BLOOD BY AUTOMATED COUNT: 17.4 % (ref 12.3–15.4)
ERYTHROCYTE [DISTWIDTH] IN BLOOD BY AUTOMATED COUNT: 17.6 % (ref 12.3–15.4)
ERYTHROCYTE [DISTWIDTH] IN BLOOD BY AUTOMATED COUNT: 17.7 % (ref 12.3–15.4)
ERYTHROCYTE [DISTWIDTH] IN BLOOD BY AUTOMATED COUNT: 19.9 % (ref 12.3–15.4)
ERYTHROCYTE [DISTWIDTH] IN BLOOD BY AUTOMATED COUNT: 20.5 % (ref 12.3–15.4)
ETHANOL BLD-MCNC: <10 MG/DL (ref 0–10)
EXPIRATION DATE: ABNORMAL
FATTY CASTS #/AREA URNS LPF: ABNORMAL /[LPF]
FECAL OCCULT BLOOD SCREEN, POC: POSITIVE
FERRITIN SERPL-MCNC: 38.02 NG/ML (ref 30–400)
FINE GRAN CASTS URNS QL MICRO: ABNORMAL /LPF
FOLATE SERPL-MCNC: >20 NG/ML (ref 4.78–24.2)
GFR SERPL CREATININE-BSD FRML MDRD: 20 ML/MIN/1.73
GFR SERPL CREATININE-BSD FRML MDRD: 21 ML/MIN/1.73
GFR SERPL CREATININE-BSD FRML MDRD: 22 ML/MIN/1.73
GFR SERPL CREATININE-BSD FRML MDRD: 24 ML/MIN/1.73
GFR SERPL CREATININE-BSD FRML MDRD: 68 ML/MIN/1.73
GFR SERPL CREATININE-BSD FRML MDRD: 70 ML/MIN/1.73
GFR SERPL CREATININE-BSD FRML MDRD: 71 ML/MIN/1.73
GLOBULIN SER CALC-MCNC: 3.3 G/DL
GLOBULIN UR ELPH-MCNC: 2.7 GM/DL
GLOBULIN UR ELPH-MCNC: 2.8 GM/DL
GLOBULIN UR ELPH-MCNC: 2.8 GM/DL
GLOBULIN UR ELPH-MCNC: 3.3 GM/DL
GLUCOSE BLD-MCNC: 105 MG/DL (ref 65–99)
GLUCOSE BLD-MCNC: 106 MG/DL (ref 65–99)
GLUCOSE BLD-MCNC: 129 MG/DL (ref 65–99)
GLUCOSE BLD-MCNC: 140 MG/DL (ref 65–99)
GLUCOSE BLD-MCNC: 153 MG/DL (ref 65–99)
GLUCOSE BLD-MCNC: 89 MG/DL (ref 65–99)
GLUCOSE BLD-MCNC: 99 MG/DL (ref 65–99)
GLUCOSE SERPL-MCNC: 140 MG/DL (ref 65–99)
GLUCOSE UR QL: NEGATIVE
GLUCOSE UR STRIP-MCNC: NEGATIVE MG/DL
HAPTOGLOB SERPL-MCNC: 282 MG/DL (ref 30–200)
HBA1C MFR BLD: 5.5 %
HBA1C MFR BLD: 5.7 % (ref 4.8–5.6)
HBA1C MFR BLD: 6.1 %
HCT VFR BLD AUTO: 26.5 % (ref 37.5–51)
HCT VFR BLD AUTO: 27.7 % (ref 37.5–51)
HCT VFR BLD AUTO: 27.9 % (ref 37.5–51)
HCT VFR BLD AUTO: 28 % (ref 37.5–51)
HCT VFR BLD AUTO: 29.2 % (ref 37.5–51)
HCT VFR BLD AUTO: 29.4 % (ref 37.5–51)
HCT VFR BLD AUTO: 29.8 % (ref 37.5–51)
HCT VFR BLD AUTO: 30.4 % (ref 37.5–51)
HCT VFR BLD AUTO: 31.5 % (ref 37.5–51)
HGB BLD-MCNC: 7.8 G/DL (ref 13–17.7)
HGB BLD-MCNC: 8.3 G/DL (ref 13–17.7)
HGB BLD-MCNC: 8.5 G/DL (ref 13–17.7)
HGB BLD-MCNC: 8.5 G/DL (ref 13–17.7)
HGB BLD-MCNC: 8.8 G/DL (ref 13–17.7)
HGB BLD-MCNC: 8.9 G/DL (ref 13–17.7)
HGB BLD-MCNC: 9.8 G/DL (ref 13–17.7)
HGB UR QL STRIP.AUTO: ABNORMAL
HGB UR QL STRIP: (no result)
HOLD SPECIMEN: NORMAL
HOLD SPECIMEN: NORMAL
HYALINE CASTS UR QL AUTO: ABNORMAL /LPF
IMM GRANULOCYTES # BLD AUTO: 0.01 10*3/MM3 (ref 0–0.05)
IMM GRANULOCYTES # BLD AUTO: 0.02 10*3/MM3 (ref 0–0.05)
IMM GRANULOCYTES # BLD AUTO: 0.05 10*3/MM3 (ref 0–0.05)
IMM GRANULOCYTES NFR BLD AUTO: 0.3 % (ref 0–0.5)
IMM GRANULOCYTES NFR BLD AUTO: 0.3 % (ref 0–0.5)
IMM GRANULOCYTES NFR BLD AUTO: 1 % (ref 0–0.5)
IRON 24H UR-MRATE: 29 MCG/DL (ref 59–158)
IRON 24H UR-MRATE: 29 MCG/DL (ref 59–158)
IRON SATN MFR SERPL: 7 % (ref 20–50)
KETONES UR QL STRIP: NEGATIVE
KETONES UR QL STRIP: NEGATIVE
KETONES UR QL: NEGATIVE
KETONES UR QL: NEGATIVE
LAB AP CASE REPORT: NORMAL
LAB AP CLINICAL INFORMATION: NORMAL
LDH SERPL-CCNC: 160 U/L (ref 135–225)
LEFT ATRIUM VOLUME INDEX: 38.2 ML/M^2
LEFT ATRIUM VOLUME: 80 ML
LEUKOCYTE EST, POC: NEGATIVE
LEUKOCYTE EST, POC: NEGATIVE
LEUKOCYTE ESTERASE UR QL STRIP.AUTO: NEGATIVE
LEUKOCYTE ESTERASE UR QL STRIP: NEGATIVE
LYMPHOCYTES # BLD AUTO: 0.59 10*3/MM3 (ref 0.7–3.1)
LYMPHOCYTES # BLD AUTO: 0.8 10*3/MM3 (ref 0.7–3.1)
LYMPHOCYTES # BLD AUTO: 0.96 10*3/MM3 (ref 0.7–3.1)
LYMPHOCYTES NFR BLD AUTO: 15.1 % (ref 19.6–45.3)
LYMPHOCYTES NFR BLD AUTO: 15.5 % (ref 19.6–45.3)
LYMPHOCYTES NFR BLD AUTO: 15.7 % (ref 19.6–45.3)
Lab: ABNORMAL
MAGNESIUM SERPL-MCNC: 2.2 MG/DL (ref 1.6–2.4)
MAGNESIUM SERPL-MCNC: 2.5 MG/DL (ref 1.6–2.4)
MAGNESIUM SERPL-MCNC: 2.6 MG/DL (ref 1.6–2.4)
MAXIMAL PREDICTED HEART RATE: 148 BPM
MCH RBC QN AUTO: 24.6 PG (ref 26.6–33)
MCH RBC QN AUTO: 24.9 PG (ref 26.6–33)
MCH RBC QN AUTO: 24.9 PG (ref 26.6–33)
MCH RBC QN AUTO: 25 PG (ref 26.6–33)
MCH RBC QN AUTO: 25.3 PG (ref 26.6–33)
MCH RBC QN AUTO: 27.5 PG (ref 26.6–33)
MCHC RBC AUTO-ENTMCNC: 28.9 G/DL (ref 31.5–35.7)
MCHC RBC AUTO-ENTMCNC: 29.1 G/DL (ref 31.5–35.7)
MCHC RBC AUTO-ENTMCNC: 29.6 G/DL (ref 31.5–35.7)
MCHC RBC AUTO-ENTMCNC: 29.7 G/DL (ref 31.5–35.7)
MCHC RBC AUTO-ENTMCNC: 30 G/DL (ref 31.5–35.7)
MCHC RBC AUTO-ENTMCNC: 31.1 G/DL (ref 31.5–35.7)
MCV RBC AUTO: 83.2 FL (ref 79–97)
MCV RBC AUTO: 83.8 FL (ref 79–97)
MCV RBC AUTO: 84.3 FL (ref 79–97)
MCV RBC AUTO: 84.4 FL (ref 79–97)
MCV RBC AUTO: 87.4 FL (ref 79–97)
MCV RBC AUTO: 88.5 FL (ref 79–97)
METHADONE UR QL SCN: NEGATIVE
MICRO URNS: (no result)
MONOCYTES # BLD AUTO: 0.57 10*3/MM3 (ref 0.1–0.9)
MONOCYTES # BLD AUTO: 0.59 10*3/MM3 (ref 0.1–0.9)
MONOCYTES # BLD AUTO: 1.02 10*3/MM3 (ref 0.1–0.9)
MONOCYTES NFR BLD AUTO: 11.6 % (ref 5–12)
MONOCYTES NFR BLD AUTO: 14.6 % (ref 5–12)
MONOCYTES NFR BLD AUTO: 16.4 % (ref 5–12)
MUCOUS THREADS URNS QL MICRO: PRESENT
NEGATIVE CONTROL: NEGATIVE
NEUTROPHILS # BLD AUTO: 2.63 10*3/MM3 (ref 1.7–7)
NEUTROPHILS # BLD AUTO: 3.48 10*3/MM3 (ref 1.7–7)
NEUTROPHILS # BLD AUTO: 4.04 10*3/MM3 (ref 1.7–7)
NEUTROPHILS NFR BLD AUTO: 65 % (ref 42.7–76)
NEUTROPHILS NFR BLD AUTO: 67.4 % (ref 42.7–76)
NEUTROPHILS NFR BLD AUTO: 68.5 % (ref 42.7–76)
NITRITE UR QL STRIP: NEGATIVE
NITRITE UR QL STRIP: NEGATIVE
NITRITE UR-MCNC: NEGATIVE MG/ML
NITRITE UR-MCNC: NEGATIVE MG/ML
NRBC BLD AUTO-RTO: 0 /100 WBC (ref 0–0.2)
NT-PROBNP SERPL-MCNC: 3211 PG/ML (ref 5–900)
NT-PROBNP SERPL-MCNC: 3211 PG/ML (ref 5–900)
NT-PROBNP SERPL-MCNC: ABNORMAL PG/ML (ref 5–900)
OPIATES UR QL: POSITIVE
OXYCODONE UR QL SCN: POSITIVE
PATH INTERP BLD-IMP: NORMAL
PATH REPORT.FINAL DX SPEC: NORMAL
PATH REPORT.GROSS SPEC: NORMAL
PCP UR QL SCN: NEGATIVE
PH UR STRIP.AUTO: 6 [PH] (ref 5–8)
PH UR STRIP: 6 [PH] (ref 5–7.5)
PH UR: 6 [PH] (ref 5–8)
PH UR: 6 [PH] (ref 5–8)
PHOSPHATE SERPL-MCNC: 3.3 MG/DL (ref 2.5–4.5)
PHOSPHATE SERPL-MCNC: 4.7 MG/DL (ref 2.5–4.5)
PLATELET # BLD AUTO: 124 10*3/MM3 (ref 140–450)
PLATELET # BLD AUTO: 129 10*3/MM3 (ref 140–450)
PLATELET # BLD AUTO: 152 10*3/MM3 (ref 140–450)
PLATELET # BLD AUTO: 156 10*3/MM3 (ref 140–450)
PLATELET # BLD AUTO: 159 10*3/MM3 (ref 140–450)
PLATELET # BLD AUTO: 170 10*3/MM3 (ref 140–450)
PMV BLD AUTO: 10.1 FL (ref 6–12)
PMV BLD AUTO: 10.5 FL (ref 6–12)
PMV BLD AUTO: 10.7 FL (ref 6–12)
PMV BLD AUTO: 10.8 FL (ref 6–12)
PMV BLD AUTO: 11.1 FL (ref 6–12)
POSITIVE CONTROL: POSITIVE
POTASSIUM BLD-SCNC: 3.1 MMOL/L (ref 3.5–5.2)
POTASSIUM BLD-SCNC: 3.1 MMOL/L (ref 3.5–5.2)
POTASSIUM BLD-SCNC: 3.2 MMOL/L (ref 3.5–5.2)
POTASSIUM BLD-SCNC: 3.3 MMOL/L (ref 3.5–5.2)
POTASSIUM BLD-SCNC: 3.4 MMOL/L (ref 3.5–5.2)
POTASSIUM BLD-SCNC: 3.5 MMOL/L (ref 3.5–5.2)
POTASSIUM BLD-SCNC: 3.5 MMOL/L (ref 3.5–5.2)
POTASSIUM BLD-SCNC: 4.5 MMOL/L (ref 3.5–5.2)
POTASSIUM BLD-SCNC: 4.7 MMOL/L (ref 3.5–5.2)
POTASSIUM SERPL-SCNC: 3.5 MMOL/L (ref 3.5–5.2)
PROPOXYPH UR QL: NEGATIVE
PROT SERPL-MCNC: 5.6 G/DL (ref 6–8.5)
PROT SERPL-MCNC: 6.4 G/DL (ref 6–8.5)
PROT SERPL-MCNC: 6.8 G/DL (ref 6–8.5)
PROT SERPL-MCNC: 7.2 G/DL (ref 6–8.5)
PROT SERPL-MCNC: 7.2 G/DL (ref 6–8.5)
PROT UR QL STRIP: (no result)
PROT UR QL STRIP: ABNORMAL
PROT UR STRIP-MCNC: ABNORMAL MG/DL
PROT UR STRIP-MCNC: ABNORMAL MG/DL
PROT UR-MCNC: 40.3 MG/DL
RBC # BLD AUTO: 3.32 10*6/MM3 (ref 4.14–5.8)
RBC # BLD AUTO: 3.33 10*6/MM3 (ref 4.14–5.8)
RBC # BLD AUTO: 3.33 10*6/MM3 (ref 4.14–5.8)
RBC # BLD AUTO: 3.46 10*6/MM3 (ref 4.14–5.8)
RBC # BLD AUTO: 3.48 10*6/MM3 (ref 4.14–5.8)
RBC # BLD AUTO: 3.56 10*6/MM3 (ref 4.14–5.8)
RBC # UR STRIP: ABNORMAL /UL
RBC # UR STRIP: ABNORMAL /UL
RBC # UR: ABNORMAL /HPF
RBC #/AREA URNS HPF: NORMAL /HPF
REF LAB TEST METHOD: ABNORMAL
RETICS # AUTO: 0.1 10*6/MM3 (ref 0.02–0.13)
RETICS/RBC NFR AUTO: 2.89 % (ref 0.7–1.9)
RH BLD: POSITIVE
RH BLD: POSITIVE
SODIUM BLD-SCNC: 133 MMOL/L (ref 136–145)
SODIUM BLD-SCNC: 134 MMOL/L (ref 136–145)
SODIUM BLD-SCNC: 135 MMOL/L (ref 136–145)
SODIUM BLD-SCNC: 138 MMOL/L (ref 136–145)
SODIUM BLD-SCNC: 138 MMOL/L (ref 136–145)
SODIUM BLD-SCNC: 139 MMOL/L (ref 136–145)
SODIUM BLD-SCNC: 140 MMOL/L (ref 136–145)
SODIUM SERPL-SCNC: 139 MMOL/L (ref 136–145)
SP GR UR STRIP: 1.01 (ref 1–1.03)
SP GR UR: 1.01 (ref 1–1.03)
SQUAMOUS #/AREA URNS HPF: ABNORMAL /HPF
STRESS TARGET HR: 126 BPM
T&S EXPIRATION DATE: NORMAL
TIBC SERPL-MCNC: 422 MCG/DL (ref 298–536)
TRANSFERRIN SERPL-MCNC: 283 MG/DL (ref 200–360)
TRICYCLICS UR QL SCN: NEGATIVE
TROPONIN T SERPL-MCNC: 5.57 NG/ML (ref 0–0.03)
TROPONIN T SERPL-MCNC: 5.79 NG/ML (ref 0–0.03)
TROPONIN T SERPL-MCNC: 6.1 NG/ML (ref 0–0.03)
TROPONIN T SERPL-MCNC: 6.9 NG/ML (ref 0–0.03)
TROPONIN T SERPL-MCNC: <0.01 NG/ML (ref 0–0.03)
TROPONIN T SERPL-MCNC: <0.01 NG/ML (ref 0–0.03)
TSH SERPL DL<=0.05 MIU/L-ACNC: 10.24 UIU/ML (ref 0.27–4.2)
TSH SERPL DL<=0.05 MIU/L-ACNC: 10.75 UIU/ML (ref 0.27–4.2)
UNIT  ABO: NORMAL
UNIT  RH: NORMAL
UROBILINOGEN UR QL STRIP: ABNORMAL
UROBILINOGEN UR QL: ABNORMAL
UROBILINOGEN UR QL: NORMAL
UROBILINOGEN UR STRIP-MCNC: 1 MG/DL (ref 0.2–1)
VIT B12 BLD-MCNC: 669 PG/ML (ref 211–946)
WBC # BLD AUTO: 3.9 10*3/MM3 (ref 3.4–10.8)
WBC #/AREA URNS HPF: NORMAL /HPF
WBC NRBC COR # BLD: 3.78 10*3/MM3 (ref 3.4–10.8)
WBC NRBC COR # BLD: 4.79 10*3/MM3 (ref 3.4–10.8)
WBC NRBC COR # BLD: 5.08 10*3/MM3 (ref 3.4–10.8)
WBC NRBC COR # BLD: 5.72 10*3/MM3 (ref 3.4–10.8)
WBC NRBC COR # BLD: 6.21 10*3/MM3 (ref 3.4–10.8)
WBC UR QL AUTO: ABNORMAL /HPF
WHOLE BLOOD HOLD SPECIMEN: NORMAL
WHOLE BLOOD HOLD SPECIMEN: NORMAL

## 2019-01-01 PROCEDURE — 83036 HEMOGLOBIN GLYCOSYLATED A1C: CPT | Performed by: NURSE PRACTITIONER

## 2019-01-01 PROCEDURE — 99233 SBSQ HOSP IP/OBS HIGH 50: CPT | Performed by: INTERNAL MEDICINE

## 2019-01-01 PROCEDURE — 93000 ELECTROCARDIOGRAM COMPLETE: CPT | Performed by: PHYSICIAN ASSISTANT

## 2019-01-01 PROCEDURE — 84156 ASSAY OF PROTEIN URINE: CPT | Performed by: INTERNAL MEDICINE

## 2019-01-01 PROCEDURE — 83880 ASSAY OF NATRIURETIC PEPTIDE: CPT | Performed by: EMERGENCY MEDICINE

## 2019-01-01 PROCEDURE — 86900 BLOOD TYPING SEROLOGIC ABO: CPT | Performed by: NURSE PRACTITIONER

## 2019-01-01 PROCEDURE — 84132 ASSAY OF SERUM POTASSIUM: CPT | Performed by: INTERNAL MEDICINE

## 2019-01-01 PROCEDURE — 84484 ASSAY OF TROPONIN QUANT: CPT | Performed by: EMERGENCY MEDICINE

## 2019-01-01 PROCEDURE — 80048 BASIC METABOLIC PNL TOTAL CA: CPT | Performed by: INTERNAL MEDICINE

## 2019-01-01 PROCEDURE — 93005 ELECTROCARDIOGRAM TRACING: CPT | Performed by: NURSE PRACTITIONER

## 2019-01-01 PROCEDURE — 76775 US EXAM ABDO BACK WALL LIM: CPT

## 2019-01-01 PROCEDURE — 99214 OFFICE O/P EST MOD 30 MIN: CPT | Performed by: PHYSICIAN ASSISTANT

## 2019-01-01 PROCEDURE — 85027 COMPLETE CBC AUTOMATED: CPT | Performed by: INTERNAL MEDICINE

## 2019-01-01 PROCEDURE — 84484 ASSAY OF TROPONIN QUANT: CPT | Performed by: NURSE PRACTITIONER

## 2019-01-01 PROCEDURE — 80069 RENAL FUNCTION PANEL: CPT | Performed by: INTERNAL MEDICINE

## 2019-01-01 PROCEDURE — 88342 IMHCHEM/IMCYTCHM 1ST ANTB: CPT | Performed by: INTERNAL MEDICINE

## 2019-01-01 PROCEDURE — 85025 COMPLETE CBC W/AUTO DIFF WBC: CPT | Performed by: EMERGENCY MEDICINE

## 2019-01-01 PROCEDURE — 96374 THER/PROPH/DIAG INJ IV PUSH: CPT

## 2019-01-01 PROCEDURE — 93005 ELECTROCARDIOGRAM TRACING: CPT | Performed by: EMERGENCY MEDICINE

## 2019-01-01 PROCEDURE — 85027 COMPLETE CBC AUTOMATED: CPT | Performed by: NURSE PRACTITIONER

## 2019-01-01 PROCEDURE — 86923 COMPATIBILITY TEST ELECTRIC: CPT

## 2019-01-01 PROCEDURE — 83036 HEMOGLOBIN GLYCOSYLATED A1C: CPT | Performed by: PHYSICIAN ASSISTANT

## 2019-01-01 PROCEDURE — 80053 COMPREHEN METABOLIC PANEL: CPT | Performed by: EMERGENCY MEDICINE

## 2019-01-01 PROCEDURE — 80053 COMPREHEN METABOLIC PANEL: CPT | Performed by: INTERNAL MEDICINE

## 2019-01-01 PROCEDURE — 81001 URINALYSIS AUTO W/SCOPE: CPT | Performed by: EMERGENCY MEDICINE

## 2019-01-01 PROCEDURE — 81003 URINALYSIS AUTO W/O SCOPE: CPT | Performed by: PHYSICIAN ASSISTANT

## 2019-01-01 PROCEDURE — 82570 ASSAY OF URINE CREATININE: CPT | Performed by: INTERNAL MEDICINE

## 2019-01-01 PROCEDURE — 36415 COLL VENOUS BLD VENIPUNCTURE: CPT | Performed by: INTERNAL MEDICINE

## 2019-01-01 PROCEDURE — 82607 VITAMIN B-12: CPT | Performed by: INTERNAL MEDICINE

## 2019-01-01 PROCEDURE — 82330 ASSAY OF CALCIUM: CPT | Performed by: INTERNAL MEDICINE

## 2019-01-01 PROCEDURE — 85018 HEMOGLOBIN: CPT | Performed by: NURSE PRACTITIONER

## 2019-01-01 PROCEDURE — 80053 COMPREHEN METABOLIC PANEL: CPT | Performed by: PHYSICIAN ASSISTANT

## 2019-01-01 PROCEDURE — 84484 ASSAY OF TROPONIN QUANT: CPT | Performed by: PHYSICIAN ASSISTANT

## 2019-01-01 PROCEDURE — 88305 TISSUE EXAM BY PATHOLOGIST: CPT | Performed by: INTERNAL MEDICINE

## 2019-01-01 PROCEDURE — 83880 ASSAY OF NATRIURETIC PEPTIDE: CPT | Performed by: PHYSICIAN ASSISTANT

## 2019-01-01 PROCEDURE — 25010000003 LIDOCAINE 1 % SOLUTION: Performed by: NURSE ANESTHETIST, CERTIFIED REGISTERED

## 2019-01-01 PROCEDURE — 99291 CRITICAL CARE FIRST HOUR: CPT | Performed by: INTERNAL MEDICINE

## 2019-01-01 PROCEDURE — 36415 COLL VENOUS BLD VENIPUNCTURE: CPT | Performed by: PHYSICIAN ASSISTANT

## 2019-01-01 PROCEDURE — 25010000002 FERUMOXYTOL 510 MG/17ML SOLUTION 510 MG VIAL: Performed by: INTERNAL MEDICINE

## 2019-01-01 PROCEDURE — 93010 ELECTROCARDIOGRAM REPORT: CPT | Performed by: INTERNAL MEDICINE

## 2019-01-01 PROCEDURE — 86901 BLOOD TYPING SEROLOGIC RH(D): CPT

## 2019-01-01 PROCEDURE — 86850 RBC ANTIBODY SCREEN: CPT | Performed by: NURSE PRACTITIONER

## 2019-01-01 PROCEDURE — 83615 LACTATE (LD) (LDH) ENZYME: CPT | Performed by: PHYSICIAN ASSISTANT

## 2019-01-01 PROCEDURE — 99232 SBSQ HOSP IP/OBS MODERATE 35: CPT | Performed by: INTERNAL MEDICINE

## 2019-01-01 PROCEDURE — 25010000002 PROPOFOL 10 MG/ML EMULSION: Performed by: NURSE ANESTHETIST, CERTIFIED REGISTERED

## 2019-01-01 PROCEDURE — 85025 COMPLETE CBC W/AUTO DIFF WBC: CPT | Performed by: INTERNAL MEDICINE

## 2019-01-01 PROCEDURE — 84466 ASSAY OF TRANSFERRIN: CPT | Performed by: INTERNAL MEDICINE

## 2019-01-01 PROCEDURE — 99239 HOSP IP/OBS DSCHRG MGMT >30: CPT | Performed by: INTERNAL MEDICINE

## 2019-01-01 PROCEDURE — 80307 DRUG TEST PRSMV CHEM ANLYZR: CPT | Performed by: EMERGENCY MEDICINE

## 2019-01-01 PROCEDURE — 99214 OFFICE O/P EST MOD 30 MIN: CPT | Performed by: NURSE PRACTITIONER

## 2019-01-01 PROCEDURE — 85060 BLOOD SMEAR INTERPRETATION: CPT | Performed by: PHYSICIAN ASSISTANT

## 2019-01-01 PROCEDURE — 87040 BLOOD CULTURE FOR BACTERIA: CPT | Performed by: INTERNAL MEDICINE

## 2019-01-01 PROCEDURE — 83735 ASSAY OF MAGNESIUM: CPT | Performed by: NURSE PRACTITIONER

## 2019-01-01 PROCEDURE — 99284 EMERGENCY DEPT VISIT MOD MDM: CPT

## 2019-01-01 PROCEDURE — 93306 TTE W/DOPPLER COMPLETE: CPT | Performed by: INTERNAL MEDICINE

## 2019-01-01 PROCEDURE — 76870 US EXAM SCROTUM: CPT

## 2019-01-01 PROCEDURE — 85045 AUTOMATED RETICULOCYTE COUNT: CPT | Performed by: PHYSICIAN ASSISTANT

## 2019-01-01 PROCEDURE — 82728 ASSAY OF FERRITIN: CPT | Performed by: INTERNAL MEDICINE

## 2019-01-01 PROCEDURE — 86900 BLOOD TYPING SEROLOGIC ABO: CPT

## 2019-01-01 PROCEDURE — 83735 ASSAY OF MAGNESIUM: CPT | Performed by: INTERNAL MEDICINE

## 2019-01-01 PROCEDURE — 99222 1ST HOSP IP/OBS MODERATE 55: CPT | Performed by: INTERNAL MEDICINE

## 2019-01-01 PROCEDURE — 93306 TTE W/DOPPLER COMPLETE: CPT

## 2019-01-01 PROCEDURE — 85014 HEMATOCRIT: CPT | Performed by: NURSE PRACTITIONER

## 2019-01-01 PROCEDURE — 86901 BLOOD TYPING SEROLOGIC RH(D): CPT | Performed by: NURSE PRACTITIONER

## 2019-01-01 PROCEDURE — 71045 X-RAY EXAM CHEST 1 VIEW: CPT

## 2019-01-01 PROCEDURE — 0DB68ZX EXCISION OF STOMACH, VIA NATURAL OR ARTIFICIAL OPENING ENDOSCOPIC, DIAGNOSTIC: ICD-10-PCS | Performed by: INTERNAL MEDICINE

## 2019-01-01 PROCEDURE — 83540 ASSAY OF IRON: CPT | Performed by: INTERNAL MEDICINE

## 2019-01-01 PROCEDURE — 82140 ASSAY OF AMMONIA: CPT | Performed by: EMERGENCY MEDICINE

## 2019-01-01 PROCEDURE — 84443 ASSAY THYROID STIM HORMONE: CPT | Performed by: PHYSICIAN ASSISTANT

## 2019-01-01 PROCEDURE — 82270 OCCULT BLOOD FECES: CPT | Performed by: EMERGENCY MEDICINE

## 2019-01-01 PROCEDURE — 83735 ASSAY OF MAGNESIUM: CPT | Performed by: EMERGENCY MEDICINE

## 2019-01-01 PROCEDURE — 84443 ASSAY THYROID STIM HORMONE: CPT | Performed by: NURSE PRACTITIONER

## 2019-01-01 PROCEDURE — 25010000003 POTASSIUM CHLORIDE 10 MEQ/100ML SOLUTION: Performed by: INTERNAL MEDICINE

## 2019-01-01 PROCEDURE — 82550 ASSAY OF CK (CPK): CPT | Performed by: EMERGENCY MEDICINE

## 2019-01-01 PROCEDURE — 82746 ASSAY OF FOLIC ACID SERUM: CPT | Performed by: INTERNAL MEDICINE

## 2019-01-01 PROCEDURE — 83010 ASSAY OF HAPTOGLOBIN QUANT: CPT | Performed by: PHYSICIAN ASSISTANT

## 2019-01-01 RX ORDER — APIXABAN 5 MG/1
TABLET, FILM COATED ORAL
Qty: 60 TABLET | Refills: 2 | Status: SHIPPED | OUTPATIENT
Start: 2019-01-01 | End: 2019-01-01 | Stop reason: HOSPADM

## 2019-01-01 RX ORDER — POTASSIUM CHLORIDE 20 MEQ/1
20 TABLET, EXTENDED RELEASE ORAL 2 TIMES DAILY
Qty: 60 TABLET | Refills: 1 | Status: SHIPPED | OUTPATIENT
Start: 2019-01-01 | End: 2020-01-01 | Stop reason: HOSPADM

## 2019-01-01 RX ORDER — OXYCODONE AND ACETAMINOPHEN 10; 325 MG/1; MG/1
1 TABLET ORAL EVERY 6 HOURS PRN
COMMUNITY
Start: 2019-01-01

## 2019-01-01 RX ORDER — CITALOPRAM 20 MG/1
TABLET ORAL
Qty: 90 TABLET | Refills: 0 | Status: SHIPPED | OUTPATIENT
Start: 2019-01-01 | End: 2020-01-01

## 2019-01-01 RX ORDER — ONDANSETRON 2 MG/ML
4 INJECTION INTRAMUSCULAR; INTRAVENOUS EVERY 6 HOURS PRN
Status: DISCONTINUED | OUTPATIENT
Start: 2019-01-01 | End: 2019-01-01 | Stop reason: HOSPADM

## 2019-01-01 RX ORDER — NITROGLYCERIN 0.4 MG/1
0.4 TABLET SUBLINGUAL AS NEEDED
Status: DISCONTINUED | OUTPATIENT
Start: 2019-01-01 | End: 2019-01-01 | Stop reason: HOSPADM

## 2019-01-01 RX ORDER — MIDODRINE HYDROCHLORIDE 5 MG/1
5 TABLET ORAL
Status: DISCONTINUED | OUTPATIENT
Start: 2019-01-01 | End: 2019-01-01

## 2019-01-01 RX ORDER — LIDOCAINE HYDROCHLORIDE 20 MG/ML
JELLY TOPICAL AS NEEDED
Status: DISCONTINUED | OUTPATIENT
Start: 2019-01-01 | End: 2019-01-01 | Stop reason: HOSPADM

## 2019-01-01 RX ORDER — ASPIRIN 81 MG/1
81 TABLET, CHEWABLE ORAL DAILY
Status: DISCONTINUED | OUTPATIENT
Start: 2019-01-01 | End: 2019-01-01 | Stop reason: HOSPADM

## 2019-01-01 RX ORDER — SODIUM CHLORIDE 0.9 % (FLUSH) 0.9 %
10 SYRINGE (ML) INJECTION EVERY 12 HOURS SCHEDULED
Status: DISCONTINUED | OUTPATIENT
Start: 2019-01-01 | End: 2019-01-01 | Stop reason: HOSPADM

## 2019-01-01 RX ORDER — ALLOPURINOL 100 MG/1
100 TABLET ORAL DAILY
Qty: 10 TABLET | Refills: 0 | Status: SHIPPED | OUTPATIENT
Start: 2019-01-01 | End: 2019-01-01 | Stop reason: SDUPTHER

## 2019-01-01 RX ORDER — SODIUM CHLORIDE 0.9 % (FLUSH) 0.9 %
10 SYRINGE (ML) INJECTION AS NEEDED
Status: DISCONTINUED | OUTPATIENT
Start: 2019-01-01 | End: 2019-01-01 | Stop reason: HOSPADM

## 2019-01-01 RX ORDER — POTASSIUM CHLORIDE 750 MG/1
40 CAPSULE, EXTENDED RELEASE ORAL 2 TIMES DAILY
Status: COMPLETED | OUTPATIENT
Start: 2019-01-01 | End: 2019-01-01

## 2019-01-01 RX ORDER — LEVOTHYROXINE SODIUM 0.07 MG/1
75 TABLET ORAL
Status: DISCONTINUED | OUTPATIENT
Start: 2019-01-01 | End: 2019-01-01 | Stop reason: HOSPADM

## 2019-01-01 RX ORDER — SODIUM CHLORIDE 9 MG/ML
75 INJECTION, SOLUTION INTRAVENOUS CONTINUOUS
Status: DISCONTINUED | OUTPATIENT
Start: 2019-01-01 | End: 2019-01-01 | Stop reason: HOSPADM

## 2019-01-01 RX ORDER — SODIUM CHLORIDE 0.9 % (FLUSH) 0.9 %
10 SYRINGE (ML) INJECTION AS NEEDED
Status: DISCONTINUED | OUTPATIENT
Start: 2019-01-01 | End: 2019-01-01

## 2019-01-01 RX ORDER — LEVOTHYROXINE SODIUM 0.1 MG/1
100 TABLET ORAL DAILY
Qty: 30 TABLET | Refills: 0 | Status: SHIPPED | OUTPATIENT
Start: 2019-01-01 | End: 2020-01-01 | Stop reason: SDUPTHER

## 2019-01-01 RX ORDER — PANTOPRAZOLE SODIUM 40 MG/10ML
80 INJECTION, POWDER, LYOPHILIZED, FOR SOLUTION INTRAVENOUS ONCE
Status: COMPLETED | OUTPATIENT
Start: 2019-01-01 | End: 2019-01-01

## 2019-01-01 RX ORDER — POTASSIUM CHLORIDE 7.45 MG/ML
10 INJECTION INTRAVENOUS
Status: COMPLETED | OUTPATIENT
Start: 2019-01-01 | End: 2019-01-01

## 2019-01-01 RX ORDER — PANTOPRAZOLE SODIUM 40 MG/1
40 TABLET, DELAYED RELEASE ORAL
Status: DISCONTINUED | OUTPATIENT
Start: 2019-01-01 | End: 2019-01-01 | Stop reason: HOSPADM

## 2019-01-01 RX ORDER — POTASSIUM CHLORIDE 750 MG/1
40 CAPSULE, EXTENDED RELEASE ORAL ONCE
Status: COMPLETED | OUTPATIENT
Start: 2019-01-01 | End: 2019-01-01

## 2019-01-01 RX ORDER — SODIUM CHLORIDE 9 MG/ML
INJECTION, SOLUTION INTRAVENOUS CONTINUOUS PRN
Status: DISCONTINUED | OUTPATIENT
Start: 2019-01-01 | End: 2019-01-01 | Stop reason: SURG

## 2019-01-01 RX ORDER — SODIUM CHLORIDE 0.9 % (FLUSH) 0.9 %
10 SYRINGE (ML) INJECTION EVERY 12 HOURS SCHEDULED
Status: DISCONTINUED | OUTPATIENT
Start: 2019-01-01 | End: 2019-01-01

## 2019-01-01 RX ORDER — AMPICILLIN TRIHYDRATE 250 MG
1000 CAPSULE ORAL DAILY
COMMUNITY

## 2019-01-01 RX ORDER — LIDOCAINE HYDROCHLORIDE 10 MG/ML
INJECTION, SOLUTION INFILTRATION; PERINEURAL AS NEEDED
Status: DISCONTINUED | OUTPATIENT
Start: 2019-01-01 | End: 2019-01-01 | Stop reason: SURG

## 2019-01-01 RX ORDER — ALLOPURINOL 100 MG/1
100 TABLET ORAL DAILY
Qty: 90 TABLET | Refills: 1 | Status: SHIPPED | OUTPATIENT
Start: 2019-01-01

## 2019-01-01 RX ORDER — FERROUS SULFATE 325(65) MG
325 TABLET ORAL
Qty: 30 TABLET | Refills: 1 | Status: SHIPPED | OUTPATIENT
Start: 2019-01-01 | End: 2019-01-01

## 2019-01-01 RX ORDER — OMEPRAZOLE 40 MG/1
40 CAPSULE, DELAYED RELEASE ORAL DAILY
Qty: 90 CAPSULE | Refills: 1 | Status: SHIPPED | OUTPATIENT
Start: 2019-01-01 | End: 2020-01-01

## 2019-01-01 RX ORDER — ALLOPURINOL 300 MG/1
TABLET ORAL
Qty: 90 TABLET | Refills: 0 | Status: SHIPPED | OUTPATIENT
Start: 2019-01-01 | End: 2019-01-01

## 2019-01-01 RX ORDER — PROPOFOL 10 MG/ML
VIAL (ML) INTRAVENOUS AS NEEDED
Status: DISCONTINUED | OUTPATIENT
Start: 2019-01-01 | End: 2019-01-01 | Stop reason: SURG

## 2019-01-01 RX ORDER — LEVOTHYROXINE SODIUM 0.07 MG/1
75 TABLET ORAL DAILY
COMMUNITY
End: 2019-01-01

## 2019-01-01 RX ORDER — TAMSULOSIN HYDROCHLORIDE 0.4 MG/1
0.8 CAPSULE ORAL DAILY
Status: DISCONTINUED | OUTPATIENT
Start: 2019-01-01 | End: 2019-01-01 | Stop reason: HOSPADM

## 2019-01-01 RX ORDER — GABAPENTIN 600 MG/1
600 TABLET ORAL 2 TIMES DAILY
COMMUNITY
Start: 2019-01-01

## 2019-01-01 RX ORDER — FUROSEMIDE 40 MG/1
40 TABLET ORAL DAILY
Qty: 3 TABLET | Refills: 0 | Status: SHIPPED | OUTPATIENT
Start: 2019-01-01 | End: 2019-01-01

## 2019-01-01 RX ORDER — FUROSEMIDE 40 MG/1
40 TABLET ORAL DAILY
Status: ON HOLD | COMMUNITY
End: 2020-01-01 | Stop reason: SDUPTHER

## 2019-01-01 RX ORDER — ROSUVASTATIN CALCIUM 20 MG/1
40 TABLET, COATED ORAL NIGHTLY
Status: DISCONTINUED | OUTPATIENT
Start: 2019-01-01 | End: 2019-01-01 | Stop reason: HOSPADM

## 2019-01-01 RX ORDER — ASPIRIN 81 MG/1
81 TABLET, CHEWABLE ORAL DAILY
Qty: 30 TABLET | Refills: 1 | Status: SHIPPED | OUTPATIENT
Start: 2019-01-01

## 2019-01-01 RX ADMIN — LIDOCAINE HYDROCHLORIDE: 20 JELLY TOPICAL at 11:20

## 2019-01-01 RX ADMIN — LEVOTHYROXINE SODIUM 75 MCG: 75 TABLET ORAL at 11:23

## 2019-01-01 RX ADMIN — MIDODRINE HYDROCHLORIDE 5 MG: 5 TABLET ORAL at 11:20

## 2019-01-01 RX ADMIN — SODIUM CHLORIDE, PRESERVATIVE FREE 10 ML: 5 INJECTION INTRAVENOUS at 21:04

## 2019-01-01 RX ADMIN — ASPIRIN 81 MG 81 MG: 81 TABLET ORAL at 08:33

## 2019-01-01 RX ADMIN — TAMSULOSIN HYDROCHLORIDE 0.8 MG: 0.4 CAPSULE ORAL at 11:23

## 2019-01-01 RX ADMIN — MIDODRINE HYDROCHLORIDE 5 MG: 5 TABLET ORAL at 06:39

## 2019-01-01 RX ADMIN — ROSUVASTATIN CALCIUM 40 MG: 20 TABLET, COATED ORAL at 20:53

## 2019-01-01 RX ADMIN — MIDODRINE HYDROCHLORIDE 5 MG: 5 TABLET ORAL at 06:30

## 2019-01-01 RX ADMIN — SODIUM CHLORIDE: 9 INJECTION, SOLUTION INTRAVENOUS at 09:01

## 2019-01-01 RX ADMIN — PANTOPRAZOLE SODIUM 40 MG: 40 TABLET, DELAYED RELEASE ORAL at 06:30

## 2019-01-01 RX ADMIN — SODIUM CHLORIDE, PRESERVATIVE FREE 10 ML: 5 INJECTION INTRAVENOUS at 14:19

## 2019-01-01 RX ADMIN — ROSUVASTATIN CALCIUM 40 MG: 20 TABLET, COATED ORAL at 20:00

## 2019-01-01 RX ADMIN — MIDODRINE HYDROCHLORIDE 5 MG: 5 TABLET ORAL at 16:58

## 2019-01-01 RX ADMIN — SODIUM CHLORIDE 75 ML/HR: 9 INJECTION, SOLUTION INTRAVENOUS at 06:26

## 2019-01-01 RX ADMIN — SODIUM CHLORIDE 8 MG/HR: 900 INJECTION INTRAVENOUS at 15:08

## 2019-01-01 RX ADMIN — PANTOPRAZOLE SODIUM 80 MG: 40 INJECTION, POWDER, FOR SOLUTION INTRAVENOUS at 10:48

## 2019-01-01 RX ADMIN — POTASSIUM CHLORIDE 10 MEQ: 7.46 INJECTION, SOLUTION INTRAVENOUS at 16:53

## 2019-01-01 RX ADMIN — SODIUM CHLORIDE, PRESERVATIVE FREE 10 ML: 5 INJECTION INTRAVENOUS at 20:53

## 2019-01-01 RX ADMIN — SODIUM CHLORIDE 8 MG/HR: 900 INJECTION INTRAVENOUS at 20:51

## 2019-01-01 RX ADMIN — LEVOTHYROXINE SODIUM 75 MCG: 75 TABLET ORAL at 05:02

## 2019-01-01 RX ADMIN — ASPIRIN 81 MG 81 MG: 81 TABLET ORAL at 11:23

## 2019-01-01 RX ADMIN — FERUMOXYTOL 510 MG: 510 INJECTION INTRAVENOUS at 13:40

## 2019-01-01 RX ADMIN — LIDOCAINE HYDROCHLORIDE 50 MG: 10 INJECTION, SOLUTION INFILTRATION; PERINEURAL at 09:13

## 2019-01-01 RX ADMIN — SODIUM CHLORIDE, PRESERVATIVE FREE 10 ML: 5 INJECTION INTRAVENOUS at 20:00

## 2019-01-01 RX ADMIN — SODIUM CHLORIDE 75 ML/HR: 9 INJECTION, SOLUTION INTRAVENOUS at 14:20

## 2019-01-01 RX ADMIN — SODIUM CHLORIDE 1000 ML: 9 INJECTION, SOLUTION INTRAVENOUS at 10:48

## 2019-01-01 RX ADMIN — SODIUM CHLORIDE 75 ML/HR: 9 INJECTION, SOLUTION INTRAVENOUS at 02:40

## 2019-01-01 RX ADMIN — POTASSIUM CHLORIDE 10 MEQ: 7.46 INJECTION, SOLUTION INTRAVENOUS at 15:53

## 2019-01-01 RX ADMIN — PROPOFOL 75 MG: 10 INJECTION, EMULSION INTRAVENOUS at 09:13

## 2019-01-01 RX ADMIN — TAMSULOSIN HYDROCHLORIDE 0.8 MG: 0.4 CAPSULE ORAL at 08:39

## 2019-01-01 RX ADMIN — POTASSIUM CHLORIDE 40 MEQ: 750 CAPSULE, EXTENDED RELEASE ORAL at 21:04

## 2019-01-01 RX ADMIN — POTASSIUM CHLORIDE 40 MEQ: 750 CAPSULE, EXTENDED RELEASE ORAL at 12:06

## 2019-01-01 RX ADMIN — ASPIRIN 81 MG 81 MG: 81 TABLET ORAL at 08:39

## 2019-01-01 RX ADMIN — PANTOPRAZOLE SODIUM 40 MG: 40 TABLET, DELAYED RELEASE ORAL at 06:39

## 2019-01-01 RX ADMIN — ASPIRIN 81 MG 81 MG: 81 TABLET ORAL at 14:15

## 2019-01-01 RX ADMIN — FERUMOXYTOL 510 MG: 510 INJECTION INTRAVENOUS at 13:58

## 2019-01-01 RX ADMIN — ROSUVASTATIN CALCIUM 40 MG: 20 TABLET, COATED ORAL at 21:04

## 2019-01-01 RX ADMIN — PANTOPRAZOLE SODIUM 40 MG: 40 TABLET, DELAYED RELEASE ORAL at 05:02

## 2019-01-01 RX ADMIN — SODIUM CHLORIDE 75 ML/HR: 9 INJECTION, SOLUTION INTRAVENOUS at 17:10

## 2019-01-01 RX ADMIN — TAMSULOSIN HYDROCHLORIDE 0.8 MG: 0.4 CAPSULE ORAL at 08:36

## 2019-01-01 RX ADMIN — POTASSIUM CHLORIDE 40 MEQ: 750 CAPSULE, EXTENDED RELEASE ORAL at 10:57

## 2019-01-01 RX ADMIN — MIDODRINE HYDROCHLORIDE 5 MG: 5 TABLET ORAL at 17:09

## 2019-01-01 RX ADMIN — SODIUM CHLORIDE, PRESERVATIVE FREE 10 ML: 5 INJECTION INTRAVENOUS at 08:41

## 2019-02-06 ENCOUNTER — OFFICE VISIT (OUTPATIENT)
Dept: CARDIOLOGY | Facility: CLINIC | Age: 74
End: 2019-02-06

## 2019-02-06 VITALS
DIASTOLIC BLOOD PRESSURE: 60 MMHG | HEART RATE: 60 BPM | SYSTOLIC BLOOD PRESSURE: 112 MMHG | WEIGHT: 205 LBS | BODY MASS INDEX: 30.36 KG/M2 | HEIGHT: 69 IN

## 2019-02-06 DIAGNOSIS — I10 ESSENTIAL HYPERTENSION: ICD-10-CM

## 2019-02-06 DIAGNOSIS — N18.1 TYPE 2 DIABETES MELLITUS WITH STAGE 1 CHRONIC KIDNEY DISEASE, WITHOUT LONG-TERM CURRENT USE OF INSULIN (HCC): ICD-10-CM

## 2019-02-06 DIAGNOSIS — E78.5 DYSLIPIDEMIA: ICD-10-CM

## 2019-02-06 DIAGNOSIS — R00.2 PALPITATIONS: ICD-10-CM

## 2019-02-06 DIAGNOSIS — I25.9 ISCHEMIC HEART DISEASE: ICD-10-CM

## 2019-02-06 DIAGNOSIS — E11.22 TYPE 2 DIABETES MELLITUS WITH STAGE 1 CHRONIC KIDNEY DISEASE, WITHOUT LONG-TERM CURRENT USE OF INSULIN (HCC): ICD-10-CM

## 2019-02-06 DIAGNOSIS — R06.02 SHORTNESS OF BREATH: Primary | ICD-10-CM

## 2019-02-06 PROCEDURE — 99214 OFFICE O/P EST MOD 30 MIN: CPT | Performed by: INTERNAL MEDICINE

## 2019-02-06 PROCEDURE — 93000 ELECTROCARDIOGRAM COMPLETE: CPT | Performed by: INTERNAL MEDICINE

## 2019-02-06 RX ORDER — HYDROCHLOROTHIAZIDE 25 MG/1
25 TABLET ORAL DAILY
COMMUNITY
End: 2019-01-01

## 2019-02-06 RX ORDER — DULOXETIN HYDROCHLORIDE 30 MG/1
CAPSULE, DELAYED RELEASE ORAL DAILY
COMMUNITY
Start: 2019-01-18 | End: 2019-01-01

## 2019-02-06 RX ORDER — AMLODIPINE BESYLATE 10 MG/1
10 TABLET ORAL DAILY
COMMUNITY
End: 2019-01-01

## 2019-02-06 NOTE — PROGRESS NOTES
Subjective:     Encounter Date:02/06/2019    Patient ID: Fidencio Zarate Jr. is a 73 y.o.  white male, retired DJ, from [place], [place].      FORMER INTERNIST: Marlo King MD, [facility]  CURRENT HEALTHCARE PROVIDER:  Deborah Henson PA-C  ONCOLOGIST: Fawad Zelaya MD  OTOLARYNGOLOGIST: Barrie Solano MD  RADIATION ONCOLOGIST: Kenneth Rodriguez MD  PAIN MANAGEMENT: Toni Pollock MD  SURGEON: Son Andrade MD  INTERVENTIONAL CARDIOLOGIST: Son Lam MD, [facility], [facility]    Chief Complaint:   Chief Complaint   Patient presents with   • Atherosclerosis of coronary artery bypass graft of native he     Problem List:  1. Ischemic heart disease:  a. Remote acute inferolateral myocardial infarction with apparent severe single vessel obstructive coronary disease and preserved left ventricular function--data deficit, October 1984.   b. Recurrent myocardial infarction--data deficit, 1989 with subsequent recurrent progressive chest pain syndrome and aortocoronary bypass graft placement x6--data deficit, January 1990.  c. Remote acceptable nondiagnostic GXT without symptoms, March 1995.   d. Progressive chest pain syndrome with diagnostic coronary arteriography demonstrating 3-vessel obstructive coronary disease with proximal segment occlusion with incomplete visualization of the LIMA-LAD and high grade stenosis of the vein graft of the first diagonal branch of the left anterior descending artery with occluded vein graft to the left circumflex coronary artery with patent graft to the distal PDA with mild left ventricular enlargement and mild-moderate inferobasal hypokinesis with angioplasty and stent deployment of the diagonal branch of the LAD, [facility], May 2007.  e. Abnormal acceptable combination Doppler echocardiogram, LVEF (0.55) with abnormal acceptable 24-hour Holter monitor, autumn 2009.  f. Remote progressive CCS class II-III chest pain syndrome with NYHA class II exertional  dyspnea and diagnostic coronary angiography demonstrating normal left ventricular function with severe three vessel obstructive disease and patent LIMA-LAD with patent stents vein graft to the D1-LAD and patent vein graft to the RCA with chronically occluded circumflex vein graft with adequate right and left collaterals of the left circumflex coronary artery with coronary anatomy remaining unchanged from remote intervention, 2007.   g. Left heart cath, 06/17/2016, Dr. Son Lam, with normal hemodynamics, moderate reduction in LVEF (0.40) with severe 3 vessel obstructive coronary disease with occlusion of all coronary arteries and patent LIMA and SVG x2 with continued medical therapy felt warranted.  h. Echocardiogram 10/6/2016; LVEF 0.50, LV wall segment contract abnormally, RV moderately dilated, LA moderately dilated, mild concentric hypertrophy, LV moderately dilated, no evidence of pericardial effusion, RVSP less than 35 mmHg  i. Residual CCS class I angina pectoris/NYHA class II-III exertional dyspnea and fatigue associated with tachypalpitation, February 2019  2. Chronic hypertension--probably essential with recent labile blood pressure readings.   3. Dyslipidemia.   4. Chronic tobacco abuse.  5. Mild obesity, BMI 30.3.  6. Chronic lower tract obstructive symptoms--probable BPH.  7. Hyperuricemia with intermittent gout.   8. Vitamin D deficiency.   9. Chronic low back pain with neurosurgical evaluation.   10. Erectile dysfunction.   11. Elevated serum glucose--possible type 2 pre-diabetes mellitus; hemoglobin A1c 5.5%, June 2016  12. Type 2 diabetes mellitus, hemoglobin A1c 6.7%, September 2018.  13. Remote operations:  a. Right foot surgery, 1962.   b. Appendectomy.  c. Coronary artery bypass graft x6 - 1990.   d. Cataract removal with lens implant, [facility], 2009.  e. Cholecystectomy, July 2016  14. Remote left neck mass with abnormal PET scan demonstrating 3.5 cm. x 2.5 cm. mass with adjacent 9 mm lymph node,  "hypermetabolic, with contemplated radiation/chemotherapy vs. resection, January 2011.  15. Noncompliance, resolved.   16. Remote progressive disabling headache syndrome felt to be secondary to cluster headaches with headache specialist evaluation, MRI and ophthalmology assessment x2 - data deficit, June-December 2015.  17. Remote progressive nausea and chest pain syndrome with apparent acceptable diagnostic coronary angiography and ERCP followed by laparoscopic cholecystectomy and liver biopsy, 07/08/2016.  18. Stage 3 chronic kidney disease        Allergies   Allergen Reactions   • Penicillins Rash and Other (See Comments)     \"flu-like\" symptoms   • Crestor [Rosuvastatin Calcium] Other (See Comments) and Myalgia     Arthralgias   • Lipitor [Atorvastatin] Other (See Comments) and Myalgia     Arthralgias   • Metformin Nausea Only   • Zocor [Simvastatin] Other (See Comments) and Myalgia     Arthralgias         Current Outpatient Medications:   •  allopurinol (ZYLOPRIM) 300 MG tablet, Take 1 tablet by mouth Daily., Disp: 90 tablet, Rfl: 3  •  amLODIPine (NORVASC) 10 MG tablet, Take 10 mg by mouth Daily., Disp: , Rfl:   •  apixaban (ELIQUIS) 5 MG tablet tablet, Take 1 tablet by mouth Every 12 (Twelve) Hours., Disp: 180 tablet, Rfl: 3  •  chlorthalidone (HYGROTON) 25 MG tablet, Take 1 tablet by mouth Daily., Disp: 90 tablet, Rfl: 1  •  cholecalciferol (VITAMIN D3) 13551 units capsule, Take 1 capsule by mouth 1 (One) Time Per Week. (Patient taking differently: Take 1,000 Units by mouth Daily.), Disp: 12 capsule, Rfl: 1  •  citalopram (CeleXA) 20 MG tablet, Take 1 tablet by mouth Daily., Disp: 90 tablet, Rfl: 3  •  DULoxetine (CYMBALTA) 30 MG capsule, Daily., Disp: , Rfl:   •  gabapentin (NEURONTIN) 300 MG capsule, Take 1 capsule by mouth 3 (Three) Times a Day. (Patient taking differently: Take 600 mg by mouth 3 (Three) Times a Day.), Disp: 90 capsule, Rfl: 1  •  hydrochlorothiazide (HYDRODIURIL) 25 MG tablet, Take 25 mg " "by mouth Daily., Disp: , Rfl:   •  HYDROcodone-acetaminophen (NORCO)  MG per tablet, 1 tablet Every 6 (Six) Hours As Needed., Disp: , Rfl:   •  levothyroxine (SYNTHROID, LEVOTHROID) 75 MCG tablet, Take 1 tablet by mouth Daily. (Patient taking differently: Take 50 mcg by mouth Daily.), Disp: 90 tablet, Rfl: 3  •  lisinopril (PRINIVIL,ZESTRIL) 10 MG tablet, Take 0.5 tablets by mouth Daily. (Patient taking differently: Take 10 mg by mouth Daily.), Disp: 90 tablet, Rfl: 3 PATIENT IS NOT TAKING  •  LUBRICANT EYE DROPS 0.4-0.3 % solution ophthalmic solution, Administer 1 drop to both eyes at night as needed., Disp: , Rfl:   •  metoprolol succinate XL (TOPROL-XL) 25 MG 24 hr tablet, Take 1 tablet by mouth Daily., Disp: 90 tablet, Rfl: 3  •  nitroglycerin (NITROSTAT) 0.4 MG SL tablet, Place 1 tablet under the tongue As Needed for Chest Pain., Disp: 10 tablet, Rfl: 11  •  omeprazole (priLOSEC) 20 MG capsule, Take 1 capsule by mouth Daily., Disp: 90 capsule, Rfl: 3  •  pravastatin (PRAVACHOL) 80 MG tablet, Take 1 tablet by mouth Daily., Disp: 90 tablet, Rfl: 3  •  tamsulosin (FLOMAX) 0.4 MG capsule 24 hr capsule, Take 1 capsule by mouth Every Night., Disp: 90 capsule, Rfl: 3    HISTORY OF PRESENT ILLNESS: Patient returns for followup after an 18-month hiatus.  He says he has not been doing well recently and has had a few episodes of \"atrial fibrillation.\"  He states that he feels short of breath, and he feels it \"in my chest,\" but his blood pressure has been staying low.  He does not feel his heart beating fast.  He went to have nerve blocks done, but his pulse was 45, and his blood pressure was 116/65, so they would not do the nerve blocks.  He has not recently had an EKG done.  He was having his chest pain one day, and his wife told him that he needed to go to the doctor; he notes that his healthcare provider had tried him on 2 different medications, but he could not take either one.  He says that metformin caused " "nausea, and he notes that lisinopril made him feel like he could not get out of bed; however, lisinopril is listed as something that he is taking.  He had difficulty getting his pravastatin refilled because it was not being sent to [facility], but he has it now and is taking it.  He was told by Dr. King's office that our office needed to call it in because we had prescribed it.  He recently had a physical in Ms. Henson's office (December 2018) and had labs drawn; data deficit.  He has copies at home and will send those results to our office.  Patient otherwise denies prolonged chest pain, unrelieved shortness of breath, PND, edema, persistent palpitations, syncope or presyncope at this time on limited activity.        Review of Systems   Constitution: Positive for malaise/fatigue.   HENT: Positive for tinnitus.    Eyes: Positive for visual halos.   Cardiovascular: Positive for irregular heartbeat.   Endocrine: Positive for polydipsia and polyuria.   Musculoskeletal: Positive for arthritis, back pain and gout.   Genitourinary: Positive for frequency.      Obtained and otherwise negative except as outlined in problem list and HPI.      ECG 12 Lead  Date/Time: 2/6/2019 12:49 PM  Performed by: Darien Rodrigues MD  Authorized by: Darien Rodrigues MD   Comparison: compared with previous ECG from 4/7/2017  Similar to previous ECG  Rhythm: sinus rhythm  Ectopy: atrial premature contractions  BPM: 60  Q waves: III and aVF  Clinical impression: abnormal ECG  Comments: QRS 96 ms  QTc 492 ms   ms               Objective:       Vitals:    02/06/19 0935 02/06/19 0938   BP: 123/64 112/60   BP Location: Right arm Right arm   Patient Position: Sitting Standing   Pulse: 54 60   Weight: 93 kg (205 lb)    Height: 175.3 cm (69\")      Body mass index is 30.27 kg/m².   Last weight:  210 lbs.    Physical Exam   Constitutional: He is oriented to person, place, and time. He appears well-developed and well-nourished.   Neck: No JVD " present. Carotid bruit is not present. No thyromegaly present.   Cardiovascular: Regular rhythm, S1 normal and S2 normal. Exam reveals distant heart sounds. Exam reveals no gallop, no S3 and no friction rub.   Murmur heard.   Medium-pitched early systolic murmur is present with a grade of 2/6 at the lower left sternal border.  Pulses:       Carotid pulses are 1+ on the right side, and 1+ on the left side.       Radial pulses are 1+ on the right side, and 1+ on the left side.        Femoral pulses are 1+ on the right side, and 1+ on the left side.       Popliteal pulses are 1+ on the right side, and 1+ on the left side.        Dorsalis pedis pulses are 1+ on the right side, and 1+ on the left side.        Posterior tibial pulses are 1+ on the right side, and 1+ on the left side.   Pulmonary/Chest: Effort normal. He has decreased breath sounds. He has no wheezes. He has no rhonchi. He has no rales.   Abdominal: Soft. He exhibits no mass. There is no hepatosplenomegaly. There is no tenderness. There is no guarding.   Bowel sounds audible x4   Musculoskeletal: Normal range of motion. He exhibits no edema.   Lymphadenopathy:     He has no cervical adenopathy.   Neurological: He is alert and oriented to person, place, and time.   Skin: Skin is warm, dry and intact. No rash noted.   Vitals reviewed.        Lab Review:   Lab Results   Component Value Date    GLUCOSE 129 (H) 04/14/2017    BUN 8 (L) 09/28/2018    CREATININE 1.28 09/28/2018    EGFRIFNONA 55 (L) 09/28/2018    EGFRIFAFRI 67 09/28/2018    BCR 6.3 (L) 09/28/2018    CO2 27.0 09/28/2018    CALCIUM 9.6 09/28/2018    PROTENTOTREF 6.7 09/28/2018    ALBUMIN 4.61 09/28/2018    LABIL2 2.2 09/28/2018    AST 24 09/28/2018    ALT 13 09/28/2018   Glucose - 144  Sodium - 138  Potassium - 3.8  Chloride - 94    Lab Results   Component Value Date    WBC 8.53 09/28/2018    HGB 12.8 (L) 09/28/2018    HCT 41.8 09/28/2018    MCV 85.8 09/28/2018     09/28/2018       Lab Results    Component Value Date    HGBA1C 6.70 (H) 09/28/2018       Lab Results   Component Value Date    TSH 2.075 09/28/2018       Lab Results   Component Value Date    CHOL 151 06/16/2016     Total cholesterol - 220 (09/28/2018)    Lab Results   Component Value Date    TRIG 262 (H) 09/28/2018    TRIG 85 06/16/2016     Lab Results   Component Value Date    HDL 53 09/28/2018    HDL 46 06/16/2016     Lab Results   Component Value Date     (H) 09/28/2018    LDL 64 06/16/2016       Lab Results   Component Value Date    .0 (H) 10/12/2018     Urine creatinine - 64.8  Urine microalbumin - 131.9  Microalbumin/creatinine ratio - 203.5    Vitamin  D - 44.0 (09/28/2018)        Assessment:   Overall continued acceptable course with no interim cardiopulmonary complaints with limited functional status. We will defer additional diagnostic or therapeutic intervention from a cardiac perspective at this time.  His symptoms are perplexing, and his history is not concise.  I do not feel he is having progressive angina pectoris at this time but may need repeat screening with either myocardial perfusion study or diagnostic coronary angiography if his symptoms are not resolved following noninvasive studies outlined below.       Diagnosis Plan   1. Shortness of breath  Holter Monitor - 72 Hour Up To 21 Days    Adult Transthoracic Echo Complete W/ Cont if Necessary Per Protocol   2. Palpitations  Holter Monitor - 72 Hour Up To 21 Days    Adult Transthoracic Echo Complete W/ Cont if Necessary Per Protocol   3. Essential hypertension  Acceptable control   4. Ischemic heart disease  Will assess echocardiogram; may possibly need MPS versus C   5. Dyslipidemia  Suboptimal control; will review lab studies from December 2018 if he makes those available.   6. Type 2 diabetes mellitus with stage 1 chronic kidney disease, without long-term current use of insulin (CMS/[facility])  Would consider Jardiance 10 to 25 mg daily if Ms. Henson concurs           Plan:         1. Patient to continue current medications and close follow up with the above provider other than to initiate Zetia 10 mg daily.  2. The following studies are recommended:   A. Echocardiogram   B. Event monitor  3. Tentative cardiology follow up in 3-4 weeks, or patient may return sooner PRN.    Transcribed by Amy Vazquez for Dr. Darien Rodrigues at 9:51 AM on 02/06/2019    I, Darien Rodrigues MD, [facility], personally performed the services described in this documentation as scribed by the above named individual in my presence, and it is both accurate and complete. At 12:49 PM on 02/06/2019

## 2019-03-06 ENCOUNTER — HOSPITAL ENCOUNTER (OUTPATIENT)
Dept: CARDIOLOGY | Facility: HOSPITAL | Age: 74
Discharge: HOME | End: 2019-03-06
Admitting: INTERNAL MEDICINE

## 2019-03-06 VITALS
HEIGHT: 69 IN | SYSTOLIC BLOOD PRESSURE: 128 MMHG | DIASTOLIC BLOOD PRESSURE: 63 MMHG | WEIGHT: 202.82 LBS | BODY MASS INDEX: 30.04 KG/M2

## 2019-03-06 DIAGNOSIS — R06.02 SHORTNESS OF BREATH: ICD-10-CM

## 2019-03-06 DIAGNOSIS — R00.2 PALPITATIONS: ICD-10-CM

## 2019-03-06 LAB
ASCENDING AORTA: 3.4 CM
BH CV ECHO MEAS - AO ROOT AREA (BSA CORRECTED): 2.1
BH CV ECHO MEAS - AO ROOT AREA: 15.2 CM^2
BH CV ECHO MEAS - AO ROOT DIAM: 4.4 CM
BH CV ECHO MEAS - ASC AORTA: 3.4 CM
BH CV ECHO MEAS - BSA(HAYCOCK): 2.2 M^2
BH CV ECHO MEAS - BSA: 2.1 M^2
BH CV ECHO MEAS - BZI_BMI: 30.3 KILOGRAMS/M^2
BH CV ECHO MEAS - BZI_METRIC_HEIGHT: 175.3 CM
BH CV ECHO MEAS - BZI_METRIC_WEIGHT: 93 KG
BH CV ECHO MEAS - EDV(CUBED): 236 ML
BH CV ECHO MEAS - EDV(MOD-SP2): 125 ML
BH CV ECHO MEAS - EDV(MOD-SP4): 123 ML
BH CV ECHO MEAS - EDV(TEICH): 192.6 ML
BH CV ECHO MEAS - EF(CUBED): 48.9 %
BH CV ECHO MEAS - EF(MOD-BP): 37 %
BH CV ECHO MEAS - EF(MOD-SP2): 39.2 %
BH CV ECHO MEAS - EF(MOD-SP4): 38.2 %
BH CV ECHO MEAS - EF(TEICH): 40.3 %
BH CV ECHO MEAS - ESV(CUBED): 120.6 ML
BH CV ECHO MEAS - ESV(MOD-SP2): 76 ML
BH CV ECHO MEAS - ESV(MOD-SP4): 76 ML
BH CV ECHO MEAS - ESV(TEICH): 115 ML
BH CV ECHO MEAS - FS: 20.1 %
BH CV ECHO MEAS - IVS/LVPW: 1.5
BH CV ECHO MEAS - IVSD: 1.4 CM
BH CV ECHO MEAS - LA DIMENSION: 4.4 CM
BH CV ECHO MEAS - LA/AO: 1
BH CV ECHO MEAS - LAD MAJOR: 6.2 CM
BH CV ECHO MEAS - LAT PEAK E' VEL: 5.2 CM/SEC
BH CV ECHO MEAS - LATERAL E/E' RATIO: 20.4
BH CV ECHO MEAS - LV DIASTOLIC VOL/BSA (35-75): 58.9 ML/M^2
BH CV ECHO MEAS - LV IVRT: 0.05 SEC
BH CV ECHO MEAS - LV MASS(C)D: 327 GRAMS
BH CV ECHO MEAS - LV MASS(C)DI: 156.6 GRAMS/M^2
BH CV ECHO MEAS - LV MAX PG: 3.1 MMHG
BH CV ECHO MEAS - LV MEAN PG: 1 MMHG
BH CV ECHO MEAS - LV SYSTOLIC VOL/BSA (12-30): 36.4 ML/M^2
BH CV ECHO MEAS - LV V1 MAX: 88 CM/SEC
BH CV ECHO MEAS - LV V1 MEAN: 52.8 CM/SEC
BH CV ECHO MEAS - LV V1 VTI: 19.5 CM
BH CV ECHO MEAS - LVIDD: 6.2 CM
BH CV ECHO MEAS - LVIDS: 4.9 CM
BH CV ECHO MEAS - LVLD AP2: 8.3 CM
BH CV ECHO MEAS - LVLD AP4: 8.6 CM
BH CV ECHO MEAS - LVLS AP2: 7.6 CM
BH CV ECHO MEAS - LVLS AP4: 8.2 CM
BH CV ECHO MEAS - LVOT AREA (M): 3.5 CM^2
BH CV ECHO MEAS - LVOT AREA: 3.5 CM^2
BH CV ECHO MEAS - LVOT DIAM: 2.1 CM
BH CV ECHO MEAS - LVPWD: 1.2 CM
BH CV ECHO MEAS - MED PEAK E' VEL: 4.8 CM/SEC
BH CV ECHO MEAS - MEDIAL E/E' RATIO: 21.7
BH CV ECHO MEAS - MV A MAX VEL: 83.4 CM/SEC
BH CV ECHO MEAS - MV DEC SLOPE: 364 CM/SEC^2
BH CV ECHO MEAS - MV DEC TIME: 0.22 SEC
BH CV ECHO MEAS - MV E MAX VEL: 105 CM/SEC
BH CV ECHO MEAS - MV E/A: 1.3
BH CV ECHO MEAS - MV P1/2T MAX VEL: 111 CM/SEC
BH CV ECHO MEAS - MV P1/2T: 89.3 MSEC
BH CV ECHO MEAS - MVA P1/2T LCG: 2 CM^2
BH CV ECHO MEAS - MVA(P1/2T): 2.5 CM^2
BH CV ECHO MEAS - PA ACC SLOPE: 757 CM/SEC^2
BH CV ECHO MEAS - PA ACC TIME: 0.09 SEC
BH CV ECHO MEAS - PA PR(ACCEL): 39.4 MMHG
BH CV ECHO MEAS - PI END-D VEL: 124 CM/SEC
BH CV ECHO MEAS - RAP SYSTOLE: 3 MMHG
BH CV ECHO MEAS - RVSP: 10.4 MMHG
BH CV ECHO MEAS - SI(CUBED): 55.3 ML/M^2
BH CV ECHO MEAS - SI(LVOT): 32.4 ML/M^2
BH CV ECHO MEAS - SI(MOD-SP2): 23.5 ML/M^2
BH CV ECHO MEAS - SI(MOD-SP4): 22.5 ML/M^2
BH CV ECHO MEAS - SI(TEICH): 37.2 ML/M^2
BH CV ECHO MEAS - SV(CUBED): 115.5 ML
BH CV ECHO MEAS - SV(LVOT): 67.5 ML
BH CV ECHO MEAS - SV(MOD-SP2): 49 ML
BH CV ECHO MEAS - SV(MOD-SP4): 47 ML
BH CV ECHO MEAS - SV(TEICH): 77.6 ML
BH CV ECHO MEAS - TAPSE (>1.6): 1.6 CM2
BH CV ECHO MEAS - TR MAX PG: 7.4 MMHG
BH CV ECHO MEAS - TR MAX VEL: 136 CM/SEC
BH CV ECHO MEASUREMENTS AVERAGE E/E' RATIO: 21
BH CV VAS BP LEFT ARM: NORMAL MMHG
BH CV XLRA - RV BASE: 3.8 CM
BH CV XLRA - RV LENGTH: 7.1 CM
BH CV XLRA - RV MID: 3.5 CM
LEFT ATRIUM VOLUME INDEX: 45 ML/M^2
LEFT ATRIUM VOLUME: 86 ML
LV EF 2D ECHO EST: 40 %

## 2019-03-06 PROCEDURE — 93306 TTE W/DOPPLER COMPLETE: CPT

## 2019-03-06 PROCEDURE — 93306 TTE W/DOPPLER COMPLETE: CPT | Performed by: INTERNAL MEDICINE

## 2019-03-08 ENCOUNTER — OFFICE VISIT (OUTPATIENT)
Dept: CARDIOLOGY | Facility: CLINIC | Age: 74
End: 2019-03-08

## 2019-03-08 VITALS
DIASTOLIC BLOOD PRESSURE: 68 MMHG | WEIGHT: 203 LBS | OXYGEN SATURATION: 94 % | SYSTOLIC BLOOD PRESSURE: 119 MMHG | HEART RATE: 67 BPM | BODY MASS INDEX: 30.07 KG/M2 | HEIGHT: 69 IN

## 2019-03-08 DIAGNOSIS — I25.9 ISCHEMIC HEART DISEASE: Primary | ICD-10-CM

## 2019-03-08 DIAGNOSIS — E78.5 DYSLIPIDEMIA: ICD-10-CM

## 2019-03-08 DIAGNOSIS — I48.0 PAROXYSMAL ATRIAL FIBRILLATION (HCC): ICD-10-CM

## 2019-03-08 DIAGNOSIS — I50.42 CHRONIC COMBINED SYSTOLIC AND DIASTOLIC CONGESTIVE HEART FAILURE (HCC): ICD-10-CM

## 2019-03-08 PROCEDURE — 99214 OFFICE O/P EST MOD 30 MIN: CPT | Performed by: INTERNAL MEDICINE

## 2019-03-08 RX ORDER — SOTALOL HYDROCHLORIDE 80 MG/1
40 TABLET ORAL 2 TIMES DAILY
Qty: 30 TABLET | Refills: 11 | Status: SHIPPED | OUTPATIENT
Start: 2019-03-08 | End: 2019-03-13 | Stop reason: SDUPTHER

## 2019-03-08 NOTE — PROGRESS NOTES
Subjective:     Encounter Date:03/08/2019    Patient ID: Fidencio Zarate Jr. is a 73 y.o. male  white male, retired DJ, from [place], [place].      FORMER INTERNIST: Marlo King MD, [facility]  CURRENT HEALTHCARE PROVIDER:  Deborah Henson PA-C  ONCOLOGIST: Fawad Zelaya MD  OTOLARYNGOLOGIST: Barrie Solano MD  RADIATION ONCOLOGIST: Kenneth Rodriguez MD  PAIN MANAGEMENT: Toni Pollock MD  SURGEON: Son Andrade MD  INTERVENTIONAL CARDIOLOGIST: Son Lam MD, [facility], [facility]    Chief Complaint:   Chief Complaint   Patient presents with   • Atrial Fibrillation     Problem List:  1. Ischemic heart disease:  a. Remote acute inferolateral myocardial infarction with apparent severe single vessel obstructive coronary disease and preserved left ventricular function--data deficit, October 1984.   b. Recurrent myocardial infarction--data deficit, 1989 with subsequent recurrent progressive chest pain syndrome and aortocoronary bypass graft placement x6--data deficit, January 1990.  c. Remote acceptable nondiagnostic GXT without symptoms, March 1995.   d. Progressive chest pain syndrome with diagnostic coronary arteriography demonstrating 3-vessel obstructive coronary disease with proximal segment occlusion with incomplete visualization of the LIMA-LAD and high grade stenosis of the vein graft of the first diagonal branch of the left anterior descending artery with occluded vein graft to the left circumflex coronary artery with patent graft to the distal PDA with mild left ventricular enlargement and mild-moderate inferobasal hypokinesis with angioplasty and stent deployment of the diagonal branch of the LAD, [facility], May 2007.  e. Abnormal acceptable combination Doppler echocardiogram, LVEF (0.55) with abnormal acceptable 24-hour Holter monitor, autumn 2009.  f. Remote progressive CCS class II-III chest pain syndrome with NYHA class II exertional dyspnea and diagnostic coronary  angiography demonstrating normal left ventricular function with severe three vessel obstructive disease and patent LIMA-LAD with patent stents vein graft to the D1-LAD and patent vein graft to the RCA with chronically occluded circumflex vein graft with adequate right and left collaterals of the left circumflex coronary artery with coronary anatomy remaining unchanged from remote intervention, 2007.   g. Left heart cath, 06/17/2016, Dr. Son Lam, with normal hemodynamics, moderate reduction in LVEF (0.40) with severe 3 vessel obstructive coronary disease with occlusion of all coronary arteries and patent LIMA and SVG x2 with continued medical therapy felt warranted.  h. Echocardiogram 10/6/2016; LVEF 0.50, LV wall segment contract abnormally, RV moderately dilated, LA moderately dilated, mild concentric hypertrophy, LV moderately dilated, no evidence of pericardial effusion, RVSP less than 35 mmHg  i. Residual CCS class I angina pectoris/NYHA class II-III exertional dyspnea and fatigue associated with tachypalpitation, February 2019  2. Chronic hypertension--probably essential with recent labile blood pressure readings.   3. Intermittent atrial fibrillation with recent recurrent palpitation and abnormal ZIO/XT patch and echocardiogram with sotalol recommended, March 2019 (CHADS2 Vasc score 5 - 6.7% stroke per year) with chronic apixaban  4. Dyslipidemia.   5. Chronic tobacco abuse.  6. Mild obesity, BMI 30.  7. Chronic lower tract obstructive symptoms--probable BPH.  8. Hyperuricemia with intermittent gout.   9. Vitamin D deficiency.   10. Chronic low back pain with neurosurgical evaluation.   11. Erectile dysfunction.   12. Elevated serum glucose--possible type 2 pre-diabetes mellitus; hemoglobin A1c 5.5%, June 2016  13. Type 2 diabetes mellitus, hemoglobin A1c 6.7%, September 2018.  14. Remote operations:  a. Right foot surgery, 1962.   b. Appendectomy.  c. Coronary artery bypass graft x6 - 1990.  "  d. Cataract removal with lens implant, [facility], 2009.  e. Cholecystectomy, July 2016  15. Remote left neck mass with abnormal PET scan demonstrating 3.5 cm. x 2.5 cm. mass with adjacent 9 mm lymph node, hypermetabolic, with contemplated radiation/chemotherapy vs. resection, January 2011.  16. Noncompliance, resolved.   17. Remote progressive disabling headache syndrome felt to be secondary to cluster headaches with headache specialist evaluation, MRI and ophthalmology assessment x2 - data deficit, June-December 2015.  18. Remote progressive nausea and chest pain syndrome with apparent acceptable diagnostic coronary angiography and ERCP followed by laparoscopic cholecystectomy and liver biopsy, 07/08/2016.  19. Stage 3 chronic kidney disease            Allergies   Allergen Reactions   • Penicillins Rash and Other (See Comments)     \"flu-like\" symptoms   • Crestor [Rosuvastatin Calcium] Other (See Comments) and Myalgia     Arthralgias   • Lipitor [Atorvastatin] Other (See Comments) and Myalgia     Arthralgias   • Metformin Nausea Only   • Zocor [Simvastatin] Other (See Comments) and Myalgia     Arthralgias         Current Outpatient Medications:   •  allopurinol (ZYLOPRIM) 300 MG tablet, Take 1 tablet by mouth Daily., Disp: 90 tablet, Rfl: 3  •  amLODIPine (NORVASC) 10 MG tablet, Take 10 mg by mouth Daily., Disp: , Rfl:   •  apixaban (ELIQUIS) 5 MG tablet tablet, Take 1 tablet by mouth Every 12 (Twelve) Hours., Disp: 180 tablet, Rfl: 3  •  chlorthalidone (HYGROTON) 25 MG tablet, Take 1 tablet by mouth Daily., Disp: 90 tablet, Rfl: 1  •  cholecalciferol (VITAMIN D3) 90348 units capsule, Take 1 capsule by mouth 1 (One) Time Per Week. (Patient taking differently: Take 1,000 Units by mouth Daily.), Disp: 12 capsule, Rfl: 1  •  citalopram (CeleXA) 20 MG tablet, Take 1 tablet by mouth Daily., Disp: 90 tablet, Rfl: 3  •  gabapentin (NEURONTIN) 300 MG capsule, Take 1 capsule by mouth 3 (Three) Times a Day. (Patient taking " "differently: Take 600 mg by mouth 3 (Three) Times a Day.), Disp: 90 capsule, Rfl: 1  •  hydrochlorothiazide (HYDRODIURIL) 25 MG tablet, Take 25 mg by mouth Daily., Disp: , Rfl:   •  HYDROcodone-acetaminophen (NORCO)  MG per tablet, 1 tablet Every 6 (Six) Hours As Needed., Disp: , Rfl:   •  levothyroxine (SYNTHROID, LEVOTHROID) 75 MCG tablet, Take 1 tablet by mouth Daily. (Patient taking differently: Take 50 mcg by mouth Daily.), Disp: 90 tablet, Rfl: 3  •  lisinopril (PRINIVIL,ZESTRIL) 10 MG tablet, Take 0.5 tablets by mouth Daily. (Patient taking differently: Take 10 mg by mouth Daily.), Disp: 90 tablet, Rfl: 3  •  LUBRICANT EYE DROPS 0.4-0.3 % solution ophthalmic solution, Administer 1 drop to both eyes at night as needed., Disp: , Rfl:   •  nitroglycerin (NITROSTAT) 0.4 MG SL tablet, Place 1 tablet under the tongue As Needed for Chest Pain., Disp: 10 tablet, Rfl: 11  •  omeprazole (priLOSEC) 20 MG capsule, Take 1 capsule by mouth Daily., Disp: 90 capsule, Rfl: 3  •  pravastatin (PRAVACHOL) 80 MG tablet, Take 1 tablet by mouth Daily., Disp: 90 tablet, Rfl: 3  •  tamsulosin (FLOMAX) 0.4 MG capsule 24 hr capsule, Take 1 capsule by mouth Every Night., Disp: 90 capsule, Rfl: 3  •  DULoxetine (CYMBALTA) 30 MG capsule, Daily., Disp: , Rfl:     HISTORY OF PRESENT ILLNESS: Patient returns for scheduled 1-month followup. He is accompanied to the office today by his wife. He stopped taking the metoprolol succinate when he received our letter in regard to his ZIO/XT monitor; however, we had told him to keep taking it until we could see what his kidney function was.  We have discussed medication changes with him, and his wife states that he has had trouble in the past with beta-blockers.  The patient says that he does not feel short of breath but \"just feels dragged out.\"  He would like to do some physical rehab, but he says he is not able to walk much because of his back and knee pain.  He does not feel he can " "participate in cardiac rehabilitation at this time.  He has consulted a pain specialist for his back, but they have not done much for him yet.  The patient's wife says that he is unable to take lisinopril because it \"made him so sick\" - the patient says he was very weak and could hardly get out of bed - and metformin because \"it made me go pee all the time.\"  It is of note that he was taking both these medications when he felt he was intolerant of them.  Patient otherwise denies chest pain, severe shortness of breath, PND, edema, palpitations, syncope or presyncope at this time on limited activity.        Review of Systems   Constitution: Positive for decreased appetite, diaphoresis, weakness, malaise/fatigue and night sweats.   HENT: Positive for hearing loss and tinnitus.    Cardiovascular: Positive for chest pain and irregular heartbeat.   Endocrine: Positive for cold intolerance, polydipsia and polyuria.   Hematologic/Lymphatic: Bruises/bleeds easily.   Musculoskeletal: Positive for arthritis and back pain.   Neurological: Positive for excessive daytime sleepiness, headaches and loss of balance.   Psychiatric/Behavioral: Positive for altered mental status (decreased concentration).      Obtained and otherwise negative except as outlined in problem list and HPI.    Procedures       Objective:       Vitals:    03/08/19 1526 03/08/19 1534   BP: 98/82 119/68   BP Location: Right arm Right arm   Patient Position: Sitting Standing   Pulse: 72 67   SpO2: 94%    Weight: 92.1 kg (203 lb)    Height: 175.3 cm (69\")      Body mass index is 29.98 kg/m².   Last weight:  205 lbs.    Physical Exam   Constitutional: He is oriented to person, place, and time. He appears well-developed and well-nourished.   Neck: No JVD present. Carotid bruit is not present. No thyromegaly present.   Cardiovascular: Regular rhythm, S1 normal and S2 normal. Exam reveals no gallop, no S3 and no friction rub.   Murmur heard.   Medium-pitched early " systolic murmur is present with a grade of 2/6 at the lower left sternal border.  Pulses:       Carotid pulses are 1+ on the right side, and 1+ on the left side.       Radial pulses are 1+ on the right side, and 1+ on the left side.        Femoral pulses are 1+ on the right side, and 1+ on the left side.       Popliteal pulses are 1+ on the right side, and 1+ on the left side.        Dorsalis pedis pulses are 1+ on the right side, and 1+ on the left side.        Posterior tibial pulses are 1+ on the right side, and 1+ on the left side.   Pulmonary/Chest: Effort normal. He has decreased breath sounds. He has no wheezes. He has no rhonchi. He has no rales.   Abdominal: Soft. He exhibits no mass. There is no hepatosplenomegaly. There is no tenderness. There is no guarding.   Bowel sounds audible x4   Musculoskeletal: Normal range of motion. He exhibits no edema.   Lymphadenopathy:     He has no cervical adenopathy.   Neurological: He is alert and oriented to person, place, and time.   Skin: Skin is warm, dry and intact. No rash noted.   Vitals reviewed.        Lab Review:   Lab Results   Component Value Date    GLUCOSE 129 (H) 04/14/2017    BUN 8 (L) 09/28/2018    CREATININE 1.28 09/28/2018    EGFRIFNONA 55 (L) 09/28/2018    EGFRIFAFRI 67 09/28/2018    BCR 6.3 (L) 09/28/2018    CO2 27.0 09/28/2018    CALCIUM 9.6 09/28/2018    PROTENTOTREF 6.7 09/28/2018    ALBUMIN 4.61 09/28/2018    LABIL2 2.2 09/28/2018    AST 24 09/28/2018    ALT 13 09/28/2018       Lab Results   Component Value Date    WBC 8.53 09/28/2018    HGB 12.8 (L) 09/28/2018    HCT 41.8 09/28/2018    MCV 85.8 09/28/2018     09/28/2018       Lab Results   Component Value Date    HGBA1C 6.70 (H) 09/28/2018       Lab Results   Component Value Date    TSH 2.075 09/28/2018       Lab Results   Component Value Date    CHOL 151 06/16/2016     Lab Results   Component Value Date    TRIG 262 (H) 09/28/2018    TRIG 85 06/16/2016     Lab Results   Component Value  Date    HDL 53 09/28/2018    HDL 46 06/16/2016     Lab Results   Component Value Date     (H) 09/28/2018    LDL 64 06/16/2016       Lab Results   Component Value Date    .0 (H) 10/12/2018     Echocardiogram, 03/06/2019:  · Left ventricular wall thickness is consistent with mild concentric hypertrophy.  · Left atrial cavity size is mild-to-moderately dilated.  · Moderate dilation of the aortic root and of the sinuses of Valsalva present.  · The following left ventricular wall segments are hypokinetic: apical lateral, basal inferolateral, mid inferolateral, apical inferior, basal inferoseptal, apex hypokinetic and basal inferoseptal. The following left ventricular wall segments are akinetic: basal inferior and mid inferior.  · The left ventricular cavity is mild-to-moderately dilated.  · Estimated EF = 40%.  · Left ventricular systolic function is moderately decreased.  · Left ventricular diastolic dysfunction (grade II) consistent with pseudonormalization.  · Right ventricular cavity is mildly dilated.  · There is mild calcification of the aortic valve mainly affecting the non coronary cusp(s).  · Mild mitral valve regurgitation is present.  · Normal right ventricular wall thickness, systolic function and septal motion noted with right ventricular cavity mildly dilated.  · The aortic valve exhibits sclerosis.  · No evidence of pulmonary hypertension is present.  · There is no evidence of pericardial effusion.    ZIO/XT monitor, February/March 2019:  Abnormal study with predominantly normal sinus rhythm with frequent atrial ectopy (10.5% of all heartbeats) with intermittent brief moderate-rapid atrial fibrillation with trial of sotalol recommended.      Assessment:   Overall continued acceptable course with no interim cardiopulmonary complaints with marginal functional status. We will defer additional diagnostic or therapeutic intervention from a cardiac perspective at this time other than to adjust his  medications as outlined below and attempt to suppress some of his atrial ectopy.       Diagnosis Plan   1. Ischemic heart disease  No recurrent angina pectoris or CHF on current activity schedule; continue current treatment     2. Paroxysmal atrial fibrillation (CMS/HCC)  See below   3. Chronic combined systolic and diastolic congestive heart failure (CMS/HCC)  See below   4. Dyslipidemia  See below; may need to consider altering pravastatin to rosuvastatin 40 mg daily with goal LDL cholesterol at or below 70 mg/dL          Plan:         1. Patient to continue current medications and close follow up with the above providers with the following additional medication:   A. Sotalol 40 mg bid   B. Entresto 24/26 bid  2. The patient will return to the office on the morning of 13 March 2019 for the following studies:   A. Blood pressure, sitting and standing   B. Weight   C. CMP   D. Magnesium level   E. FLP   F. EKG  3. Tentative cardiology follow up in 6 weeks, or patient may return sooner PRN.    Transcribed by Amy Vazquez for Dr. Darien Rodrigues at 3:44 PM on 03/08/2019    IDarien MD, [facility], personally performed the services described in this documentation as scribed by the above named individual in my presence, and it is both accurate and complete. At 3:50 PM on 03/08/2019

## 2019-03-09 RX ORDER — GABAPENTIN 600 MG/1
TABLET ORAL
COMMUNITY
Start: 2019-02-13 | End: 2019-03-09

## 2019-03-09 RX ORDER — GABAPENTIN 600 MG/1
600 TABLET ORAL 3 TIMES DAILY
Qty: 90 TABLET | Refills: 0 | OUTPATIENT
Start: 2019-03-09 | End: 2019-01-01

## 2019-03-12 ENCOUNTER — DOCUMENTATION (OUTPATIENT)
Dept: CARDIOLOGY | Facility: CLINIC | Age: 74
End: 2019-03-12

## 2019-03-12 NOTE — PROGRESS NOTES
Prior authorization for Entresto 24-26 mg BID has been sent to plan via cover my meds. Awaiting response.

## 2019-03-13 ENCOUNTER — LAB REQUISITION (OUTPATIENT)
Dept: LAB | Facility: HOSPITAL | Age: 74
End: 2019-03-13

## 2019-03-13 ENCOUNTER — CLINICAL SUPPORT (OUTPATIENT)
Dept: CARDIOLOGY | Facility: CLINIC | Age: 74
End: 2019-03-13

## 2019-03-13 VITALS — DIASTOLIC BLOOD PRESSURE: 74 MMHG | SYSTOLIC BLOOD PRESSURE: 132 MMHG

## 2019-03-13 DIAGNOSIS — I50.9 ACUTE HEART FAILURE, UNSPECIFIED HEART FAILURE TYPE (HCC): ICD-10-CM

## 2019-03-13 DIAGNOSIS — I48.0 PAROXYSMAL ATRIAL FIBRILLATION (HCC): Primary | ICD-10-CM

## 2019-03-13 DIAGNOSIS — Z00.00 ROUTINE GENERAL MEDICAL EXAMINATION AT A HEALTH CARE FACILITY: ICD-10-CM

## 2019-03-13 DIAGNOSIS — E78.5 HYPERLIPIDEMIA, UNSPECIFIED HYPERLIPIDEMIA TYPE: Primary | ICD-10-CM

## 2019-03-13 LAB
ALBUMIN SERPL-MCNC: 4.46 G/DL (ref 3.2–4.8)
ALBUMIN/GLOB SERPL: 2.3 G/DL (ref 1.5–2.5)
ALP SERPL-CCNC: 61 U/L (ref 25–100)
ALT SERPL W P-5'-P-CCNC: 13 U/L (ref 7–40)
ANION GAP SERPL CALCULATED.3IONS-SCNC: 10 MMOL/L (ref 3–11)
ARTICHOKE IGE QN: 114 MG/DL (ref 0–130)
AST SERPL-CCNC: 24 U/L (ref 0–33)
BILIRUB SERPL-MCNC: 0.5 MG/DL (ref 0.3–1.2)
BUN BLD-MCNC: 11 MG/DL (ref 9–23)
BUN/CREAT SERPL: 8.9 (ref 7–25)
CALCIUM SPEC-SCNC: 9.6 MG/DL (ref 8.7–10.4)
CHLORIDE SERPL-SCNC: 102 MMOL/L (ref 99–109)
CHOLEST SERPL-MCNC: 185 MG/DL (ref 0–200)
CO2 SERPL-SCNC: 28 MMOL/L (ref 20–31)
CREAT BLD-MCNC: 1.23 MG/DL (ref 0.6–1.3)
GFR SERPL CREATININE-BSD FRML MDRD: 58 ML/MIN/1.73
GLOBULIN UR ELPH-MCNC: 1.9 GM/DL
GLUCOSE BLD-MCNC: 118 MG/DL (ref 70–100)
HDLC SERPL-MCNC: 53 MG/DL (ref 40–60)
MAGNESIUM SERPL-MCNC: 2 MG/DL (ref 1.3–2.7)
POTASSIUM BLD-SCNC: 4.2 MMOL/L (ref 3.5–5.5)
PROT SERPL-MCNC: 6.4 G/DL (ref 5.7–8.2)
SODIUM BLD-SCNC: 140 MMOL/L (ref 132–146)
TRIGL SERPL-MCNC: 222 MG/DL (ref 0–150)

## 2019-03-13 PROCEDURE — 80053 COMPREHEN METABOLIC PANEL: CPT | Performed by: INTERNAL MEDICINE

## 2019-03-13 PROCEDURE — 80061 LIPID PANEL: CPT | Performed by: INTERNAL MEDICINE

## 2019-03-13 PROCEDURE — 93000 ELECTROCARDIOGRAM COMPLETE: CPT | Performed by: INTERNAL MEDICINE

## 2019-03-13 PROCEDURE — 83735 ASSAY OF MAGNESIUM: CPT | Performed by: INTERNAL MEDICINE

## 2019-03-13 RX ORDER — SOTALOL HYDROCHLORIDE 80 MG/1
80 TABLET ORAL 2 TIMES DAILY
Qty: 60 TABLET | Refills: 6 | Status: SHIPPED | OUTPATIENT
Start: 2019-03-13 | End: 2019-01-01

## 2019-03-18 ENCOUNTER — RESULTS ENCOUNTER (OUTPATIENT)
Dept: CARDIOLOGY | Facility: CLINIC | Age: 74
End: 2019-03-18

## 2019-03-18 DIAGNOSIS — E78.5 HYPERLIPIDEMIA, UNSPECIFIED HYPERLIPIDEMIA TYPE: ICD-10-CM

## 2019-03-18 DIAGNOSIS — I50.9 ACUTE HEART FAILURE, UNSPECIFIED HEART FAILURE TYPE (HCC): ICD-10-CM

## 2019-03-19 RX ORDER — ICOSAPENT ETHYL 1000 MG/1
2 CAPSULE ORAL 2 TIMES DAILY WITH MEALS
Qty: 120 CAPSULE | Refills: 11 | Status: SHIPPED | OUTPATIENT
Start: 2019-03-19 | End: 2019-01-01

## 2019-03-20 ENCOUNTER — CLINICAL SUPPORT (OUTPATIENT)
Dept: CARDIOLOGY | Facility: CLINIC | Age: 74
End: 2019-03-20

## 2019-03-20 VITALS
BODY MASS INDEX: 30.1 KG/M2 | SYSTOLIC BLOOD PRESSURE: 152 MMHG | DIASTOLIC BLOOD PRESSURE: 98 MMHG | HEART RATE: 68 BPM | WEIGHT: 203.8 LBS

## 2019-03-20 DIAGNOSIS — I48.0 PAROXYSMAL ATRIAL FIBRILLATION (HCC): Primary | ICD-10-CM

## 2019-03-20 PROCEDURE — 93000 ELECTROCARDIOGRAM COMPLETE: CPT | Performed by: INTERNAL MEDICINE

## 2019-03-20 RX ORDER — ROSUVASTATIN CALCIUM 40 MG/1
40 TABLET, COATED ORAL DAILY
Qty: 30 TABLET | Refills: 11 | Status: SHIPPED | OUTPATIENT
Start: 2019-03-20 | End: 2020-01-01

## 2019-03-21 NOTE — PROGRESS NOTES
Chief Complaint   Patient presents with   • Diabetes     3 mo f/u   • Hypertension     3 mo f/u       HPI     Fidencio Zarate Jr. is a pleasant 73 y.o. male who is here for routine diabetes type 2 and hypertension.  Patient's hemoglobin AIC is 6.1% today.  He has been watching his diet and started cinnamon.  He recently was having more issues with his heart.  Atrial fibrillation with worsening fatigue.  He has been to see Dr. Rodrigues and was started on sotalol, entresto and eliquis.  He is tolerating the medication okay.  He is hoping to have a procedure completed at pain management soon to help with his chronic back pain, pending appointment.  Was told by Dr. Rodrigues he would be able to proceed with this procedure.  He reports stable mood with celexa 20 mg QD.    Past Medical History:   Diagnosis Date   • Arthritis    • Cancer (CMS/HCC)     thoat cancer   • Coronary artery disease    • Gout    • History of chemotherapy    • Hyperlipidemia    • Hypertension    • Injury of back    • Low back pain    • Myocardial infarction (CMS/HCC)     x2   • PAF (paroxysmal atrial fibrillation) (CMS/HCC)    • Rotator cuff rupture    • Uses hearing aid     bilat        Past Surgical History:   Procedure Laterality Date   • APPENDECTOMY     • CARDIAC CATHETERIZATION      x1 stent   • CARDIAC CATHETERIZATION N/A 6/17/2016    Procedure: Left Heart Cath;  Surgeon: Son Lam MD;  Location: [facility] CATH INVASIVE LOCATION;  Service:    • CARDIAC SURGERY     • CATARACT EXTRACTION  2009   • CHOLECYSTECTOMY LAPAROSCOPIC INTRAOPERATIVE CHOLANGIOGRAM WITH LIVER BIOPSY N/A 7/8/2016    Procedure: CHOLECYSTECTOMY LAPAROSCOPIC WITH LIVER BIOPSY;  Surgeon: Son Andrade MD;  Location: [facility] OR;  Service:    • COLONOSCOPY      x2   • CORONARY ARTERY BYPASS GRAFT     • FOOT SURGERY  1962    right    • FRACTURE SURGERY     • LASIK     • [place] ERCP DX COLLECTION SPECIMEN BRUSHING/WASHING N/A 6/24/2016    Procedure: ENDOSCOPIC RETROGRADE  "CHOLANGIOPANCREATOGRAPHY;  Surgeon: Omari Cardenas MD;  Location: [facility] ENDOSCOPY;  Service: Gastroenterology   • TONSILLECTOMY     • WISDOM TOOTH EXTRACTION         Family History   Problem Relation Age of Onset   • COPD Mother    • Depression Mother    • Hypertension Mother        Social History     Socioeconomic History   • Marital status:      Spouse name: Not on file   • Number of children: Not on file   • Years of education: Not on file   • Highest education level: Not on file   Tobacco Use   • Smoking status: Former Smoker     Packs/day: 1.50     Years: 50.00     Pack years: 75.00     Types: Cigarettes     Last attempt to quit: 6/15/2010     Years since quittin.7   • Smokeless tobacco: Never Used   Substance and Sexual Activity   • Alcohol use: Yes     Comment: beer occasional   • Drug use: No   • Sexual activity: Defer   Social History Narrative    Patient consumes 4 diet pepsi sodas daily.     Patient lives at home with wife.        Allergies   Allergen Reactions   • Penicillins Rash and Other (See Comments)     \"flu-like\" symptoms   • Crestor [Rosuvastatin Calcium] Other (See Comments) and Myalgia     Arthralgias   • Lipitor [Atorvastatin] Other (See Comments) and Myalgia     Arthralgias   • Metformin Nausea Only   • Zocor [Simvastatin] Other (See Comments) and Myalgia     Arthralgias       ROS    Review of Systems    Vitals:    19 1246   BP: 138/72   BP Location: Right arm   Patient Position: Sitting   Cuff Size: Adult   Pulse: (!) 49   SpO2: 95%   Weight: 91.3 kg (201 lb 3.2 oz)   Height: 175.3 cm (69\")       Current Outpatient Medications on File Prior to Visit   Medication Sig Dispense Refill   • allopurinol (ZYLOPRIM) 300 MG tablet Take 1 tablet by mouth Daily. 90 tablet 3   • apixaban (ELIQUIS) 5 MG tablet tablet Take 1 tablet by mouth Every 12 (Twelve) Hours. 180 tablet 3   • cholecalciferol (VITAMIN D3) 33124 units capsule Take 1 capsule by mouth 1 (One) Time Per " Week. (Patient taking differently: Take 1,000 Units by mouth Daily.) 12 capsule 1   • CINNAMON PO Take 1,000 mg by mouth Daily.     • citalopram (CeleXA) 20 MG tablet Take 1 tablet by mouth Daily. 90 tablet 3   • gabapentin (NEURONTIN) 600 MG tablet Take 1 tablet by mouth 3 (Three) Times a Day for 30 days. 90 tablet 0   • hydrochlorothiazide (HYDRODIURIL) 25 MG tablet Take 25 mg by mouth Daily.     • HYDROcodone-acetaminophen (NORCO)  MG per tablet 1 tablet Every 6 (Six) Hours As Needed.     • icosapent ethyl (VASCEPA) 1 g capsule capsule Take 2 g by mouth 2 (Two) Times a Day With Meals. 120 capsule 11   • levothyroxine (SYNTHROID, LEVOTHROID) 75 MCG tablet Take 1 tablet by mouth Daily. (Patient taking differently: Take 50 mcg by mouth Daily.) 90 tablet 3   • LUBRICANT EYE DROPS 0.4-0.3 % solution ophthalmic solution Administer 1 drop to both eyes at night as needed.     • nitroglycerin (NITROSTAT) 0.4 MG SL tablet Place 1 tablet under the tongue As Needed for Chest Pain. 10 tablet 11   • omeprazole (priLOSEC) 20 MG capsule Take 1 capsule by mouth Daily. 90 capsule 3   • rosuvastatin (CRESTOR) 40 MG tablet Take 1 tablet by mouth Daily. 30 tablet 11   • sacubitril-valsartan (ENTRESTO) 24-26 MG tablet Take 1 tablet by mouth 2 (Two) Times a Day. 60 tablet 11   • sotalol (BETAPACE) 80 MG tablet Take 1 tablet by mouth 2 (Two) Times a Day. 60 tablet 6   • tamsulosin (FLOMAX) 0.4 MG capsule 24 hr capsule Take 1 capsule by mouth Every Night. 90 capsule 3   • [DISCONTINUED] amLODIPine (NORVASC) 10 MG tablet Take 10 mg by mouth Daily.     • [DISCONTINUED] chlorthalidone (HYGROTON) 25 MG tablet Take 1 tablet by mouth Daily. 90 tablet 1   • [DISCONTINUED] DULoxetine (CYMBALTA) 30 MG capsule Daily.       No current facility-administered medications on file prior to visit.        Results for orders placed or performed in visit on 03/21/19   POC Glycosylated Hemoglobin (Hb A1C)   Result Value Ref Range    Hemoglobin A1C 6.1  %       PE    Physical Exam   Constitutional: He appears well-developed and well-nourished. No distress.   HENT:   Head: Normocephalic and atraumatic.   Eyes: EOM are normal.   Neck: Normal range of motion.   Cardiovascular: Normal rate and regular rhythm. Exam reveals distant heart sounds.   Pulmonary/Chest: Effort normal and breath sounds normal.   Musculoskeletal: Normal range of motion. He exhibits no edema.   Neurological: He is alert.   Skin: Skin is warm. He is not diaphoretic. No erythema.   Psychiatric: He has a normal mood and affect. His speech is normal and behavior is normal. Judgment and thought content normal. He is not actively hallucinating. He is attentive.       SHAILESH/P    Fidencio was seen today for diabetes and hypertension.    Diagnoses and all orders for this visit:    Type 2 diabetes mellitus with stage 1 chronic kidney disease, without long-term current use of insulin (CMS/HCC)  -recent hemoglobin AIC has improved at 6.1%  -okay to manage with diet and cinnamon  -will monitor closely and patient is aware if it becomes elevated we will have to manage with medication  -     POC Glycosylated Hemoglobin (Hb A1C)    Essential hypertension  -stable, well-controlled today    Fatigue, unspecified type  -ongoing issue, most likely related to chronic co-morbidities and medication    Paroxysmal atrial fibrillation (CMS/HCC)  -recently evaluated by Dr. oRdrigues  -started on eliquis and sotalol    Chronic pain syndrome  -followed by pain management  -clear by Dr. Rodrigues to have upcoming rhizotomy/epidural injection    Chronic combined systolic and diastolic congestive heart failure (CMS/HCC)  -recently started on entresto  -patient is worried he may not be able to afford this  -gave samples today, he will contact cardiology and insurance       Plan of care reviewed with patient at the conclusion of today's visit. Education was provided regarding diagnosis, management and any prescribed or recommended OTC  medications.  Patient verbalizes understanding of and agreement with management plan.    Return in about 3 months (around 6/21/2019) for Recheck, DM II and HTN.     Deborah Henson PA-C

## 2019-04-08 NOTE — TELEPHONE ENCOUNTER
Vidya Zarate called about her  with Dizziness, Falling into Walls, Unsteady [place], and Sleeping a lot not wanting to get out of bed.   She felt it started after taking Sotalol.  He has stopped taking Sotalol and HCTZ due to Low BP but they have not recorded any BP or HR.     I have notified Pat to work patient in on the next cancelation.  I have encouraged Ms. Zarate to take his BP twice daily and monitor his weight since he has not been taking the HCTZ.     Telephone conversation given to KTS for further orders.

## 2019-04-08 NOTE — TELEPHONE ENCOUNTER
Pat was able to get Pt in This Wednesday. Mrs. Zarate will bring BP & HR & Weight records with her. She will also have list of all current medications he is taking.

## 2019-05-01 NOTE — TELEPHONE ENCOUNTER
Tried to call patient about labs.  No answer, left message.  Cardiac marker for possible ischemia/heart attack was completely normal and reassuring.  His BNP did show congestion/fluid on his heart and I recommend that he take lasix 40 mg daily for 3 days.  Prescription sent in.  Take this in morning, will make him have to urinate.  May affect blood pressure, check and make sure it isn't too low.  Please have patient call cardiologist and schedule appointment to be seen.

## 2019-05-07 NOTE — PROGRESS NOTES
Subjective:     Encounter Date:05/09/2019      Patient ID: Fidencio Zarate Jr. is a 73 y.o. male  white male, retired DJ, from [place], [place].      FORMER INTERNIST: Marlo King MD, [facility]  CURRENT HEALTHCARE PROVIDER:  Deborah Henson PA-C  ONCOLOGIST: Fawad Zelaya MD  OTOLARYNGOLOGIST: Barrie Solano MD  RADIATION ONCOLOGIST: Kenneth Rodriguez MD  PAIN MANAGEMENT: Toni Pollock MD  SURGEON: Son Andrade MD  INTERVENTIONAL CARDIOLOGIST: Son Lam MD, [facility], [facility].    Chief Complaint:   Chief Complaint   Patient presents with   • Unstable angina pectoris     Problem List:  1. Ischemic heart disease:  a. Remote acute inferolateral myocardial infarction with apparent severe single vessel obstructive coronary disease and preserved left ventricular function--data deficit, October 1984.   b. Recurrent myocardial infarction--data deficit, 1989 with subsequent recurrent progressive chest pain syndrome and aortocoronary bypass graft placement x6--data deficit, January 1990.  c. Remote acceptable nondiagnostic GXT without symptoms, March 1995.   d. Progressive chest pain syndrome with diagnostic coronary arteriography demonstrating 3-vessel obstructive coronary disease with proximal segment occlusion with incomplete visualization of the LIMA-LAD and high grade stenosis of the vein graft of the first diagonal branch of the left anterior descending artery with occluded vein graft to the left circumflex coronary artery with patent graft to the distal PDA with mild left ventricular enlargement and mild-moderate inferobasal hypokinesis with angioplasty and stent deployment of the diagonal branch of the LAD, [facility], May 2007.  e. Abnormal acceptable combination Doppler echocardiogram, LVEF (0.55) with abnormal acceptable 24-hour Holter monitor, autumn 2009.  f. Remote progressive CCS class II-III chest pain syndrome with NYHA class II exertional dyspnea and diagnostic  coronary angiography demonstrating normal left ventricular function with severe three vessel obstructive disease and patent LIMA-LAD with patent stents vein graft to the D1-LAD and patent vein graft to the RCA with chronically occluded circumflex vein graft with adequate right and left collaterals of the left circumflex coronary artery with coronary anatomy remaining unchanged from remote intervention, 2007.   g. Left heart cath, 06/17/2016, Dr. Son Lam, with normal hemodynamics, moderate reduction in LVEF (0.40) with severe 3 vessel obstructive coronary disease with occlusion of all coronary arteries and patent LIMA and SVG x2 with continued medical therapy felt warranted.  h. Echocardiogram 10/6/2016; LVEF 0.50, LV wall segment contract abnormally, RV moderately dilated, LA moderately dilated, mild concentric hypertrophy, LV moderately dilated, no evidence of pericardial effusion, RVSP less than 35 mmHg  i. Residual CCS class I angina pectoris/NYHA class II-III exertional dyspnea and fatigue associated with tachypalpitation, February 2019  j. Echocardiogram 3/6/2019: LV wall thickness consistent with mild concentric hypertrophy, LA mild to moderately dilated, moderate dilation of the aortic root and of the sinuses of Valsalva present.  The following LV wall segments are hypokinetic: Apical lateral, basal inferolateral, mid inferolateral, apical inferior, basal inferoseptal, apex hypokinetic and basal inferoseptal.  The following LV wall segments are akinetic: Basal inferior and mid inferior.  LV cavity mild to moderately dilated.  EF 40%, LV diastolic dysfunction grade 2 consistent with pseudonormalization.  RV mildly dilated.  Mild calcification of the aortic valve mainly affecting the noncoronary cusps.  Mild MR, normal RV wall thickness, systolic function and septal motion noted with RV cavity mildly dilated, aortic valve exhibits sclerosis, no pulmonary hypertension, no pericardial effusion   k. CCS  "class 0 chest discomfort/NYHA class II fatigue  2. Chronic hypertension--probably essential with recent labile blood pressure readings.   3. Intermittent atrial fibrillation with recent recurrent palpitation and abnormal ZIO/XT patch and echocardiogram with sotalol recommended, March 2019 (CHADS2 Vasc score 5 - 6.7% stroke per year) with chronic apixaban, sotalol discontinued after intolerance due to dizziness, metoprolol initiated May 2019  4. Dyslipidemia.   5. Chronic tobacco abuse.  6. Mild obesity, BMI 30.  7. Chronic lower tract obstructive symptoms--probable BPH.  8. Hyperuricemia with intermittent gout.   9. Vitamin D deficiency.   10. Chronic low back pain with neurosurgical evaluation.   11. Erectile dysfunction.   12. Elevated serum glucose--possible type 2 pre-diabetes mellitus; hemoglobin A1c 5.5%, June 2016  13. Type 2 diabetes mellitus, hemoglobin A1c 6.7%, September 2018, 6.1% March 2019.  14. Remote operations:  a. Right foot surgery, 1962.   b. Appendectomy.  c. Coronary artery bypass graft x6 - 1990.   d. Cataract removal with lens implant, [facility], 2009.  e. Cholecystectomy, July 2016  15. Remote left neck mass with abnormal PET scan demonstrating 3.5 cm. x 2.5 cm. mass with adjacent 9 mm lymph node, hypermetabolic, with contemplated radiation/chemotherapy vs. resection, January 2011.  16. Noncompliance, resolved.   17. Remote progressive disabling headache syndrome felt to be secondary to cluster headaches with headache specialist evaluation, MRI and ophthalmology assessment x2 - data deficit, June-December 2015.  18. Remote progressive nausea and chest pain syndrome with apparent acceptable diagnostic coronary angiography and ERCP followed by laparoscopic cholecystectomy and liver biopsy, 07/08/2016.  19. Stage 3 chronic kidney disease  Allergies   Allergen Reactions   • Penicillins Rash and Other (See Comments)     \"flu-like\" symptoms   • Lipitor [Atorvastatin] Other (See Comments) and Myalgia     " Arthralgias   • Metformin Nausea Only   • Sotalol Dizziness   • Zocor [Simvastatin] Other (See Comments) and Myalgia     Arthralgias         Current Outpatient Medications:   •  allopurinol (ZYLOPRIM) 300 MG tablet, Take 1 tablet by mouth Daily., Disp: 90 tablet, Rfl: 3  •  apixaban (ELIQUIS) 5 MG tablet tablet, Take 1 tablet by mouth Every 12 (Twelve) Hours., Disp: 180 tablet, Rfl: 3  •  cholecalciferol (VITAMIN D3) 22063 units capsule, Take 1 capsule by mouth 1 (One) Time Per Week. (Patient taking differently: Take 1,000 Units by mouth Daily.), Disp: 12 capsule, Rfl: 1  •  CINNAMON PO, Take 1,000 mg by mouth Daily., Disp: , Rfl:   •  citalopram (CeleXA) 20 MG tablet, Take 1 tablet by mouth Daily., Disp: 90 tablet, Rfl: 3  •  gabapentin (NEURONTIN) 600 MG tablet, 600 mg 3 (Three) Times a Day., Disp: , Rfl:   •  hydrochlorothiazide (HYDRODIURIL) 25 MG tablet, Take 25 mg by mouth Daily., Disp: , Rfl:   •  levothyroxine (SYNTHROID, LEVOTHROID) 75 MCG tablet, Take 1 tablet by mouth Daily. (Patient taking differently: Take 50 mcg by mouth Daily.), Disp: 90 tablet, Rfl: 3  •  LUBRICANT EYE DROPS 0.4-0.3 % solution ophthalmic solution, Administer 1 drop to both eyes at night as needed., Disp: , Rfl:   •  metoprolol tartrate (LOPRESSOR) 25 MG tablet, Take 1 tablet by mouth 2 (Two) Times a Day for 90 days., Disp: 180 tablet, Rfl: 2  •  nitroglycerin (NITROSTAT) 0.4 MG SL tablet, Place 1 tablet under the tongue As Needed for Chest Pain., Disp: 10 tablet, Rfl: 11  •  omeprazole (priLOSEC) 40 MG capsule, Take 1 capsule by mouth Daily., Disp: 90 capsule, Rfl: 1  •  oxyCODONE-acetaminophen (PERCOCET) 7.5-325 MG per tablet, , Disp: , Rfl:   •  rosuvastatin (CRESTOR) 40 MG tablet, Take 1 tablet by mouth Daily., Disp: 30 tablet, Rfl: 11  •  sacubitril-valsartan (ENTRESTO) 24-26 MG tablet, Take 1 tablet by mouth 2 (Two) Times a Day., Disp: 60 tablet, Rfl: 11  •  tamsulosin (FLOMAX) 0.4 MG capsule 24 hr capsule, Take 1 capsule by  "mouth Every Night., Disp: 90 capsule, Rfl: 3    HISTORY OF PRESENT ILLNESS:  Patient is here for 3-month follow-up.  His cardiac monitor in March 2019 demonstrated 10.5% atrial ectopy and he was started on sotalol.  Also, his echocardiogram demonstrated EF 40% and he was started on Entresto.  Patient had dizziness and unsteady gait on the sotalol and stopped taking it April 2019.  The patient just started on metoprolol yesterday.  His blood pressures have been very low at home and he feels fatigued.  He will have some orthostatic dizziness but has never actually passed out.  He has not experienced any more palpitations and denies any chest pressure.  When he was having palpitations he would often feel shortness of breath that was radiating to his neck.  He has not experienced any of these symptoms lately.  He is supposed to go get his last back injection soon.      Review of Systems   Constitution: Positive for decreased appetite, weakness and night sweats.   HENT: Positive for tinnitus.    Eyes: Positive for blurred vision.   Endocrine: Positive for polydipsia.   Hematologic/Lymphatic: Bruises/bleeds easily.   Musculoskeletal: Positive for arthritis, back pain, gout, muscle weakness, neck pain and stiffness.   Gastrointestinal: Positive for constipation.   Genitourinary: Positive for frequency.   Neurological: Positive for disturbances in coordination, excessive daytime sleepiness, dizziness and light-headedness.      Obtained and otherwise negative except as outlined in problem list and HPI.    Procedures       Objective:       Vitals:    05/09/19 1410 05/09/19 1417   BP: (!) 81/52 (!) 63/40   BP Location: Right arm Right arm   Patient Position: Sitting Standing   Pulse: 51 52   Weight: 90.3 kg (199 lb)    Height: 175.3 cm (69\")    Recheck blood pressure left arm sitting was 78/50  Body mass index is 29.39 kg/m².  Last weight March 2019 was 203 pounds  Physical Exam   Constitutional: He is oriented to person, " place, and time. He appears well-developed and well-nourished.   Neck: No JVD present. Carotid bruit is not present. No thyromegaly present.   Cardiovascular: Regular rhythm, S1 normal and S2 normal. Exam reveals no gallop, no S3 and no friction rub.   Murmur heard.   Medium-pitched harsh early systolic murmur is present with a grade of 2/6 at the lower left sternal border.  Pulses:       Carotid pulses are 1+ on the right side, and 1+ on the left side.       Radial pulses are 1+ on the right side, and 1+ on the left side.        Femoral pulses are 1+ on the right side, and 1+ on the left side.       Popliteal pulses are 1+ on the right side, and 1+ on the left side.        Dorsalis pedis pulses are 1+ on the right side, and 1+ on the left side.        Posterior tibial pulses are 1+ on the right side, and 1+ on the left side.   Pulmonary/Chest: Effort normal. He has decreased breath sounds. He has no wheezes. He has no rhonchi. He has no rales.   Abdominal: Soft. He exhibits no mass. There is no hepatosplenomegaly. There is no tenderness. There is no guarding.   Bowel sounds audible x4   Musculoskeletal: Normal range of motion. He exhibits no edema.   Lymphadenopathy:     He has no cervical adenopathy.   Neurological: He is alert and oriented to person, place, and time.   Skin: Skin is warm, dry and intact. No rash noted.   Vitals reviewed.        Lab Review:   Lab Results   Component Value Date    GLUCOSE 140 (H) 04/30/2019    BUN 8 04/30/2019    CREATININE 1.04 04/30/2019    EGFRIFNONA 70 04/30/2019    EGFRIFAFRI 67 09/28/2018    BCR 7.7 04/30/2019    CO2 30.0 (H) 04/30/2019    CALCIUM 9.3 04/30/2019    PROTENTOTREF 6.7 09/28/2018    ALBUMIN 3.90 04/30/2019    LABIL2 2.2 09/28/2018    AST 21 04/30/2019    ALT 8 04/30/2019       Lab Results   Component Value Date    WBC 8.53 09/28/2018    HGB 12.8 (L) 09/28/2018    HCT 41.8 09/28/2018    MCV 85.8 09/28/2018     09/28/2018       Lab Results   Component Value  Date    HGBA1C 6.1 03/21/2019       Lab Results   Component Value Date    TSH 2.075 09/28/2018       Lab Results   Component Value Date    CHOL 185 03/13/2019    CHOL 151 06/16/2016     Lab Results   Component Value Date    TRIG 222 (H) 03/13/2019    TRIG 262 (H) 09/28/2018     Lab Results   Component Value Date    HDL 53 03/13/2019    HDL 53 09/28/2018     Lab Results   Component Value Date     03/13/2019     (H) 09/28/2018       Lab Results   Component Value Date    .0 (H) 10/12/2018   Echocardiogram, 03/06/2019:  · Left ventricular wall thickness is consistent with mild concentric hypertrophy.  · Left atrial cavity size is mild-to-moderately dilated.  · Moderate dilation of the aortic root and of the sinuses of Valsalva present.  · The following left ventricular wall segments are hypokinetic: apical lateral, basal inferolateral, mid inferolateral, apical inferior, basal inferoseptal, apex hypokinetic and basal inferoseptal. The following left ventricular wall segments are akinetic: basal inferior and mid inferior.  · The left ventricular cavity is mild-to-moderately dilated.  · Estimated EF = 40%.  · Left ventricular systolic function is moderately decreased.  · Left ventricular diastolic dysfunction (grade II) consistent with pseudonormalization.  · Right ventricular cavity is mildly dilated.  · There is mild calcification of the aortic valve mainly affecting the non coronary cusp(s).  · Mild mitral valve regurgitation is present.  · Normal right ventricular wall thickness, systolic function and septal motion noted with right ventricular cavity mildly dilated.  · The aortic valve exhibits sclerosis.  · No evidence of pulmonary hypertension is present.  · There is no evidence of pericardial effusion.     ZIO/XT monitor, February/March 2019:  Abnormal study with predominantly normal sinus rhythm with frequent atrial ectopy (10.5% of all heartbeats) with intermittent brief moderate-rapid atrial  fibrillation with trial of sotalol recommended.       ECG 4/30/2019: Atrial fibrillation, inferior myocardial infarction, probably old, abnormal ECG, 84 bpm,  ms,  ms  Assessment:     Overall continued acceptable course with no interim cardiopulmonary complaints with fair functional status. We will defer additional diagnostic or therapeutic intervention from a cardiac perspective at this time.  The patient has hypotension and I will discontinue his hydrochlorothiazide.  I encouraged the patient to continue monitoring his blood pressure and heart rate at home and to call us in 2 weeks with his readings as well as his symptoms.  He will need a repeat echocardiogram after his next appointment to reassess his EF on Entresto therapy.  He was unable to tolerate sotalol due to dizziness and gait disturbance.       Diagnosis Plan   1. Chronic combined systolic and diastolic congestive heart failure (CMS/HCC)   Patient will need an echocardiogram after his next appointment to reassess his EF, continue Entresto   2. Paroxysmal atrial fibrillation (CMS/HCC)   Stable, no recurrent palpitations, continue metoprolol and Eliquis   3. Essential hypertension   Discontinue hydrochlorothiazide and patient to monitor his blood pressure and heart rate at home and call us in 2 weeks with his readings as well as his symptoms          Plan:         1. Patient to continue current medications and close follow up with the above providers.  2. Tentative cardiology follow up in 2 months or patient may return sooner PRN.  3. Echocardiogram after next appointment in July 2019   4. Discontinue hydrochlorothiazide and patient to monitor his blood pressure and heart rate      Electronically signed by SARAH Harry, 05/09/19, 2:55 PM.

## 2019-05-15 NOTE — TELEPHONE ENCOUNTER
She would like a call back from Deborah about an EKG 12 lead that was off the chart. Pt wife is worried because bad heart and when they saw the cardiology, nothing was mentioned. Please advise.

## 2019-05-15 NOTE — TELEPHONE ENCOUNTER
Spoke to wife.  Patient did not take lasix due to fear of having to use bathroom.  Advised patient to call cardiology office and follow-up with them.  He was recently seen at their office.  She also stated that they wanted a referral to a new cardiologist if possible.

## 2019-08-12 PROBLEM — I51.89 DIASTOLIC DYSFUNCTION: Status: ACTIVE | Noted: 2019-01-01

## 2019-08-22 NOTE — TELEPHONE ENCOUNTER
This Rx was sent in from Cardiology. It also had 11 additional refills on it from this year.   Would you like to take over Rx?

## 2019-08-22 NOTE — TELEPHONE ENCOUNTER
Please let patient know that his cardiologist is the provider prescribing this and he would need to contact their office to change prescription to 90 days from 30 days.

## 2019-08-29 PROBLEM — E11.22 TYPE 2 DIABETES MELLITUS WITH STAGE 1 CHRONIC KIDNEY DISEASE, WITHOUT LONG-TERM CURRENT USE OF INSULIN (HCC): Status: RESOLVED | Noted: 2018-10-11 | Resolved: 2019-01-01

## 2019-08-29 PROBLEM — N18.1 TYPE 2 DIABETES MELLITUS WITH STAGE 1 CHRONIC KIDNEY DISEASE, WITHOUT LONG-TERM CURRENT USE OF INSULIN (HCC): Status: RESOLVED | Noted: 2018-10-11 | Resolved: 2019-01-01

## 2019-08-29 NOTE — PROGRESS NOTES
Chief Complaint   Patient presents with   • Diabetes     f/u - last A1C back in March. Pt claims he does not have diabetes    • Groin Swelling     no pain or injury . sx started 3 months been the same        HPI     Fidencio Zarate Jr. is a pleasant 73 y.o. male who is here for routine follow-up of diabetes type 2, hypertension, hyperlipidemia, hypothyroidism, paroxysmal atrial fibrillation, depression, chronic back pain, acute foul smelling urine and right testicular swelling.  Patient has history of hemoglobin AIC 6.7%.  His last 2 hemoglobin AIC values have been much improved since he started watching diet and taking cinnamon.  Patient is compliant on all medications.  He was recently seen by a new cardiologist at  who is considering a pacemaker or defibrillator, patient is unsure of which.  He is compliant on eliquis 5 mg BID.  His blood pressure is stable and well-controlled.  He is on crestor 40 mg QPM.  He has chronic back pain and is followed by pain management.  Recent back injections have not helped with pain, knee injections were rejected by insurance.  Dr. Aguilar manages his pain.  He is on percocet and gabapentin.  He has ongoing depression that is well-managed with celexa 20 mg QD.  He does report that his depression seems worse when his pain is severe.  No suicidal ideation.  He reports new onset foul smelling urine and swelling in his right testicle.  He denies injury, lesions or pain.     Past Medical History:   Diagnosis Date   • Arthritis    • Cancer (CMS/HCC)     thoat cancer   • Coronary artery disease    • Gout    • History of chemotherapy    • Hyperlipidemia    • Hypertension    • Injury of back    • Low back pain    • Myocardial infarction (CMS/HCC)     x2   • PAF (paroxysmal atrial fibrillation) (CMS/HCC)    • Rotator cuff rupture    • Uses hearing aid     bilat        Past Surgical History:   Procedure Laterality Date   • APPENDECTOMY     • CARDIAC CATHETERIZATION      x1 stent   •  "CARDIAC CATHETERIZATION N/A 2016    Procedure: Left Heart Cath;  Surgeon: Son Lam MD;  Location:  [facility];  Service:    • CARDIAC SURGERY     • CATARACT EXTRACTION     • CHOLECYSTECTOMY LAPAROSCOPIC INTRAOPERATIVE CHOLANGIOGRAM WITH LIVER BIOPSY N/A 2016    Procedure: CHOLECYSTECTOMY LAPAROSCOPIC WITH LIVER BIOPSY;  Surgeon: Son Andrade MD;  Location:  [facility];  Service:    • COLONOSCOPY      x2   • CORONARY ARTERY BYPASS GRAFT     • FOOT SURGERY      right    • FRACTURE SURGERY     • LASIK     • MO ERCP DX COLLECTION SPECIMEN BRUSHING/WASHING N/A 2016    Procedure: ENDOSCOPIC RETROGRADE CHOLANGIOPANCREATOGRAPHY;  Surgeon: Omari Cardenas MD;  Location:  [facility];  Service: Gastroenterology   • TONSILLECTOMY     • WISDOM TOOTH EXTRACTION         Family History   Problem Relation Age of Onset   • COPD Mother    • Depression Mother    • Hypertension Mother        Social History     Socioeconomic History   • Marital status:      Spouse name: Not on file   • Number of children: Not on file   • Years of education: Not on file   • Highest education level: Not on file   Tobacco Use   • Smoking status: Former Smoker     Packs/day: 1.50     Years: 50.00     Pack years: 75.00     Types: Cigarettes     Last attempt to quit: 6/15/2010     Years since quittin.2   • Smokeless tobacco: Never Used   Substance and Sexual Activity   • Alcohol use: Yes     Comment: beer occasional   • Drug use: No   • Sexual activity: Defer   Social History Narrative    Patient consumes 4 diet pepsi sodas daily.     Patient lives at home with wife.        Allergies   Allergen Reactions   • Penicillins Rash and Other (See Comments)     \"flu-like\" symptoms   • Lipitor [Atorvastatin] Other (See Comments) and Myalgia     Arthralgias   • Metformin Nausea Only   • Sotalol Dizziness   • Zocor [Simvastatin] Other (See Comments) and Myalgia     Arthralgias " "      ROS    Review of Systems   Constitutional: Positive for fatigue. Negative for chills, diaphoresis and fever.   HENT: Negative for congestion, postnasal drip and rhinorrhea.    Respiratory: Negative for cough, shortness of breath and wheezing.    Cardiovascular: Negative for chest pain and leg swelling.   Gastrointestinal: Negative for abdominal pain, blood in stool and nausea.   Genitourinary: Positive for scrotal swelling. Negative for discharge, dysuria, flank pain, genital sores, hematuria, penile pain, penile swelling and testicular pain.   Musculoskeletal: Positive for arthralgias, back pain and myalgias.   Skin: Negative for rash and skin lesions.   Neurological: Negative for dizziness, light-headedness and headache.   Psychiatric/Behavioral: Positive for sleep disturbance (due to pain), depressed mood and stress. Negative for self-injury and suicidal ideas. The patient is not nervous/anxious.        Vitals:    08/29/19 1352   BP: 134/80   Pulse: 71   SpO2: 97%   Weight: 92.5 kg (204 lb)   Height: 175.3 cm (69\")       Current Outpatient Medications on File Prior to Visit   Medication Sig Dispense Refill   • allopurinol (ZYLOPRIM) 300 MG tablet Take 1 tablet by mouth Daily. 90 tablet 3   • apixaban (ELIQUIS) 5 MG tablet tablet Take 1 tablet by mouth Every 12 (Twelve) Hours. 180 tablet 3   • cholecalciferol (VITAMIN D3) 64008 units capsule Take 1 capsule by mouth 1 (One) Time Per Week. (Patient taking differently: Take 1,000 Units by mouth Daily.) 12 capsule 1   • CINNAMON PO Take 1,000 mg by mouth Daily.     • citalopram (CeleXA) 20 MG tablet Take 1 tablet by mouth Daily. 90 tablet 3   • gabapentin (NEURONTIN) 600 MG tablet 600 mg 3 (Three) Times a Day.     • hydrochlorothiazide (HYDRODIURIL) 25 MG tablet Take 25 mg by mouth Daily.     • levothyroxine (SYNTHROID, LEVOTHROID) 75 MCG tablet Take 1 tablet by mouth Daily. (Patient taking differently: Take 50 mcg by mouth Daily.) 90 tablet 3   • LUBRICANT EYE " DROPS 0.4-0.3 % solution ophthalmic solution Administer 1 drop to both eyes at night as needed.     • nitroglycerin (NITROSTAT) 0.4 MG SL tablet Place 1 tablet under the tongue As Needed for Chest Pain. 10 tablet 11   • omeprazole (priLOSEC) 40 MG capsule Take 1 capsule by mouth Daily. 90 capsule 1   • oxyCODONE-acetaminophen (PERCOCET) 7.5-325 MG per tablet      • rosuvastatin (CRESTOR) 40 MG tablet Take 1 tablet by mouth Daily. 30 tablet 11   • sacubitril-valsartan (ENTRESTO) 24-26 MG tablet Take 1 tablet by mouth 2 (Two) Times a Day. 60 tablet 11   • tamsulosin (FLOMAX) 0.4 MG capsule 24 hr capsule Take 1 capsule by mouth Every Night. 90 capsule 3     No current facility-administered medications on file prior to visit.        Results for orders placed or performed in visit on 08/29/19   POC Glycosylated Hemoglobin (Hb A1C)   Result Value Ref Range    Hemoglobin A1C 5.5 %   POC Urinalysis Dipstick, Automated   Result Value Ref Range    Color Yellow Yellow, Straw, Dark Yellow, Guillermina    Clarity, UA Clear Clear    Specific Gravity  1.015 1.005 - 1.030    pH, Urine 6.0 5.0 - 8.0    Leukocytes Negative Negative    Nitrite, UA Negative Negative    Protein, POC 1+ (A) Negative mg/dL    Glucose, UA Negative Negative, 1000 mg/dL (3+) mg/dL    Ketones, UA Negative Negative    Urobilinogen, UA Normal Normal    Bilirubin Negative Negative    Blood, UA 1+ (A) Negative       PE    Physical Exam   Constitutional: He appears well-developed and well-nourished. He is active and cooperative. No distress.   HENT:   Head: Normocephalic and atraumatic.   Eyes: EOM are normal.   Neck: Normal range of motion.   Cardiovascular: Normal rate, regular rhythm and normal heart sounds.   Pulmonary/Chest: Effort normal and breath sounds normal.   Genitourinary: Penis normal. Right testis shows swelling. Right testis shows no mass and no tenderness. Left testis shows no mass, no swelling and no tenderness.   Musculoskeletal: Normal range of  motion. He exhibits no edema.   Neurological: He is alert.   Skin: Skin is warm. He is not diaphoretic. No erythema.   Psychiatric: He has a normal mood and affect. His speech is normal and behavior is normal. Judgment and thought content normal. He is not actively hallucinating. He is attentive.   Vitals reviewed.      A/P    Fidencio was seen today for diabetes and groin swelling.    Diagnoses and all orders for this visit:    Type 2 diabetes mellitus with stage 1 chronic kidney disease, without long-term current use of insulin (CMS/HCC)  -elevated at 6.7% approximately 11 months ago  -patient has changed his diet and started taking cinnamon  -hemoglobin AIC is 5.5% today  -not on any other medications for diabetes  -no need to monitor closely, will check glucose on routine APE  -     POC Glycosylated Hemoglobin (Hb A1C)    Essential hypertension  -stable, well-controlled  -compliant on medications  -started seeing a new cardiologist at , pending records  -possible pacemaker vs. Defibrillator? Per patient  -     Comprehensive Metabolic Panel; Future    Chronic midline low back pain without sciatica  -on percocet and gabapentin  -recent injections did not help pain  -followed by Dr. Aguilar with pain management    Hypothyroidism, unspecified type  -on levothyroxine 50 mcg daily  -     TSH; Future    Hyperlipidemia, unspecified hyperlipidemia type  -on crestor 40 mg QPM    Paroxysmal atrial fibrillation (CMS/HCC)  -on eliquis 5 mg BID    Depression, unspecified depression type  -stable, worse when he has considerable pain  -compliant on celexa 20 mg QD  -no suicidal ideation    Foul smelling urine  -started about 3 months ago with testicular swelling  -will order urinalysis, shows no leukocytes but does show hematuria  -send for culture  -     POC Urinalysis Dipstick, Automated  -     Urine Culture - Urine, Urine, Clean Catch    Testicular swelling, right  -noticed swelling in right testicle 3 months ago  -denies any  other symptoms including mass or pain  -exam indicates possible hydrocele  -will order ultrasound of testicles and scrotum  -pending ultrasound will determine if patient needs referral to urology  -     US Scrotum & Testicles; Future         Plan of care reviewed with patient at the conclusion of today's visit. Education was provided regarding diagnosis, management and any prescribed or recommended OTC medications.  Patient verbalizes understanding of and agreement with management plan.    Return in about 3 months (around 11/29/2019) for Medicare Wellness.     Deborah Henson PA-C

## 2019-09-04 NOTE — TELEPHONE ENCOUNTER
----- Message from Oralia Mcrae MD sent at 9/4/2019  9:45 AM EDT -----  Urine culture shows no infection.    Notes recorded by Marimar Miller MA on 9/4/2019 at 10:11 AM EDT  LVM for pt to return call  ------

## 2019-09-06 NOTE — TELEPHONE ENCOUNTER
3rd attempt to contact patient.   LVM explaining labs were normal. Office # given incase patient has any further questions.

## 2019-09-16 NOTE — TELEPHONE ENCOUNTER
----- Message from Deborah Henson PA-C sent at 9/16/2019  4:10 PM EDT -----  Please call patient and let him know that his testicular/scrotal ultrasound is normal.  Appears he has small hydroceles, right greater than left.  Nothing to do for this.  Let us know if he has worsening swelling or pain.  Overall this looks good - will send him a copy of his results.

## 2019-09-24 NOTE — PROGRESS NOTES
Chief Complaint   Patient presents with   • Night Sweats     thinks this is due to levothyroxine        HPI      Fidencio Zarate Jr. is a 73 y.o. male who presents for Night Sweats (thinks this is due to levothyroxine )    Patient discontinued his levothyroxine 75 mcg and reports resolution of night sweats.  He was started on this a few years ago, recent TSH labs all look normal.  He denies any symptoms with discontinuation.  Will repeat labs in a few weeks after he has been off of medication.    He has established with a new cardiologist, Dr. Cervantes, and is pleased with him.  His blood pressure is stable and well-controlled today.  He is compliant on his eliquis and entresto.  He has been discontinued from diuretics and denies any issues with leg swelling or shortness of breath.    He recently had a urinalysis showing blood.  Denies any urinary symptoms.  History of smoking.  Also on eliquis.  Will repeat urine today.    Patient is followed by pain management, Dr. Aguilar.  He is unable to afford injection gel that he ordered and has next appointment on October 15th.  Patient reports that Dr. Aguilar mentioned starting him on Morphine in addition to his gabapentin and percocet.    Patient reports feeling like something is stuck in his throat.  He has also noticed hoarseness.  He is not taking anything for allergies.  Patient had larynx cancer in 2016 treated with radiation, not followed by anyone anymore.  He was originally seen and managed by Dr. Solano.  Wife is present at appointment.    Past Medical History:   Diagnosis Date   • Arthritis    • Cancer (CMS/HCC)     thoat cancer   • Coronary artery disease    • Gout    • History of chemotherapy    • Hyperlipidemia    • Hypertension    • Injury of back    • Low back pain    • Myocardial infarction (CMS/HCC)     x2   • PAF (paroxysmal atrial fibrillation) (CMS/HCC)    • Rotator cuff rupture    • Uses hearing aid     bilat        Past Surgical History:  "  Procedure Laterality Date   • APPENDECTOMY     • CARDIAC CATHETERIZATION      x1 stent   • CARDIAC CATHETERIZATION N/A 2016    Procedure: Left Heart Cath;  Surgeon: Son Lam MD;  Location: [facility] CATH INVASIVE LOCATION;  Service:    • CARDIAC SURGERY     • CATARACT EXTRACTION     • CHOLECYSTECTOMY LAPAROSCOPIC INTRAOPERATIVE CHOLANGIOGRAM WITH LIVER BIOPSY N/A 2016    Procedure: CHOLECYSTECTOMY LAPAROSCOPIC WITH LIVER BIOPSY;  Surgeon: Son Andrade MD;  Location:  [facility];  Service:    • COLONOSCOPY      x2   • CORONARY ARTERY BYPASS GRAFT     • FOOT SURGERY      right    • FRACTURE SURGERY     • LASIK     • [place] ERCP DX COLLECTION SPECIMEN BRUSHING/WASHING N/A 2016    Procedure: ENDOSCOPIC RETROGRADE CHOLANGIOPANCREATOGRAPHY;  Surgeon: Omari Cardenas MD;  Location: [facility] END[facility];  Service: Gastroenterology   • TONSILLECTOMY     • WISDOM TOOTH EXTRACTION         Family History   Problem Relation Age of Onset   • COPD Mother    • Depression Mother    • Hypertension Mother        Social History     Socioeconomic History   • Marital status:      Spouse name: Not on file   • Number of children: Not on file   • Years of education: Not on file   • Highest education level: Not on file   Tobacco Use   • Smoking status: Former Smoker     Packs/day: 1.50     Years: 50.00     Pack years: 75.00     Types: Cigarettes     Last attempt to quit: 6/15/2010     Years since quittin.2   • Smokeless tobacco: Never Used   Substance and Sexual Activity   • Alcohol use: Yes     Comment: beer occasional   • Drug use: No   • Sexual activity: Defer   Social History Narrative    Patient consumes 4 diet pepsi sodas daily.     Patient lives at home with wife.        Allergies   Allergen Reactions   • Penicillins Rash and Other (See Comments)     \"flu-like\" symptoms   • Lipitor [Atorvastatin] Other (See Comments) and Myalgia     Arthralgias   • Metformin Nausea Only   • Sotalol " "Dizziness   • Zocor [Simvastatin] Other (See Comments) and Myalgia     Arthralgias       ROS    Review of Systems   Constitutional: Negative for chills, diaphoresis, fatigue and fever.   HENT: Positive for postnasal drip, trouble swallowing and voice change. Negative for congestion, ear pain, rhinorrhea and sore throat.    Respiratory: Negative for cough, shortness of breath and wheezing.    Cardiovascular: Negative for chest pain, palpitations and leg swelling.   Genitourinary: Positive for hematuria. Negative for dysuria, flank pain, frequency, nocturia and urgency.   Musculoskeletal: Positive for arthralgias, back pain, gait problem and joint swelling.   Neurological: Negative for dizziness and headache.   Psychiatric/Behavioral: Negative for self-injury, sleep disturbance, suicidal ideas, depressed mood and stress. The patient is not nervous/anxious.        Vitals:    09/24/19 1456   BP: 138/84   Pulse: 77   SpO2: 96%   Weight: 92.5 kg (204 lb)   Height: 175.3 cm (69\")       Current Outpatient Medications on File Prior to Visit   Medication Sig Dispense Refill   • allopurinol (ZYLOPRIM) 300 MG tablet Take 1 tablet by mouth Daily. 90 tablet 3   • apixaban (ELIQUIS) 5 MG tablet tablet Take 1 tablet by mouth Every 12 (Twelve) Hours. 180 tablet 3   • cholecalciferol (VITAMIN D3) 53289 units capsule Take 1 capsule by mouth 1 (One) Time Per Week. (Patient taking differently: Take 1,000 Units by mouth Daily.) 12 capsule 1   • CINNAMON PO Take 1,000 mg by mouth Daily.     • citalopram (CeleXA) 20 MG tablet Take 1 tablet by mouth Daily. 90 tablet 3   • gabapentin (NEURONTIN) 600 MG tablet 600 mg 3 (Three) Times a Day.     • LUBRICANT EYE DROPS 0.4-0.3 % solution ophthalmic solution Administer 1 drop to both eyes at night as needed.     • nitroglycerin (NITROSTAT) 0.4 MG SL tablet Place 1 tablet under the tongue As Needed for Chest Pain. 10 tablet 11   • omeprazole (priLOSEC) 40 MG capsule Take 1 capsule by mouth Daily. " 90 capsule 1   • oxyCODONE-acetaminophen (PERCOCET) 7.5-325 MG per tablet      • rosuvastatin (CRESTOR) 40 MG tablet Take 1 tablet by mouth Daily. 30 tablet 11   • sacubitril-valsartan (ENTRESTO) 24-26 MG tablet Take 1 tablet by mouth 2 (Two) Times a Day. 60 tablet 11   • tamsulosin (FLOMAX) 0.4 MG capsule 24 hr capsule Take 1 capsule by mouth Every Night. 90 capsule 3   • [DISCONTINUED] hydrochlorothiazide (HYDRODIURIL) 25 MG tablet Take 25 mg by mouth Daily.     • [DISCONTINUED] levothyroxine (SYNTHROID, LEVOTHROID) 75 MCG tablet Take 1 tablet by mouth Daily. (Patient taking differently: Take 50 mcg by mouth Daily.) 90 tablet 3     No current facility-administered medications on file prior to visit.        Results for orders placed or performed in visit on 09/24/19   POCT urinalysis dipstick, automated   Result Value Ref Range    Color Yellow Yellow, Straw, Dark Yellow, Guillermina    Clarity, UA Clear Clear    Specific Gravity  1.015 1.005 - 1.030    pH, Urine 6.0 5.0 - 8.0    Leukocytes Negative Negative    Nitrite, UA Negative Negative    Protein, POC 1+ (A) Negative mg/dL    Glucose, UA Negative Negative, 1000 mg/dL (3+) mg/dL    Ketones, UA Negative Negative    Urobilinogen, UA 12 E.U./dL  (A) Normal    Bilirubin Negative Negative    Blood, UA Trace (A) Negative       PE    Physical Exam   Constitutional: Vital signs are normal. He appears well-developed and well-nourished. He is active and cooperative. He does not appear ill. No distress. He is not overweight.  HENT:   Head: Normocephalic and atraumatic.   Eyes: EOM are normal.   Neck: Normal range of motion.   Cardiovascular: Normal rate and regular rhythm.   Murmur heard.  Pulmonary/Chest: Effort normal and breath sounds normal.   Musculoskeletal: Normal range of motion. He exhibits no edema.   Antalgic gait secondary to pain.   Neurological: He is alert.   Skin: Skin is warm. He is not diaphoretic. No erythema.   Psychiatric: He has a normal mood and affect.  His speech is normal and behavior is normal. Judgment and thought content normal. He is not actively hallucinating. He is attentive.        SHAILESH/KRAMA Nicolas was seen today for night sweats.    Diagnoses and all orders for this visit:    Essential hypertension  -stable, well-controlled  -followed by cardiology    Hypothyroidism, unspecified type  -patient was taking levothyroxine 75 mcg daily for years  -was having night sweats and these resolved with discontinuation of medicine  -discussed the symptoms of hypothyroidism, patient denies any of these  -return in 4-6 weeks to have repeat TSH now that he has discontinued medication  -     TSH Rfx On Abnormal To Free T4; Future    Hyperlipidemia, unspecified hyperlipidemia type  -on rosuvastatin 40 mg QD  -check CMP at 6 month interval which will be in 4-6 weeks  -     Comprehensive Metabolic Panel; Future    Malignant neoplasm of larynx (CMS/HCC)  -diagnosed in 2016 by Dr. Solano  -treated with 35 treatments of radiation  -reports hoarseness and feeling like something is stuck in his throat  -on exam, appears to have post-nasal drip  -recommend following up with Dr. Solano and starting allegra or xyzal    Hoarseness    Dysphagia, unspecified type    Hematuria, unspecified type  -on eliquis for PAF  -blood appreciated on urine dip, will send for microscopic  -may need referral to urology if hematuria persists  -has history of cigarette smoking  -     Urinalysis With Microscopic - Urine, Clean Catch; Future  -     POCT urinalysis dipstick, automated         Plan of care reviewed with patient at the conclusion of today's visit. Education was provided regarding diagnosis, management and any prescribed or recommended OTC medications.  Patient verbalizes understanding of and agreement with management plan.    No Follow-up on file.     Deborah Henson PA-C

## 2019-09-25 NOTE — TELEPHONE ENCOUNTER
----- Message from Deborah Henson PA-C sent at 9/25/2019 10:06 AM EDT -----  Please let patient know that he has no blood in his urine.  The microscopic urinalysis didn't show anything.  This is reassuring and I don't recommend any other testing/referrals at this time.  Please have patient hydrate well with water.  Ideally, urine should be a pale yellow color.

## 2019-10-19 PROBLEM — N17.9 ACUTE KIDNEY FAILURE (HCC): Status: ACTIVE | Noted: 2019-01-01

## 2019-10-19 PROBLEM — E87.1 HYPONATREMIA: Status: ACTIVE | Noted: 2019-01-01

## 2019-10-19 PROBLEM — M10.9 GOUT: Status: RESOLVED | Noted: 2018-05-16 | Resolved: 2019-01-01

## 2019-10-19 PROBLEM — K92.2 GI BLEED: Status: ACTIVE | Noted: 2019-01-01

## 2019-10-19 PROBLEM — I50.43 ACUTE ON CHRONIC COMBINED SYSTOLIC AND DIASTOLIC CONGESTIVE HEART FAILURE (HCC): Status: ACTIVE | Noted: 2019-03-08

## 2019-10-19 PROBLEM — I21.4 NSTEMI (NON-ST ELEVATED MYOCARDIAL INFARCTION) (HCC): Status: ACTIVE | Noted: 2019-01-01

## 2019-10-19 PROBLEM — D50.0 ANEMIA DUE TO GI BLOOD LOSS: Status: ACTIVE | Noted: 2019-01-01

## 2019-10-19 PROBLEM — I21.4 NON-ST ELEVATION MI (NSTEMI) (HCC): Status: ACTIVE | Noted: 2019-01-01

## 2019-10-19 PROBLEM — R41.82 ALTERED MENTAL STATE: Status: ACTIVE | Noted: 2019-01-01

## 2019-10-19 PROBLEM — F06.0 PSYCHOTIC DISORDER DUE TO MEDICAL CONDITION WITH HALLUCINATIONS: Status: ACTIVE | Noted: 2019-01-01

## 2019-10-19 PROBLEM — I50.9 ACUTE HEART FAILURE (HCC): Status: RESOLVED | Noted: 2018-10-11 | Resolved: 2019-01-01

## 2019-10-19 NOTE — CONSULTS
"Consults     [facility] at [facility]  Cardiovascular Consultation Note    Reason for consult: Non-STEMI       Patient is a 73-year-old male with a history of paroxysmal atrial fibrillation, systolic heart failure, coronary artery disease status post CABG and PCI, hypertension and hyperlipidemia that we are being asked to consult for elevated troponin greater than 6.0.  The patient presented to [facility] emergency room earlier today with complaints of confusion, hallucinations, nonproductive cough, and gurgling when lying flat.  He was hypotensive on arrival and was treated with IV fluids.  He remains hypotensive with a systolic in the 90s.  He ruled in for non-STEMI with troponin greater than 6.0 that occurred in the setting of acute renal failure with a creatinine of 3 and likely GI bleed with a positive fecal occult stool.  His H&H is decreased at 8.3 and 27.9.  He is sitting up on the stretcher and appears oriented and answers questions appropriately.  He reports seeing things that are not there.  He is also been more short of breath than normal.  He is unable to lie flat without \"gurgling\".  He has no previous kidney issues.  He is chronically anticoagulated with Eliquis for his atrial fibrillation.  He denies any chest pain but has been having indigestion/acid reflux type symptoms for the last couple of weeks.  His EKG has no ST/T wave changes concerning for ischemia but showed controlled trial fibrillation.      Cardiac risk factors: Known CAD, hypertension, hyperlipidemia, advanced age    Past medical and surgical history, social and family history reviewed in EMR.    REVIEW OF SYSTEMS:   H&P ROS reviewed and pertinent CV ROS as noted in HPI.         Vital Sign Min/Max for last 24 hours  Temp  Min: 97.4 °F (36.3 °C)  Max: 98 °F (36.7 °C)   BP  Min: 69/46  Max: 102/53   Pulse  Min: 62  Max: 74   Resp  Min: 16  Max: 18   SpO2  Min: 92 %  Max: 97 %   No Data Recorded    "   Intake/Output Summary (Last 24 hours) at 10/19/2019 1318  Last data filed at 10/19/2019 0100  Gross per 24 hour   Intake 1000 ml   Output --   Net 1000 ml           Physical Exam   Constitutional: He is oriented to person, place, and time. He appears well-developed and well-nourished.   HENT:   Head: Normocephalic and atraumatic.   Eyes: Pupils are equal, round, and reactive to light. No scleral icterus.   Neck: No JVD present. Carotid bruit is not present. No thyromegaly present.   Cardiovascular: Normal rate and regular rhythm. Exam reveals no gallop.   No murmur heard.  Appears to be alternating between sinus rhythm and rate controlled atrial fibrillation   Pulmonary/Chest: Effort normal. He has decreased breath sounds.   Abdominal: Soft. He exhibits no distension. There is no hepatosplenomegaly.   Musculoskeletal: He exhibits edema.   Neurological: He is alert and oriented to person, place, and time.   Skin: Skin is warm and dry.   Psychiatric: He has a normal mood and affect. His behavior is normal.       EKG: Rate controlled atrial fibrillation 10/19/2019    Lab Review:   Labs reviewed in the electronic medical record.  Pertinent findings include:  Lab Results   Component Value Date    GLUCOSE 106 (H) 10/19/2019    BUN 27 (H) 10/19/2019    CREATININE 3.08 (H) 10/19/2019    EGFRIFNONA 20 (L) 10/19/2019    EGFRIFAFRI 67 09/28/2018    BCR 8.8 10/19/2019    K 3.2 (L) 10/19/2019    CO2 27.0 10/19/2019    CALCIUM 8.5 (L) 10/19/2019    PROTENTOTREF 7.2 04/30/2019    ALBUMIN 3.60 10/19/2019    LABIL2 1.2 04/30/2019    AST 69 (H) 10/19/2019    ALT 26 10/19/2019     Lab Results   Component Value Date    WBC 6.21 10/19/2019    HGB 8.3 (L) 10/19/2019    HCT 27.9 (L) 10/19/2019    MCV 83.8 10/19/2019     10/19/2019     Lab Results   Component Value Date    CHOL 185 03/13/2019    CHLPL 220 (H) 09/28/2018    TRIG 222 (H) 03/13/2019    HDL 53 03/13/2019     03/13/2019                Active Hospital Problems     Diagnosis   • **Suspected GI bleed   • NSTEMI (non-ST elevated myocardial infarction) (CMS/Formerly [facility])     · Presentation [facility] 10/19/2019 troponin of 7 in the setting of GI bleed, anemia and elevated creatinine     • Acute on chronic combined systolic and diastolic congestive heart failure (CMS/HCC)     · Echo (10/6/2016): LVEF 50%  · Echo (3/6/2019): Reduced LVEF 40%.  Grade 2 diastolic dysfunction.  Moderate dilation of the aortic root at the sinuses of Valsalva present. Left ventricular wall segments are hypokinetic: apical lateral, basal inferolateral, mid inferolateral, apical inferior, basal inferoseptal, apex hypokinetic and basal inferoseptal. The following left ventricular wall segments are akinetic: basal inferior and mid inferior.  · Presentation to [facility] 10/19/2019 with elevated proBNP in setting of GI bleed, acute kidney injury with elevated creatinine and non-STEMI     • Paroxysmal atrial fibrillation (CMS/Former[facility])     · Diagnosed 09/2016.  · Chadsvasc=5  · 2-week monitor (03/2019): SVT versus atrial tachycardia.  10% burden PACs  · Intolerant to sotalol due to dizziness, placed on metoprolol  · Chronic anticoagulation with Eliquis  · Presented to [facility] with GI bleed, anemia and acute kidney failure on 10/19/2019 and EKG showed rate controlled atrial fibrillation     • Coronary artery disease involving native coronary artery of native heart     · Cardiac cath for acute MI (10/1984): Severe single-vessel CAD with preserved LVEF.  Data deficit  · Cardiac cath for recurrent MI (1989): Multivessel disease.  Referred for CABG  · CABG (01/1990) reportedly CABG x6.  Data deficit  · Cardiac cath for recurrent angina at [facility] (05/2007): PTCA/stent placement to SVG of the diagonal.  Incomplete visualization of LIMA to LAD.  Occluded SVG to circumflex.  Patent SVG to distal PDA.  High grade stenosis of the vein graft of the first diagonal branch of the  left anterior descending artery with occluded vein graft to the left circumflex coronary artery with patent graft to the distal PDA  · Echo (2009): Normal LVEF 55%.  · Cardiac cath (2007): Patent LIMA to LAD.  Patent stents and vein graft of D1 to LAD.  Patent SVG to RCA.  Chronically occluded SVG to circumflex with adequate right to left collaterals.  Unchanged from prior study  · Cardiac cath (6/17/2016): Three-vessel CAD with occlusion of all coronary arteries and patent LIMA and patent SVG x2.  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  EKG has no ST/T wave changes concerning for acute ischemia.  It is unclear if his non-STEMI is a type I or type II due to demand ischemia from acute kidney failure and anemia from likely GI bleed.  We will repeat an echocardiogram.  He is hypotensive so we will hold all antihypertensive medications.  We will also hold his Eliquis due to anemia but start an 81 mg aspirin.  We will hold his Entresto due to his elevated creatinine.  We will trend his troponins.       · Obtain echocardiogram  · Trend troponins  · Hold all antihypertensives  · Hold Eliquis but start aspirin 81 mg daily  · Hold Entresto and other nephrotoxic medications  · Daily weights and strict I's and O's  · Will need GI and nephrology consult  · Will likely need an eventual ischemic evaluation but will defer for now until anemia and acute renal failure resolved    SARAH Juárez scribmarcos for Ayaan Tyler M.D., personally performed the services described in this documentation as scribed by the above named individual in my presence,  and it is both accurate and complete.    Ayaan Brito MD, [facility]  10/19/19  6:39 PM

## 2019-10-19 NOTE — PLAN OF CARE
Problem: Patient Care Overview  Goal: Plan of Care Review  Outcome: Ongoing (interventions implemented as appropriate)   10/19/19 1723   Coping/Psychosocial   Plan of Care Reviewed With patient;spouse   Coping/Psychosocial   Patient Agreement with Plan of Care agrees   Plan of Care Review   Progress improving   OTHER   Outcome Summary Pt admitted to ICU from ED for evaluation of NSTEMI and possible GIB. Renal consult done: maintenance fluids started, midodrine started, uop 400ml, post void residual 550ml, I&O cathed, renal U/S ordered, blood cultures obtained. GI consult pending. Protonix gtt started, 1 unit blood on hold, q6h H&H, transfuse for hemoglobin <7. Cardiology consult, initial troponin 6.9 trending down to 6.1. Echo ordered. May eventually need ischemic evaluation.      Goal: Individualization and Mutuality  Outcome: Ongoing (interventions implemented as appropriate)    Goal: Discharge Needs Assessment  Outcome: Ongoing (interventions implemented as appropriate)    Goal: Interprofessional Rounds/Family Conf  Outcome: Ongoing (interventions implemented as appropriate)

## 2019-10-19 NOTE — H&P
"                                                         Critical Care History & Physical    Patient name: Fidencio Zarate Jr.  CSN: [id number]  MRN: [id number]  : 1945  Today's date: 10/19/2019    Primary Care Physician: Deborah Henson PA-C    Chief complaint:  Hallucinations, confusion, cough, abd cramps and excessive thirst    HPI: Fidencio Zarate Jr. is a 73 y.o. male with PMH PAF, Mixed HF, HTN, HLP who presented to [facility] ED today with 2-3 week c/o abdominal cramps and 4-5 days c/o dry nonproductive cough, \"gurgling\" when laying down, confusion, hallucinations and excessive thirst.  He was watching a TV show on [employer] and thought he was doing it so he reached up to grab the cable.  He has also seen a man in his mirror that is watching TV with him.  He disappears when he talks to him.  He has been seeing a cat running across the floor as well.  His confusion has been the same whether night or day.  However, one evening about 4 days ago, he had an episode where he was extremely weak and lethargic.  His wife stated he was \"dead weight\" and he didn't acknowledge her.  He did take one dose of morphine at home 1-2 days ago and does have a h/o hallucinations with it.   He states that he has been having intermittent abdominal cramps as well for about 2-3 weeks now.  He does not associate them with eating but he has had diarrhea.  He states that he has had excessive thirst.  His wife states that he is \"constantly\" drinking water.  He quit drinking and smoking about 10 years ago.  He has never had liver issues or been told that he has liver disease.      He takes Eliquis for PAF and follows with Dr. Sommers.  He was found to have a positive Hemoccult in the emergency room and has decreased his H&H compared to earlier.  He has significant elevation in creatinine compared to earlier studies.  His BNP is markedly elevated compared to earlier.    Past Medical History:   Diagnosis Date   • Arthritis  "   • Cancer (CMS/HCC)     thoat cancer, s/p chemo and XRT   • Coronary artery disease    • Gout    • History of chemotherapy    • Hyperlipidemia    • Hypertension    • Injury of back    • Low back pain    • Myocardial infarction (CMS/HCC)     x2   • PAF (paroxysmal atrial fibrillation) (CMS/HCC)    • Rotator cuff rupture    • Uses hearing aid     bilat        Past Surgical History:   Procedure Laterality Date   • APPENDECTOMY     • CARDIAC CATHETERIZATION      x1 stent   • CARDIAC CATHETERIZATION N/A 2016    Procedure: Left Heart Cath;  Surgeon: Son Lam MD;  Location:  [facility] CATH INVASIVE LOCATION;  Service:    • CARDIAC SURGERY     • CATARACT EXTRACTION     • CHOLECYSTECTOMY LAPAROSCOPIC INTRAOPERATIVE CHOLANGIOGRAM WITH LIVER BIOPSY N/A 2016    Procedure: CHOLECYSTECTOMY LAPAROSCOPIC WITH LIVER BIOPSY;  Surgeon: Son Andrade MD;  Location:  [facility] OR;  Service:    • COLONOSCOPY      x2   • CORONARY ARTERY BYPASS GRAFT     • FOOT SURGERY      right    • FRACTURE SURGERY     • LASIK     • NV ERCP DX COLLECTION SPECIMEN BRUSHING/WASHING N/A 2016    Procedure: ENDOSCOPIC RETROGRADE CHOLANGIOPANCREATOGRAPHY;  Surgeon: Omari Cardenas MD;  Location:  [facility] ENDOSCOPY;  Service: Gastroenterology   • TONSILLECTOMY     • WISDOM TOOTH EXTRACTION         Family History   Problem Relation Age of Onset   • COPD Mother    • Depression Mother    • Hypertension Mother         Social History     Socioeconomic History   • Marital status:      Spouse name: Not on file   • Number of children: Not on file   • Years of education: Not on file   • Highest education level: Not on file   Occupational History   • Occupation: DJ   Tobacco Use   • Smoking status: Former Smoker     Packs/day: 1.50     Years: 50.00     Pack years: 75.00     Types: Cigarettes     Last attempt to quit: 6/15/2010     Years since quittin.3   • Smokeless tobacco: Never Used   Substance and Sexual Activity  "  • Alcohol use: Yes     Comment: beer occasional   • Drug use: No   • Sexual activity: Defer   Social History Narrative    Patient consumes 4 diet pepsi sodas daily.     Patient lives at home with wife.        Allergies   Allergen Reactions   • Penicillins Rash and Other (See Comments)     \"flu-like\" symptoms   • Lipitor [Atorvastatin] Other (See Comments) and Myalgia     Arthralgias   • Metformin Nausea Only   • Sotalol Dizziness   • Zocor [Simvastatin] Other (See Comments) and Myalgia     Arthralgias       Code Status and Medical Interventions:   Ordered at: 10/19/19 1246     Code Status:    CPR     Medical Interventions (Level of Support Prior to Arrest):    Full     Home Medications:  Medications Prior to Admission   Medication Sig Dispense Refill Last Dose   • allopurinol (ZYLOPRIM) 300 MG tablet Take 1 tablet by mouth Daily. 90 tablet 3 10/19/2019 at Unknown time   • cholecalciferol (VITAMIN D3) 27227 units capsule Take 1 capsule by mouth 1 (One) Time Per Week. (Patient taking differently: Take 1,000 Units by mouth Daily.) 12 capsule 1 Past Week at Unknown time   • CINNAMON PO Take 1,000 mg by mouth Daily.   Past Week at Unknown time   • citalopram (CeleXA) 20 MG tablet Take 1 tablet by mouth Daily. 90 tablet 3 10/19/2019 at Unknown time   • ELIQUIS 5 MG tablet tablet TAKE ONE TABLET BY MOUTH EVERY 12 HOURS 60 tablet 2 10/19/2019 at Unknown time   • gabapentin (NEURONTIN) 600 MG tablet Take 600 mg by mouth 3 (Three) Times a Day.   10/19/2019 at Unknown time   • levothyroxine (SYNTHROID, LEVOTHROID) 75 MCG tablet Take 75 mcg by mouth Daily.   10/19/2019 at Unknown time   • LUBRICANT EYE DROPS 0.4-0.3 % solution ophthalmic solution Administer 1 drop to both eyes at night as needed.   Past Week at Unknown time   • Morphine Sulfate ER 15 MG tablet extended-release 12 hour Take 1 tablet by mouth Every 12 (Twelve) Hours.   10/18/2019 at Unknown time   • omeprazole (priLOSEC) 40 MG capsule Take 1 capsule by mouth " "Daily. 90 capsule 1 10/19/2019 at Unknown time   • oxyCODONE-acetaminophen (PERCOCET) 7.5-325 MG per tablet 1 tablet Every 6 (Six) Hours As Needed.   10/19/2019 at Unknown time   • rosuvastatin (CRESTOR) 40 MG tablet Take 1 tablet by mouth Daily. 30 tablet 11 10/18/2019 at Unknown time   • sacubitril-valsartan (ENTRESTO) 24-26 MG tablet Take 1 tablet by mouth 2 (Two) Times a Day. 60 tablet 11 10/19/2019 at Unknown time   • tamsulosin (FLOMAX) 0.4 MG capsule 24 hr capsule Take 1 capsule by mouth Every Night. 90 capsule 3 10/18/2019 at Unknown time   • nitroglycerin (NITROSTAT) 0.4 MG SL tablet Place 1 tablet under the tongue As Needed for Chest Pain. 10 tablet 11 More than a month at Unknown time     Review of Systems  Negative systems except for what is noted in HPI     Vital Signs:  Blood pressure 96/54, pulse 68, temperature 97.4 °F (36.3 °C), temperature source Oral, resp. rate 16, height 175.3 cm (69\"), weight 93.9 kg (207 lb 0.2 oz), SpO2 96 %.    Physical Exam:  Constitutional:  Appears well-developed and well-nourished. No distress.   HEENT:  Normocephalic and atraumatic. PERRL he has Mallampati class II anatomy.  Neck:  Neck supple. No JVD present.   CV: Normal rate, regular rhythm, intact distal pulses.  No gallop, murmur or rub  Pulmonary/Chest: Effort normal and breath sounds normal. No respiratory distress. No wheezes, rhonchi or rales.   Abdominal: Soft. +BS.  No distension and no mass. There is no tenderness.   Musculoskeletal: Normal muscle tone and strength  Neurological: Alert and oriented to person, place, and time.  No focal deficits  Skin: Skin is warm and dry. No rash noted.   Extremities:  No clubbing, edema or cyanosis he has no significant edema.  Psychiatric: Normal mood and affect. Behavior is normal.     Labs:  Results from last 7 days   Lab Units 10/19/19  0951   WBC 10*3/mm3 6.21   HEMOGLOBIN g/dL 8.3*   HEMATOCRIT % 27.9*   PLATELETS 10*3/mm3 170     Results from last 7 days   Lab Units " 10/19/19  0951   SODIUM mmol/L 133*   POTASSIUM mmol/L 3.2*   CHLORIDE mmol/L 95*   CO2 mmol/L 27.0   BUN mg/dL 27*   CREATININE mg/dL 3.08*   CALCIUM mg/dL 8.5*   BILIRUBIN mg/dL 1.1   ALK PHOS U/L 328*   ALT (SGPT) U/L 26   AST (SGOT) U/L 69*   GLUCOSE mg/dL 106*     Magnesium   Date Value Ref Range Status   10/19/2019 2.5 (H) 1.6 - 2.4 mg/dL Final     Imaging: CXR chest x-ray shows some cardiac enlargement but no significant vascular congestion is noted.    ECHO: 2/6/19 Left ventricular wall thickness is consistent with mild concentric hypertrophy.  Left atrial cavity size is mild-to-moderately dilated.  Moderate dilation of the aortic root and of the sinuses of Valsalva present.  The following left ventricular wall segments are hypokinetic: apical lateral, basal inferolateral, mid inferolateral, apical inferior, basal inferoseptal, apex hypokinetic and basal inferoseptal. The following left ventricular wall segments are akinetic: basal inferior and mid inferior.  The left ventricular cavity is mild-to-moderately dilated.  Estimated EF = 40%.  Left ventricular systolic function is moderately decreased.  Left ventricular diastolic dysfunction (grade II) consistent with pseudonormalization.  Right ventricular cavity is mildly dilated.  There is mild calcification of the aortic valve mainly affecting the non coronary cusp(s).  Mild mitral valve regurgitation is present. Normal right ventricular wall thickness, systolic function and septal motion noted with right ventricular cavity mildly dilated.  The aortic valve exhibits sclerosis.  No evidence of pulmonary hypertension is present.  There is no evidence of pericardial effusion.    Assessment:  Active Hospital Problems    Diagnosis   • **Suspected GI bleed   • NSTEMI (non-ST elevated myocardial infarction) (CMS/HCC)     · Presentation [facility] 10/19/2019 troponin of 7 in the setting of GI bleed, anemia and elevated creatinine     • Acute kidney  failure (CMS/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  Grade 2 diastolic dysfunction.  Moderate dilation of the aortic root at the sinuses of Valsalva present. Left ventricular wall segments are hypokinetic: apical lateral, basal inferolateral, mid inferolateral, apical inferior, basal inferoseptal, apex hypokinetic and basal inferoseptal. The following left ventricular wall segments are akinetic: basal inferior and mid inferior.  · Presentation to [facility] 10/19/2019 with elevated proBNP in setting of GI bleed, acute kidney injury with elevated creatinine and non-STEMI     • Paroxysmal atrial fibrillation (CMS/HCC)     · Diagnosed 09/2016.  · Chadsvasc=5  · 2-week monitor (03/2019): SVT versus atrial tachycardia.  10% burden PACs  · Intolerant to sotalol due to dizziness, placed on metoprolol  · Chronic anticoagulation with Eliquis  · Presented to [facility] with GI bleed, anemia and acute kidney failure on 10/19/2019 and EKG showed rate controlled atrial fibrillation     • Hyperlipidemia LDL goal <70   • Coronary artery disease involving native coronary artery of native heart     · Cardiac cath for acute MI (10/1984): Severe single-vessel CAD with preserved LVEF.  Data deficit  · Cardiac cath for recurrent MI (1989): Multivessel disease.  Referred for CABG  · CABG (01/1990) reportedly CABG x6.  Data deficit  · Cardiac cath for recurrent angina at [facility] (05/2007): PTCA/stent placement to SVG of the diagonal.  Incomplete visualization of LIMA to LAD.  Occluded SVG to circumflex.  Patent SVG to distal PDA.  High grade stenosis of the vein graft of the first diagonal branch of the left anterior descending artery with occluded vein  graft to the left circumflex coronary artery with patent graft to the distal PDA  · Echo (2009): Normal LVEF 55%.  · Cardiac cath (2007): Patent LIMA to LAD.  Patent stents and vein graft of D1 to LAD.  Patent SVG to RCA.  Chronically occluded SVG to circumflex with adequate right to left collaterals.  Unchanged from prior study  · Cardiac cath (6/17/2016): Three-vessel CAD with occlusion of all coronary arteries and patent LIMA and patent SVG x2.  Medical therapy recommended       • Essential hypertension           • Gastroesophageal reflux disease   • Hypothyroidism (acquired)           He has a past history of throat cancer  Plan:   ICU admission  Hold Eliquis  Serial H/H  GI consult  Transfuse for Hgb <7   NPO except ice chips  PPI infusion  Cards following  Labs am  Patient and his wife give a history of several days of weakness and hallucinations.  He has had excessive somnolence and episodes of weakness.  He is excessive water drinking.  On arrival here he has elevation cardiac enzymes consistent with non-ST elevation MI and he has elevated BNP consistent with worsening congestive heart failure.  His stool for occult blood is positive consistent with a GI bleed.  He has also dropped his H&H compared to previously.    On examination his lungs are fairly clear.  Seems to have normal S1 and S2 on cardiac exam.  He has no peripheral edema.  Abdomen soft with bowel sounds present.    We will admit to the ICU and try to carefully give volume to improve his blood pressure.  Cardiology is following.  We will ask GI to see for further evaluation of possible active GI bleeding.  We will go to urine drug screen to see if there are any explanation for his hallucinations.  We will hold him n.p.o. except for ice chips until he is evaluated by GI.  We have discussed the situation with the patient and his wife.  SARAH Bland, AGACNP-BC, FNP-BC  Pulmonary & Critical Care Medicine  I reviewed the history  and added components as noted above.  I have examined the patient and record to my findings.  I agree with the assessment and plan as above.  Kojo Beck MD [facility]  Sleep Medicine  Pulmonary and Critical Care Medicine  Time: Critical care 35 min this is exclusive of procedures.  Patient is very critically ill and hypotensive and potentially unstable possibly life-threatening condition.  He will be closely monitored in the ICU.

## 2019-10-19 NOTE — ED PROVIDER NOTES
"Subjective   Mr. Fidencio Zarate Jr. is a 73 y.o. male who presents to the ED with c/o altered mental status. He reports for the past 2 days he has experienced confusion, hallucinations, cough, and \"gurgling\" when laying down. He notes when he watches TV he believes the images are in the room with him. He was watching a show with a zip line and he thought the zip line was above his head so he reached up to grab it. He also states he has been seeing man in his mirror watching TV with him and when he looks over and says \"how's it going\" the man disappears. He also sees his cat running across the floor when it is not. His wife states the confusion is the same throughout the day with no increase in confusion at night. He states he \"feels like (he) is in a different place than normal and the whole room has changed\". He vomited once since onset of confusion and it was white. He also complains of occasional abdominal discomfort, chronic headaches over one eye, chronic back pain, chronic knee pain, occasional voice weakness, dark urine, and malodorous urine. His wife notes he drinks water constantly and has been drinking even more in the past couple of days. She also notes the patient is only up for 2 hours per day and spends most of the day sleeping. She notes he experienced an episode where he was \"dead weight\" and she \"couldn't get though to him\". Dr. Henson, primary care provider, tested his urine and it was normal. He denies melanous stool, chest pain, shortness of breath, pain with urination, and fever. 1 day prior to onset he took a dose of Morphine for back pain. He usually takes Percocet and Gabapentin. In 1990 he had a CABG and experiences hallucinations on Morphine after the surgery where he thought the room was on fire and the TV was melting. He has a history of borderline diabetes. He denies a history of UTI, bleeding in his bladder, hepatitis, cirrhosis, and COPD. He has a history of cardiac stent " "placement. His last hospitalization was several years ago. He takes Eliquis and does not take Tylenol or Ibuprofen. His cardiologist is Dr. Sommers. He used to smoke and drink alcohol heavily but not anymore. There are no other acute complaints at this time.        History provided by:  Patient and spouse  Altered Mental Status   Presenting symptoms: confusion and lethargy    Severity:  Moderate  Most recent episode:  2 days ago  Episode history:  Continuous  Duration:  2 days  Timing:  Constant  Progression:  Unchanged  Context comment:  Took a dose of Morphine 1 day prior to onset  Associated symptoms: abdominal pain, hallucinations, headaches (chronic) and vomiting (white)    Associated symptoms: no fever        Review of Systems   Constitutional: Negative for fever.   Respiratory: Positive for cough. Negative for shortness of breath.    Cardiovascular: Negative for chest pain.   Gastrointestinal: Positive for abdominal pain and vomiting (white).        Negative for melanous stool.   Genitourinary: Negative for dysuria.        Positive for dark urine.  Positive for malodorous urine.   Musculoskeletal: Positive for back pain (chronic).        Positive for chronic knee pain.   Neurological: Positive for headaches (chronic).        Positive for occasional voice weakness.   Psychiatric/Behavioral: Positive for confusion and hallucinations.   All other systems reviewed and are negative.      Past Medical History:   Diagnosis Date   • Arthritis    • Cancer (CMS/HCC)     thoat cancer   • Coronary artery disease    • Gout    • History of chemotherapy    • Hyperlipidemia    • Hypertension    • Injury of back    • Low back pain    • Myocardial infarction (CMS/HCC)     x2   • PAF (paroxysmal atrial fibrillation) (CMS/HCC)    • Rotator cuff rupture    • Uses hearing aid     bilat        Allergies   Allergen Reactions   • Penicillins Rash and Other (See Comments)     \"flu-like\" symptoms   • Lipitor [Atorvastatin] Other (See " Comments) and Myalgia     Arthralgias   • Metformin Nausea Only   • Sotalol Dizziness   • Zocor [Simvastatin] Other (See Comments) and Myalgia     Arthralgias       Past Surgical History:   Procedure Laterality Date   • APPENDECTOMY     • CARDIAC CATHETERIZATION      x1 stent   • CARDIAC CATHETERIZATION N/A 2016    Procedure: Left Heart Cath;  Surgeon: Son Lam MD;  Location:  [facility]ASIVE [facility];  Service:    • CARDIAC SURGERY     • CATARACT EXTRACTION     • CHOLECYSTECTOMY LAPAROSCOPIC INTRAOPERATIVE CHOLANGIOGRAM WITH LIVER BIOPSY N/A 2016    Procedure: CHOLECYSTECTOMY LAPAROSCOPIC WITH LIVER BIOPSY;  Surgeon: Son Andrade MD;  Location:  [facility] OR;  Service:    • COLONOSCOPY      x2   • CORONARY ARTERY BYPASS GRAFT     • FOOT SURGERY      right    • FRACTURE SURGERY     • LASIK     • [place] ERCP DX COLLECTION SPECIMEN BRUSHING/WASHING N/A 2016    Procedure: ENDOSCOPIC RETROGRADE CHOLANGIOPANCREATOGRAPHY;  Surgeon: Omari Cardenas MD;  Location:  [facility];  Service: Gastroenterology   • TONSILLECTOMY     • WISDOM TOOTH EXTRACTION         Family History   Problem Relation Age of Onset   • COPD Mother    • Depression Mother    • Hypertension Mother        Social History     Socioeconomic History   • Marital status:      Spouse name: Not on file   • Number of children: Not on file   • Years of education: Not on file   • Highest education level: Not on file   Tobacco Use   • Smoking status: Former Smoker     Packs/day: 1.50     Years: 50.00     Pack years: 75.00     Types: Cigarettes     Last attempt to quit: 6/15/2010     Years since quittin.3   • Smokeless tobacco: Never Used   Substance and Sexual Activity   • Alcohol use: Yes     Comment: beer occasional   • Drug use: No   • Sexual activity: Defer   Social History Narrative    Patient consumes 4 diet pepsi sodas daily.     Patient lives at home with wife.          Objective   Physical Exam    Constitutional: He is oriented to person, place, and time. He appears well-developed and well-nourished.  Non-toxic appearance.   Somewhat sallow appearance.   HENT:   Head: Normocephalic and atraumatic.   Nose: Nose normal.   Mouth/Throat: Mucous membranes are dry.   Eyes: Conjunctivae are normal. No scleral icterus.   Neck: Normal range of motion. Neck supple.   Cardiovascular: Normal rate, regular rhythm and normal heart sounds.   No murmur heard.  Pulmonary/Chest: Effort normal and breath sounds normal. No respiratory distress.   Abdominal: Soft. There is no tenderness.   Musculoskeletal: Normal range of motion. He exhibits edema.   Trace pretibial edema.   Neurological: He is alert and oriented to person, place, and time.   Clear but slow speech.   Skin: Skin is warm and dry. There is pallor.   Psychiatric: He has a normal mood and affect. His behavior is normal.   Nursing note and vitals reviewed.      Critical Care  Performed by: Rodrigue Vilchis MD  Authorized by: Rodrigue Vilchis MD     Critical care provider statement:     Critical care time (minutes):  35    Critical care time was exclusive of:  Separately billable procedures and treating other patients    Critical care was necessary to treat or prevent imminent or life-threatening deterioration of the following conditions:  Cardiac failure and circulatory failure    Critical care was time spent personally by me on the following activities:  Discussions with consultants, development of treatment plan with patient or surrogate, examination of patient, review of old charts, ordering and review of laboratory studies, ordering and review of radiographic studies, ordering and performing treatments and interventions, re-evaluation of patient's condition, obtaining history from patient or surrogate, pulse oximetry and evaluation of patient's response to treatment    I assumed direction of critical care for this patient from another provider in my specialty: no     Comments:      Dr. Vilchis attended to the patient immediately.               ED Course  ED Course as of Oct 19 1123   Sat Oct 19, 2019   1046 Current blood pressure 92/57.  [MS]   1047 Paged Dr. Brito who is on for Dr. Sommers, cardiology. -MAS  [HF]   1113 Discussed the case with Dr. Brito, cardiologist, who agrees with need for admission will consult the patient. -MAS  [HF]   1116 Paged Dr. Beck, intensivist, for admission. -MAS  [HF]   1120 Discussed the case with Dr. Beck, intensivist, who will admit the patient. -MAS  [HF]      ED Course User Index  [HF] Leanne Abrams  [MS] Rodrigue Vilchis MD       Recent Results (from the past 24 hour(s))   Comprehensive Metabolic Panel    Collection Time: 10/19/19  9:51 AM   Result Value Ref Range    Glucose 106 (H) 65 - 99 mg/dL    BUN 27 (H) 8 - 23 mg/dL    Creatinine 3.08 (H) 0.76 - 1.27 mg/dL    Sodium 133 (L) 136 - 145 mmol/L    Potassium 3.2 (L) 3.5 - 5.2 mmol/L    Chloride 95 (L) 98 - 107 mmol/L    CO2 27.0 22.0 - 29.0 mmol/L    Calcium 8.5 (L) 8.6 - 10.5 mg/dL    Total Protein 6.4 6.0 - 8.5 g/dL    Albumin 3.60 3.50 - 5.20 g/dL    ALT (SGPT) 26 1 - 41 U/L    AST (SGOT) 69 (H) 1 - 40 U/L    Alkaline Phosphatase 328 (H) 39 - 117 U/L    Total Bilirubin 1.1 0.2 - 1.2 mg/dL    eGFR Non African Amer 20 (L) >60 mL/min/1.73    Globulin 2.8 gm/dL    A/G Ratio 1.3 g/dL    BUN/Creatinine Ratio 8.8 7.0 - 25.0    Anion Gap 11.0 5.0 - 15.0 mmol/L   Troponin    Collection Time: 10/19/19  9:51 AM   Result Value Ref Range    Troponin T 6.900 (C) 0.000 - 0.030 ng/mL   Magnesium    Collection Time: 10/19/19  9:51 AM   Result Value Ref Range    Magnesium 2.5 (H) 1.6 - 2.4 mg/dL   Light Blue Top    Collection Time: 10/19/19  9:51 AM   Result Value Ref Range    Extra Tube hold for add-on    Green Top (Gel)    Collection Time: 10/19/19  9:51 AM   Result Value Ref Range    Extra Tube Hold for add-ons.    Lavender Top    Collection Time: 10/19/19  9:51 AM   Result Value Ref  Range    Extra Tube hold for add-on    Gold Top - SST    Collection Time: 10/19/19  9:51 AM   Result Value Ref Range    Extra Tube Hold for add-ons.    CBC Auto Differential    Collection Time: 10/19/19  9:51 AM   Result Value Ref Range    WBC 6.21 3.40 - 10.80 10*3/mm3    RBC 3.33 (L) 4.14 - 5.80 10*6/mm3    Hemoglobin 8.3 (L) 13.0 - 17.7 g/dL    Hematocrit 27.9 (L) 37.5 - 51.0 %    MCV 83.8 79.0 - 97.0 fL    MCH 24.9 (L) 26.6 - 33.0 pg    MCHC 29.7 (L) 31.5 - 35.7 g/dL    RDW 17.4 (H) 12.3 - 15.4 %    RDW-SD 53.2 37.0 - 54.0 fl    MPV 10.7 6.0 - 12.0 fL    Platelets 170 140 - 450 10*3/mm3    Neutrophil % 65.0 42.7 - 76.0 %    Lymphocyte % 15.5 (L) 19.6 - 45.3 %    Monocyte % 16.4 (H) 5.0 - 12.0 %    Eosinophil % 1.8 0.3 - 6.2 %    Basophil % 1.0 0.0 - 1.5 %    Immature Grans % 0.3 0.0 - 0.5 %    Neutrophils, Absolute 4.04 1.70 - 7.00 10*3/mm3    Lymphocytes, Absolute 0.96 0.70 - 3.10 10*3/mm3    Monocytes, Absolute 1.02 (H) 0.10 - 0.90 10*3/mm3    Eosinophils, Absolute 0.11 0.00 - 0.40 10*3/mm3    Basophils, Absolute 0.06 0.00 - 0.20 10*3/mm3    Immature Grans, Absolute 0.02 0.00 - 0.05 10*3/mm3    nRBC 0.0 0.0 - 0.2 /100 WBC   BNP    Collection Time: 10/19/19  9:51 AM   Result Value Ref Range    proBNP 12,663.0 (H) 5.0 - 900.0 pg/mL   Ethanol    Collection Time: 10/19/19  9:51 AM   Result Value Ref Range    Ethanol <10 0 - 10 mg/dL   Ammonia    Collection Time: 10/19/19  9:51 AM   Result Value Ref Range    Ammonia 35 16 - 60 umol/L   CK    Collection Time: 10/19/19  9:51 AM   Result Value Ref Range    Creatine Kinase 138 20 - 200 U/L   POCT Occult Blood, stool    Collection Time: 10/19/19 11:03 AM   Result Value Ref Range    Fecal Occult Blood Positive (A) Negative    Lot Number [id number]     Expiration Date 1/22     DEVELOPER LOT NUMBER [id number]     DEVELOPER EXPIRATION DATE 10/22     Positive Control Positive Positive    Negative Control Negative Negative     Note: In addition to lab results from this visit, the  "labs listed above may include labs taken at another facility or during a different encounter within the last 24 hours. Please correlate lab times with ED admission and discharge times for further clarification of the services performed during this visit.    XR Chest 1 View   Preliminary Result   Heart is enlarged with no evidence of acute Panko disease.                Vitals:    10/19/19 0941 10/19/19 1028 10/19/19 1029 10/19/19 1030   BP: 102/53 94/57 (!) 83/48 (!) 69/46   BP Location: Left arm      Patient Position: Sitting Lying Sitting Standing   Pulse: 65 72 63 74   Resp: 18      Temp: 98 °F (36.7 °C)      TempSrc: Oral      SpO2: 92%      Weight: 88.5 kg (195 lb)      Height: 175.3 cm (69\")        Medications   sodium chloride 0.9 % flush 10 mL (not administered)   sodium chloride 0.9 % bolus 1,000 mL (1,000 mL Intravenous New Bag 10/19/19 1048)   pantoprazole (PROTONIX) injection 80 mg (80 mg Intravenous Given 10/19/19 1048)     ECG/EMG Results (last 24 hours)     Procedure Component Value Units Date/Time    ECG 12 Lead [[id number]] Collected:  10/19/19 0955     Updated:  10/19/19 0955        ECG 12 Lead   Final Result   Test Reason : Weak/Dizzy/AMS protocol   Blood Pressure : **/** mmHG   Vent. Rate : 067 BPM     Atrial Rate : 057 BPM      P-R Int : 000 ms          QRS Dur : 114 ms       QT Int : 396 ms       P-R-T Axes : 000 -04 143 degrees      QTc Int : 418 ms      Atrial fibrillation   Incomplete left bundle branch block   Nonspecific ST and T wave abnormality , probably digitalis effect   Abnormal ECG   Confirmed by MELANIE FINNEGAN MD ([id number]) on 10/19/2019 10:37:07 AM      Referred By:  edmd           Confirmed By:MELANIE FINNEGAN MD                        [facility]    Final diagnoses:   Non-ST elevation MI (NSTEMI) (CMS/HCC)   Gastrointestinal hemorrhage, unspecified gastrointestinal hemorrhage type   Blood loss anemia   Acute renal failure, unspecified acute renal failure type (CMS/HCC)   Hypotension, unspecified " hypotension type       Documentation assistance provided by deandra Abrams.  Information recorded by the deandra was done at my direction and has been verified and validated by me.     Leanne Abrams  10/19/19 1053       Leanne Abrams  10/19/19 1119       Leanne Abrams  10/19/19 1123       Rodrigue Vilchis MD  10/20/19 7732

## 2019-10-19 NOTE — CONSULTS
"NAL Consult Note    Fidencio Zarate Jr.  1945  [id number]    Date of Admit:  10/19/2019    Date of Consult: 10/19/2019        Requesting Provider: No ref. provider found  Evaluating Physician: Tessie Benoit DO        Reason for Consultation:  ghanshyam  History of present illness:    Patient is a 73 y.o.  Yr old male with c/o dry nonproductive cough, \"gurgling\" when laying down, confusion, hallucinations and excessive thirst.  He was watching a TV show on [employer] and thought he was doing it so he reached up to grab the cable.  He has been having intermittent abdominal cramps as well for about 2-3 weeks now.  He does not associate them with eating but he has had diarrhea.  He states that he has had excessive thirst.  His wife states that he is \"constantly\" drinking water.  baseline cr is 1 in April 2019.  BP low on admission. Hb dropped about 4 grams since last time it had been checked. UA with protein. History of BPH. Admits to taking advil pm 2-3 times in the last few weeks    Past Medical History:   Diagnosis Date   • Arthritis    • Cancer (CMS/HCC)     thoat cancer, s/p chemo and XRT   • Coronary artery disease    • Gout    • History of chemotherapy    • Hyperlipidemia    • Hypertension    • Injury of back    • Low back pain    • Myocardial infarction (CMS/HCC)     x2   • PAF (paroxysmal atrial fibrillation) (CMS/HCC)    • Rotator cuff rupture    • Uses hearing aid     bilat        Past Surgical History:   Procedure Laterality Date   • APPENDECTOMY     • CARDIAC CATHETERIZATION      x1 stent   • CARDIAC CATHETERIZATION N/A 6/17/2016    Procedure: Left Heart Cath;  Surgeon: Son Lam MD;  Location: [facility] CATH INVASIVE LOCATION;  Service:    • CARDIAC SURGERY     • CATARACT EXTRACTION  2009   • CHOLECYSTECTOMY LAPAROSCOPIC INTRAOPERATIVE CHOLANGIOGRAM WITH LIVER BIOPSY N/A 7/8/2016    Procedure: CHOLECYSTECTOMY LAPAROSCOPIC WITH LIVER BIOPSY;  Surgeon: Son Andrade MD;  Location: [facility] OR; " " Service:    • COLONOSCOPY      x2   • CORONARY ARTERY BYPASS GRAFT     • FOOT SURGERY      right    • FRACTURE SURGERY     • LASIK     • [place] ERCP DX COLLECTION SPECIMEN BRUSHING/WASHING N/A 2016    Procedure: ENDOSCOPIC RETROGRADE CHOLANGIOPANCREATOGRAPHY;  Surgeon: Omari Cardenas MD;  Location: [facility] ENDOSCOPY;  Service: Gastroenterology   • TONSILLECTOMY     • WISDOM TOOTH EXTRACTION         Social History     Socioeconomic History   • Marital status:      Spouse name: Not on file   • Number of children: Not on file   • Years of education: Not on file   • Highest education level: Not on file   Occupational History   • Occupation: DJ   Tobacco Use   • Smoking status: Former Smoker     Packs/day: 1.50     Years: 50.00     Pack years: 75.00     Types: Cigarettes     Last attempt to quit: 6/15/2010     Years since quittin.3   • Smokeless tobacco: Never Used   Substance and Sexual Activity   • Alcohol use: Yes     Comment: beer occasional   • Drug use: No   • Sexual activity: Defer   Social History Narrative    Patient consumes 4 diet pepsi sodas daily.     Patient lives at home with wife.        family history includes COPD in his mother; Depression in his mother; Hypertension in his mother.    Allergies   Allergen Reactions   • Penicillins Rash and Other (See Comments)     \"flu-like\" symptoms   • Lipitor [Atorvastatin] Other (See Comments) and Myalgia     Arthralgias   • Metformin Nausea Only   • Sotalol Dizziness   • Zocor [Simvastatin] Other (See Comments) and Myalgia     Arthralgias       Medication:    Current Facility-Administered Medications:   •  aspirin chewable tablet 81 mg, 81 mg, Oral, Daily, Rosanna Logan APRN  •  nitroglycerin (NITROSTAT) SL tablet 0.4 mg, 0.4 mg, Sublingual, PRN, Rosanna Logan APRKOREY  •  ondansetron (ZOFRAN) injection 4 mg, 4 mg, Intravenous, Q6H PRN, Meghan Gunderson APRN  •  pantoprazole (PROTONIX) 40 mg/100 mL (0.4 mg/mL) in " 0.9% NS IVPB, 8 mg/hr, Intravenous, Continuous, Meghan Gunderson, APRKOREY  •  rosuvastatin (CRESTOR) tablet 40 mg, 40 mg, Oral, Nightly, Rosanna Logan APRN  •  sodium chloride 0.9 % flush 10 mL, 10 mL, Intravenous, Q12H, Rosanna Logan APRN  •  sodium chloride 0.9 % flush 10 mL, 10 mL, Intravenous, PRN, Rosanna Logan APRN    Medications Prior to Admission   Medication Sig Dispense Refill Last Dose   • allopurinol (ZYLOPRIM) 300 MG tablet Take 1 tablet by mouth Daily. 90 tablet 3 10/19/2019 at Unknown time   • cholecalciferol (VITAMIN D3) 05617 units capsule Take 1 capsule by mouth 1 (One) Time Per Week. (Patient taking differently: Take 1,000 Units by mouth Daily.) 12 capsule 1 Past Week at Unknown time   • CINNAMON PO Take 1,000 mg by mouth Daily.   Past Week at Unknown time   • citalopram (CeleXA) 20 MG tablet Take 1 tablet by mouth Daily. 90 tablet 3 10/19/2019 at Unknown time   • ELIQUIS 5 MG tablet tablet TAKE ONE TABLET BY MOUTH EVERY 12 HOURS 60 tablet 2 10/19/2019 at Unknown time   • gabapentin (NEURONTIN) 600 MG tablet Take 600 mg by mouth 3 (Three) Times a Day.   10/19/2019 at Unknown time   • levothyroxine (SYNTHROID, LEVOTHROID) 75 MCG tablet Take 75 mcg by mouth Daily.   10/19/2019 at Unknown time   • LUBRICANT EYE DROPS 0.4-0.3 % solution ophthalmic solution Administer 1 drop to both eyes at night as needed.   Past Week at Unknown time   • Morphine Sulfate ER 15 MG tablet extended-release 12 hour Take 1 tablet by mouth Every 12 (Twelve) Hours.   10/18/2019 at Unknown time   • omeprazole (priLOSEC) 40 MG capsule Take 1 capsule by mouth Daily. 90 capsule 1 10/19/2019 at Unknown time   • oxyCODONE-acetaminophen (PERCOCET) 7.5-325 MG per tablet 1 tablet Every 6 (Six) Hours As Needed.   10/19/2019 at Unknown time   • rosuvastatin (CRESTOR) 40 MG tablet Take 1 tablet by mouth Daily. 30 tablet 11 10/18/2019 at Unknown time   • sacubitril-valsartan (ENTRESTO) 24-26 MG tablet Take 1  "tablet by mouth 2 (Two) Times a Day. 60 tablet 11 10/19/2019 at Unknown time   • tamsulosin (FLOMAX) 0.4 MG capsule 24 hr capsule Take 1 capsule by mouth Every Night. 90 capsule 3 10/18/2019 at Unknown time   • nitroglycerin (NITROSTAT) 0.4 MG SL tablet Place 1 tablet under the tongue As Needed for Chest Pain. 10 tablet 11 More than a month at Unknown time       Review of Systems:    Constitutional-- No Fever, chills or sweats. CV-- No chest pain, palpitations, soa, or edema  Resp-- No SOB/cough/Hemoptysis  GI- No nausea, vomiting, or diarrhea.  No hematochezia, melena, or hematemesis.  -- No dysuria, hematuria, or flank pain. No lower tract obstructive symptoms  Skin-- No rash, warm and dry  Lymph- no painful or swollen lymph nodes in neck/axilla or groin.   Heme- No active bruising or bleeding; no Hx of DVT or PE.  MS-- no swelling or pain in the joints  Neuro-- No acute focal weakness or numbness in the arms or legs.  No seizures.  Psych--No anxiety or depression  Endo--No cold or heat intolerance.  No polyuria, polydipsia, or polyphagia    Full review of systems reviewed and negative otherwise for acute complaints    Physical Exam:   Vital Signs   Blood pressure (!) 89/78, pulse 58, temperature 97.4 °F (36.3 °C), temperature source Oral, resp. rate 16, height 175.3 cm (69\"), weight 93.9 kg (207 lb 0.2 oz), SpO2 98 %.     GENERAL: Awake but still confused   HEENT: Normocephalic, atraumatic.  PER.  No conjunctivitis. No icterus. Oropharynx clear without evidence of thrush or exudate. No evidence of peridontal disease.    HEART: RRR; No murmur, rubs, gallops. No bruits in neck, abdomen, or groins, ++ edema  LUNGS: Normal respiratory effort. Nonlabored. No dullness.  No crepitus.  Clear to auscultation bilaterally without wheezing, rales, rhonchi.  ABDOMEN: Soft, nontender, nondistended. Positive bowel sounds. No rebound or guarding. NO mass or HSM.  JOINTS:  Full range of motion, no redness or tenderness.  EXT:  " No cyanosis, clubbing + edema.  SKIN: Warm and dry without rash      Laboratory Data  Results from last 7 days   Lab Units 10/19/19  0951   HEMOGLOBIN g/dL 8.3*   HEMATOCRIT % 27.9*     Results from last 7 days   Lab Units 10/19/19  0951   SODIUM mmol/L 133*   POTASSIUM mmol/L 3.2*   CHLORIDE mmol/L 95*   CO2 mmol/L 27.0   BUN mg/dL 27*   CREATININE mg/dL 3.08*   CALCIUM mg/dL 8.5*   MAGNESIUM mg/dL 2.5*   ALBUMIN g/dL 3.60     Results from last 7 days   Lab Units 10/19/19  0951   GLUCOSE mg/dL 106*     Results from last 7 days   Lab Units 10/19/19  1119   COLOR UA  Yellow   CLARITY UA  Clear   PH, URINE  6.0   SPECIFIC GRAVITY, URINE  1.008   GLUCOSE UA  Negative   KETONES UA  Negative   BILIRUBIN UA  Negative   PROTEIN UA  100 mg/dL (2+)*   BLOOD UA  Large (3+)*   LEUKOCYTES UA  Negative   NITRITE UA  Negative     Results from last 7 days   Lab Units 10/19/19  0951   ALK PHOS U/L 328*   BILIRUBIN mg/dL 1.1   ALT (SGPT) U/L 26   AST (SGOT) U/L 69*     Estimated Creatinine Clearance: 24.5 mL/min (A) (by C-G formula based on SCr of 3.08 mg/dL (H)).    Radiology:      Renal Imaging:    I personally read  the radiographic studies    Impression:   BONNIE secondary to decreased IVV, anemia, hypotension  CKD stage 3 at baseline  Hypotension secondary to ? GI bleed  Hyponatremia secondary to decreased solute intake, bonnie  PAF on  Anticoagulation    PLAN: Thank you for asking us to see Fidencio Zarate Jr., I recommend the following:   Check renal us, bladder scans with history of flomax, may need amos if pvr greater than 500  Start NS at 75 cc/hr  Check pc ratio  ? Transfuse, defer to icu  No acute need to RRT  Add midodrine 5 tid to help with bp  Check 1 blood culture    Tessie Benoit DO  10/19/2019  2:09 PM

## 2019-10-19 NOTE — PLAN OF CARE
Problem: Skin Injury Risk (Adult)  Goal: Identify Related Risk Factors and Signs and Symptoms  Outcome: Outcome(s) achieved Date Met: 10/19/19    Goal: Skin Health and Integrity  Outcome: Ongoing (interventions implemented as appropriate)      Problem: Pain, Chronic (Adult)  Goal: Identify Related Risk Factors and Signs and Symptoms  Outcome: Outcome(s) achieved Date Met: 10/19/19    Goal: Acceptable Pain/Comfort Level and Functional Ability  Outcome: Ongoing (interventions implemented as appropriate)      Problem: Gastrointestinal Bleeding (Adult)  Goal: Signs and Symptoms of Listed Potential Problems Will be Absent, Minimized or Managed (Gastrointestinal Bleeding)  Outcome: Ongoing (interventions implemented as appropriate)      Problem: Renal Failure/Kidney Injury, Acute (Adult)  Goal: Signs and Symptoms of Listed Potential Problems Will be Absent, Minimized or Managed (Renal Failure/Kidney Injury, Acute)  Outcome: Ongoing (interventions implemented as appropriate)

## 2019-10-20 NOTE — PROGRESS NOTES
[facility]  Cardiology Progress Note      Chief Complaint/Reason for service:    · Non-STEMI         Echocardiogram to be preformed today.  Creatinine remains elevated at 2.93.  Potassium decreased to 3.1.  Patient remains anemic with a decreased H&H but only mildly decreased when compared to yesterday.  Troponin remains critically elevated but trending down.  GI was consulted for suspicion of GI bleed.  EGD planned for Monday.  Started on daily PPI.  Nephrology is also consulting and renal artery duplex study has been ordered.  No chest pain overnight.  Blood pressure remains low with a systolic in the 90s.  Patient on midodrine and all antihypertensives being held.    Past medical, surgical, social and family history reviewed in the patient's electronic medical record.           Vital Sign Min/Max for last 24 hours  Temp  Min: 97.4 °F (36.3 °C)  Max: 98.7 °F (37.1 °C)   BP  Min: 69/46  Max: 133/71   Pulse  Min: 50  Max: 74   Resp  Min: 16  Max: 16   SpO2  Min: 92 %  Max: 100 %   Flow (L/min)  Min: 1  Max: 2      Intake/Output Summary (Last 24 hours) at 10/20/2019 0944  Last data filed at 10/20/2019 0800  Gross per 24 hour   Intake 1128.8 ml   Output 2750 ml   Net -1621.2 ml           Physical Exam   Constitutional: He is oriented to person, place, and time. He appears well-developed and well-nourished.   HENT:   Head: Normocephalic.   Neck: No JVD present. Carotid bruit is not present.   Cardiovascular: Normal rate, regular rhythm and normal heart sounds. Exam reveals no gallop and no friction rub.   No murmur heard.  Pulmonary/Chest: Effort normal and breath sounds normal.   Abdominal: Soft. Bowel sounds are normal. He exhibits no distension.   Neurological: He is alert and oriented to person, place, and time.   Skin: Skin is warm and dry.   Psychiatric: He has a normal mood and affect.       Tele: Normal sinus rhythm    Results Review (reviewed the patient's recent labs in  the electronic medical record):     EKG: Normal sinus rhythm with occasional PVCs 10/20/2019        Results from last 7 days   Lab Units 10/20/19  0528 10/19/19  2345 10/19/19  1412 10/19/19  0951   SODIUM mmol/L 138  --   --  133*   POTASSIUM mmol/L 3.1* 3.5 3.1* 3.2*   CHLORIDE mmol/L 102  --   --  95*   BUN mg/dL 24*  --   --  27*   CREATININE mg/dL 2.93*  --   --  3.08*   MAGNESIUM mg/dL 2.6*  --   --  2.5*     Results from last 7 days   Lab Units 10/19/19  2345 10/19/19  1806 10/19/19  1412   TROPONIN T ng/mL 5.570* 5.790* 6.100*     Results from last 7 days   Lab Units 10/20/19  0528 10/19/19  2345 10/19/19  1806 10/19/19  0951   WBC 10*3/mm3 3.78  --   --  6.21   HEMOGLOBIN g/dL 8.3* 8.9* 7.8* 8.3*   HEMATOCRIT % 28.0* 29.8* 26.5* 27.9*   PLATELETS 10*3/mm3 124*  --   --  170       Lab Results   Component Value Date    HGBA1C 5.70 (H) 10/20/2019       Lab Results   Component Value Date    CHOL 185 03/13/2019    CHLPL 220 (H) 09/28/2018    TRIG 222 (H) 03/13/2019    HDL 53 03/13/2019     03/13/2019            Active Hospital Problems    Diagnosis POA   • **Suspected GI bleed [K92.2] Yes     Priority: High   • NSTEMI (non-ST elevated myocardial infarction) (CMS/HCC) [I21.4] Yes     Priority: High     · Presentation [facility] 10/19/2019 troponin of 7 in the setting of GI bleed, anemia and elevated creatinine     • Acute on chronic combined systolic and diastolic congestive heart failure (CMS/HCC) [I50.43] Yes     Priority: High     · Echo (10/6/2016): LVEF 50%  · Echo (3/6/2019): Reduced LVEF 40%.  Grade 2 diastolic dysfunction.  Moderate dilation of the aortic root at the sinuses of Valsalva present. Left ventricular wall segments are hypokinetic: apical lateral, basal inferolateral, mid inferolateral, apical inferior, basal inferoseptal, apex hypokinetic and basal inferoseptal. The following left ventricular wall segments are akinetic: basal inferior and mid inferior.  · Presentation to  [facility] 10/19/2019 with elevated proBNP in setting of GI bleed, acute kidney injury with elevated creatinine and non-STEMI     • Paroxysmal atrial fibrillation (CMS/HCC) [I48.0] Yes     Priority: High     · Diagnosed 09/2016.  · Chadsvasc=5  · 2-week monitor (03/2019): SVT versus atrial tachycardia.  10% burden PACs  · Intolerant to sotalol due to dizziness, placed on metoprolol  · Chronic anticoagulation with Eliquis  · Presented to [facility] with GI bleed, anemia and acute kidney failure on 10/19/2019 and EKG showed rate controlled atrial fibrillation     • Coronary artery disease involving native coronary artery of native heart [I25.10] Yes     Priority: High     · Cardiac cath for acute MI (10/1984): Severe single-vessel CAD with preserved LVEF.  Data deficit  · Cardiac cath for recurrent MI (1989): Multivessel disease.  Referred for CABG  · CABG (01/1990) reportedly CABG x6.  Data deficit  · Cardiac cath for recurrent angina at [facility] (05/2007): PTCA/stent placement to SVG of the diagonal.  Incomplete visualization of LIMA to LAD.  Occluded SVG to circumflex.  Patent SVG to distal PDA.  High grade stenosis of the vein graft of the first diagonal branch of the left anterior descending artery with occluded vein graft to the left circumflex coronary artery with patent graft to the distal PDA  · Echo (2009): Normal LVEF 55%.  · Cardiac cath (2007): Patent LIMA to LAD.  Patent stents and vein graft of D1 to LAD.  Patent SVG to RCA.  Chronically occluded SVG to circumflex with adequate right to left collaterals.  Unchanged from prior study  · Cardiac cath (6/17/2016): Three-vessel CAD with occlusion of all coronary arteries and patent LIMA and patent SVG x2.  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 [F06.0] Yes     Priority: Medium   •  "Hyperlipidemia LDL goal <70 [E78.5] Yes     Priority: Medium   • Essential hypertension [I10] Yes     Priority: Medium           • Hypothyroidism (acquired) [E03.9] Yes     Priority: Medium           • Hyponatremia [E87.1] Yes     Priority: Low   • Gastroesophageal reflux disease [K21.9] Yes     Priority: Low   • Acute kidney failure (CMS/HCC) [N17.9] Yes   • Non-ST elevation MI (NSTEMI) (CMS/[facility]) [I21.4] Yes              · Continue to hold antihypertensives  · Awaiting results of echocardiogram  · Will need eventual ischemic evaluation once anemia and acute renal failure resolved  · Hold Entresto and other nephrotoxic medications  · Continue to hold anticoagulation but continue daily aspirin    Cathy Logan, APRN  10/20/2019   ___________________________________________________________  The patient was seen and examined by me.  Agree and verified/discussed key components of E/M as outlined by the Nurse practitioner/PA.    /72   Pulse 59   Temp 97.7 °F (36.5 °C) (Oral)   Resp 16   Ht 175.3 cm (69\")   Wt 93.9 kg (207 lb 0.2 oz)   SpO2 93%   BMI 30.57 kg/m²     General Appearance:   · well developed  · well nourished  Neck:  · thyroid not enlarged  · supple  Respiratory:  · no respiratory distress  · normal breath sounds  · no rales  Cardiovascular:  · no jugular venous distention  · regular rhythm  · apical impulse normal  · S1 normal, S2 normal  · no S3, no S4   · no murmur  · no rub, no thrill  · carotid pulses normal; no bruit  · pedal pulses normal  · lower extremity edema: none  .   Skin:   · warm, dry        Plan:    Creatinine remains elevated  Mental status somewhat improved  No active chest pain or shortness of breath, EKG shows no ischemic changes  For ischemic evaluation at this time given acute kidney injury and possible GI bleed.    Ayaan Brito MD, [facility]     "

## 2019-10-20 NOTE — CONSULTS
[facility]ology Consult    Referring Provider: Kojo Beck MD    PCP: Deborah Henson PA-C    Reason for Consultation: Anemia    Chief complaint: Abdominal pain    History of present illness:    Fidencio Zarate Jr. is a 73 y.o. male who is admitted with 3 week history of abdominal cramping, not associated with meals. There is associated diarrhea, thirst, confusion. Drinking extra water did not improve symptoms. He is treated for acute kidney injury. He is found to have anemia (Hgb ~8) and is hemoccult positive. He has history of colon polyps. Last exam in 11/2017 with Dr. Stapleton, when a single 8 mm polyp removed from transverse colon.     Allergies:  Penicillins; Lipitor [atorvastatin]; Metformin; Sotalol; and Zocor [simvastatin]    Scheduled Meds:    aspirin 81 mg Oral Daily   midodrine 5 mg Oral TID AC   rosuvastatin 40 mg Oral Nightly   sodium chloride 10 mL Intravenous Q12H        Infusions:    pantoprazole 8 mg/hr Last Rate: 8 mg/hr (10/19/19 2051)   sodium chloride 75 mL/hr Last Rate: 75 mL/hr (10/19/19 1420)       PRN Meds:  nitroglycerin  •  ondansetron  •  sodium chloride    Home Meds:  Medications Prior to Admission   Medication Sig Dispense Refill Last Dose   • allopurinol (ZYLOPRIM) 300 MG tablet Take 1 tablet by mouth Daily. 90 tablet 3 10/19/2019 at Unknown time   • cholecalciferol (VITAMIN D3) 64067 units capsule Take 1 capsule by mouth 1 (One) Time Per Week. (Patient taking differently: Take 1,000 Units by mouth Daily.) 12 capsule 1 Past Week at Unknown time   • CINNAMON PO Take 1,000 mg by mouth Daily.   Past Week at Unknown time   • citalopram (CeleXA) 20 MG tablet Take 1 tablet by mouth Daily. 90 tablet 3 10/19/2019 at Unknown time   • ELIQUIS 5 MG tablet tablet TAKE ONE TABLET BY MOUTH EVERY 12 HOURS 60 tablet 2 10/19/2019 at Unknown time   • gabapentin (NEURONTIN) 600 MG tablet Take 600 mg by mouth 3 (Three) Times a Day.   10/19/2019 at Unknown time   • levothyroxine (SYNTHROID,  LEVOTHROID) 75 MCG tablet Take 75 mcg by mouth Daily.   10/19/2019 at Unknown time   • LUBRICANT EYE DROPS 0.4-0.3 % solution ophthalmic solution Administer 1 drop to both eyes at night as needed.   Past Week at Unknown time   • Morphine Sulfate ER 15 MG tablet extended-release 12 hour Take 1 tablet by mouth Every 12 (Twelve) Hours.   10/18/2019 at Unknown time   • omeprazole (priLOSEC) 40 MG capsule Take 1 capsule by mouth Daily. 90 capsule 1 10/19/2019 at Unknown time   • oxyCODONE-acetaminophen (PERCOCET) 7.5-325 MG per tablet 1 tablet Every 6 (Six) Hours As Needed.   10/19/2019 at Unknown time   • rosuvastatin (CRESTOR) 40 MG tablet Take 1 tablet by mouth Daily. 30 tablet 11 10/18/2019 at Unknown time   • sacubitril-valsartan (ENTRESTO) 24-26 MG tablet Take 1 tablet by mouth 2 (Two) Times a Day. 60 tablet 11 10/19/2019 at Unknown time   • tamsulosin (FLOMAX) 0.4 MG capsule 24 hr capsule Take 1 capsule by mouth Every Night. 90 capsule 3 10/18/2019 at Unknown time   • nitroglycerin (NITROSTAT) 0.4 MG SL tablet Place 1 tablet under the tongue As Needed for Chest Pain. 10 tablet 11 More than a month at Unknown time       ROS: Review of Systems   Constitutional: Positive for activity change and fatigue. Negative for appetite change, chills, fever and unexpected weight change.   HENT: Negative.  Negative for mouth sores, nosebleeds and trouble swallowing.    Eyes: Negative.    Respiratory: Negative for cough, chest tightness, shortness of breath and wheezing.    Cardiovascular: Negative for chest pain, palpitations and leg swelling.   Gastrointestinal: Positive for abdominal pain. Negative for abdominal distention, blood in stool, constipation, diarrhea, nausea and vomiting.   Endocrine: Negative.    Genitourinary: Negative for difficulty urinating and dysuria.   Musculoskeletal: Positive for arthralgias and back pain.   Skin: Negative.  Negative for color change, pallor and rash.   Allergic/Immunologic: Negative.     Neurological: Positive for tremors and weakness. Negative for seizures, facial asymmetry, light-headedness and headaches.   Hematological: Negative.  Negative for adenopathy. Does not bruise/bleed easily.   Psychiatric/Behavioral: Positive for confusion, decreased concentration and hallucinations.       PAST MED HX:  Past Medical History:   Diagnosis Date   • Arthritis    • Cancer (CMS/HCC)     thoat cancer, s/p chemo and XRT   • Coronary artery disease    • Gout    • History of chemotherapy    • Hyperlipidemia    • Hypertension    • Injury of back    • Low back pain    • Myocardial infarction (CMS/HCC)     x2   • PAF (paroxysmal atrial fibrillation) (CMS/HCC)    • Rotator cuff rupture    • Uses hearing aid     bilat        PAST SURG HX:  Past Surgical History:   Procedure Laterality Date   • APPENDECTOMY     • CARDIAC CATHETERIZATION      x1 stent   • CARDIAC CATHETERIZATION N/A 6/17/2016    Procedure: Left Heart Cath;  Surgeon: Son Lam MD;  Location:  [facility];  Service:    • CARDIAC SURGERY     • CATARACT EXTRACTION  2009   • CHOLECYSTECTOMY LAPAROSCOPIC INTRAOPERATIVE CHOLANGIOGRAM WITH LIVER BIOPSY N/A 7/8/2016    Procedure: CHOLECYSTECTOMY LAPAROSCOPIC WITH LIVER BIOPSY;  Surgeon: Son Andrade MD;  Location:  [facility];  Service:    • COLONOSCOPY      x2   • CORONARY ARTERY BYPASS GRAFT     • FOOT SURGERY  1962    right    • FRACTURE SURGERY     • LASIK     • MO ERCP DX COLLECTION SPECIMEN BRUSHING/WASHING N/A 6/24/2016    Procedure: ENDOSCOPIC RETROGRADE CHOLANGIOPANCREATOGRAPHY;  Surgeon: Omari Cardenas MD;  Location:  [facility];  Service: Gastroenterology   • TONSILLECTOMY     • WISDOM TOOTH EXTRACTION         FAM HX:  Family History   Problem Relation Age of Onset   • COPD Mother    • Depression Mother    • Hypertension Mother        SOC HX:  Social History     Socioeconomic History   • Marital status:      Spouse name: Not on file   •  "Number of children: Not on file   • Years of education: Not on file   • Highest education level: Not on file   Occupational History   • Occupation: DJ   Tobacco Use   • Smoking status: Former Smoker     Packs/day: 1.50     Years: 50.00     Pack years: 75.00     Types: Cigarettes     Last attempt to quit: 6/15/2010     Years since quittin.3   • Smokeless tobacco: Never Used   Substance and Sexual Activity   • Alcohol use: Yes     Comment: beer occasional   • Drug use: No   • Sexual activity: Defer   Social History Narrative    Patient consumes 4 diet pepsi sodas daily.     Patient lives at home with wife.        PHYSICAL EXAM  /71   Pulse 59   Temp 98.5 °F (36.9 °C)   Resp 16   Ht 175.3 cm (69\")   Wt 93.9 kg (207 lb 0.2 oz)   SpO2 100%   BMI 30.57 kg/m²   Wt Readings from Last 3 Encounters:   10/19/19 93.9 kg (207 lb 0.2 oz)   19 92.5 kg (204 lb)   19 92.5 kg (204 lb)   ,body mass index is 30.57 kg/m².  Physical Exam   Constitutional: He is oriented to person, place, and time. He appears well-developed and well-nourished. No distress.   HENT:   Head: Normocephalic.   Eyes: Conjunctivae are normal. No scleral icterus.   Neck: Normal range of motion.   Cardiovascular: Normal rate and regular rhythm.   Pulmonary/Chest: Effort normal and breath sounds normal. No stridor. No respiratory distress. He has no wheezes. He has no rales.   Abdominal: Soft. Bowel sounds are normal. He exhibits no distension and no mass. There is no tenderness. There is no guarding.   Genitourinary:   Genitourinary Comments: deferred   Musculoskeletal: Normal range of motion. He exhibits no edema.   Neurological: He is alert and oriented to person, place, and time.   Skin: Skin is warm and dry. Capillary refill takes less than 2 seconds. No rash noted.   Psychiatric: He has a normal mood and affect. His behavior is normal.   Nursing note and vitals reviewed.      Results Review:   I reviewed the patient's new clinical " results.    Lab Results   Component Value Date    WBC 6.21 10/19/2019    HGB 7.8 (L) 10/19/2019    HGB 8.3 (L) 10/19/2019    HGB 12.8 (L) 09/28/2018    HCT 26.5 (L) 10/19/2019    MCV 83.8 10/19/2019     10/19/2019       Lab Results   Component Value Date    INR 1.22 06/16/2016       Lab Results   Component Value Date    GLUCOSE 106 (H) 10/19/2019    BUN 27 (H) 10/19/2019    CREATININE 3.08 (H) 10/19/2019    EGFRIFNONA 20 (L) 10/19/2019    EGFRIFAFRI 67 09/28/2018    BCR 8.8 10/19/2019    CO2 27.0 10/19/2019    CALCIUM 8.5 (L) 10/19/2019    PROTENTOTREF 7.2 04/30/2019    ALBUMIN 3.60 10/19/2019    ALKPHOS 328 (H) 10/19/2019    BILITOT 1.1 10/19/2019    BILIDIR 4.9 (H) 06/17/2016    ALT 26 10/19/2019    AST 69 (H) 10/19/2019       ASSESSMENTS/PLANS    Chronic blood loss anemia. Microcytic indices. Essentially negative colonoscopy in 2017.  Heme positive stool.  Chronic anticoagulation for atrial fibrillation.  Elevated liver enzymes. Liver biopsy in 2016 showed minimal periportal fibrosis.    >> EGD Monday  >> Daily oral PPI    I discussed the patients findings and my recommendations with patient.    Mark I. Brunner, MD  10/19/19  8:53 PM

## 2019-10-20 NOTE — PROGRESS NOTES
"   LOS: 1 day    Patient Care Team:  Deborah Henson PA-C as PCP - General (Physician Assistant)    Reason For Visit:    Subjective     Patient seen/examined. Has been having to be straight cathed, agreeable to amos today      Review of Systems:    Pulm: No soa   CV:  No CP      Objective       aspirin 81 mg Oral Daily   midodrine 5 mg Oral TID AC   pantoprazole 40 mg Oral Q AM   rosuvastatin 40 mg Oral Nightly   sodium chloride 10 mL Intravenous Q12H       sodium chloride 75 mL/hr Last Rate: 75 mL/hr (10/20/19 0240)         Vital Signs:  Blood pressure 98/49, pulse 50, temperature 97.4 °F (36.3 °C), temperature source Axillary, resp. rate 16, height 175.3 cm (69\"), weight 93.9 kg (207 lb 0.2 oz), SpO2 96 %.    Flowsheet Rows      First Filed Value   Admission Height  175.3 cm (69\") Documented at 10/19/2019 0941   Admission Weight  88.5 kg (195 lb) Documented at 10/19/2019 0941          10/19 0701 - 10/20 0700  In: 1068.8 [P.O.:60; I.V.:748.8]  Out: 2750 [Urine:2750]    Physical Exam:    General Appearance: NAD, mental status is better  Eyes: PER, conjunctivae and sclerae normal, no icterus  Lungs: respirations regular and unlabored, no crepitus, clear to auscultation  Heart/CV: regular rhythm & normal rate, no murmur, no gallop, no rub + edema, improving  Abdomen: not distended, soft, non-tender, no masses,  bowel sounds present  Skin: No rash, Warm and dry    Radiology:      Renal Imaging:        Labs:  Results from last 7 days   Lab Units 10/20/19  0528 10/19/19  2345 10/19/19  1806 10/19/19  0951   WBC 10*3/mm3 3.78  --   --  6.21   HEMOGLOBIN g/dL 8.3* 8.9* 7.8* 8.3*   HEMATOCRIT % 28.0* 29.8* 26.5* 27.9*   PLATELETS 10*3/mm3 124*  --   --  170     Results from last 7 days   Lab Units 10/20/19  0528 10/19/19  2345 10/19/19  1412 10/19/19  0951   SODIUM mmol/L 138  --   --  133*   POTASSIUM mmol/L 3.1* 3.5 3.1* 3.2*   CHLORIDE mmol/L 102  --   --  95*   CO2 mmol/L 24.0  --   --  27.0   BUN mg/dL 24*  -- "   --  27*   CREATININE mg/dL 2.93*  --   --  3.08*   CALCIUM mg/dL 8.0*  --   --  8.5*   PHOSPHORUS mg/dL 4.7*  --   --   --    MAGNESIUM mg/dL 2.6*  --   --  2.5*   ALBUMIN g/dL 3.00*  --   --  3.60     Results from last 7 days   Lab Units 10/20/19  0528   GLUCOSE mg/dL 89       Results from last 7 days   Lab Units 10/19/19  0951   ALK PHOS U/L 328*   BILIRUBIN mg/dL 1.1   ALT (SGPT) U/L 26   AST (SGOT) U/L 69*           Results from last 7 days   Lab Units 10/19/19  1119   COLOR UA  Yellow   CLARITY UA  Clear   PH, URINE  6.0   SPECIFIC GRAVITY, URINE  1.008   GLUCOSE UA  Negative   KETONES UA  Negative   BILIRUBIN UA  Negative   PROTEIN UA  100 mg/dL (2+)*   BLOOD UA  Large (3+)*   LEUKOCYTES UA  Negative   NITRITE UA  Negative       Estimated Creatinine Clearance: 25.8 mL/min (A) (by C-G formula based on SCr of 2.93 mg/dL (H)).      Assessment       Suspected GI bleed    Coronary artery disease involving native coronary artery of native heart    Essential hypertension    Hypothyroidism (acquired)    Gastroesophageal reflux disease    Hyperlipidemia LDL goal <70    Paroxysmal atrial fibrillation (CMS/[facility])    Acute on chronic combined systolic and diastolic congestive heart failure (CMS/[facility])    NSTEMI (non-ST elevated myocardial infarction) (CMS/[facility])    Acute kidney failure (CMS/[facility])    Anemia due to [place] GI blood loss    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 GI bleed  Hyponatremia secondary to decreased solute intake, bonnie  PAF on  Anticoagulation        Recommendation:  Renal US pending  Replace K  Place amos, needs urology eval  Add flomax 0.8 mg daily  Labs in AM  EGD on Monday  Tessie Benoit DO  10/20/19  7:58 AM

## 2019-10-20 NOTE — PLAN OF CARE
Problem: Skin Injury Risk (Adult)  Goal: Skin Health and Integrity  Outcome: Ongoing (interventions implemented as appropriate)   10/20/19 0609   Skin Injury Risk (Adult)   Skin Health and Integrity making progress toward outcome       Problem: Pain, Chronic (Adult)  Goal: Acceptable Pain/Comfort Level and Functional Ability  Outcome: Ongoing (interventions implemented as appropriate)   10/20/19 0609   Pain, Chronic (Adult)   Acceptable Pain/Comfort Level and Functional Ability making progress toward outcome       Problem: Gastrointestinal Bleeding (Adult)  Goal: Signs and Symptoms of Listed Potential Problems Will be Absent, Minimized or Managed (Gastrointestinal Bleeding)  Outcome: Ongoing (interventions implemented as appropriate)   10/20/19 0609   Goal/Outcome Evaluation   Problems Assessed (GI Bleeding) all   Problems Present (GI Bleeding) fluid imbalance;situational response       Problem: Patient Care Overview  Goal: Plan of Care Review  Outcome: Ongoing (interventions implemented as appropriate)   10/20/19 0609   Coping/Psychosocial   Plan of Care Reviewed With patient   Plan of Care Review   Progress improving   OTHER   Outcome Summary Pt answers orientation questions appropriately and is aware of his surroundings and why he is in the hospital but has illogical random thoughts and conversations. Vital signs stable, remains afebrile. UOP adequate. Pt continues to have urinary retention, I/O cathed x2. Urojets ordered for discomfort. Troponin continues to trend down. Recheck H/H stable (8.9/29.8). EGD planned for Monday. Protonix gtt discontinued, oral PPI started.

## 2019-10-20 NOTE — PLAN OF CARE
Problem: Skin Injury Risk (Adult)  Goal: Skin Health and Integrity  Outcome: Ongoing (interventions implemented as appropriate)      Problem: Pain, Chronic (Adult)  Goal: Acceptable Pain/Comfort Level and Functional Ability  Outcome: Ongoing (interventions implemented as appropriate)      Problem: Gastrointestinal Bleeding (Adult)  Goal: Signs and Symptoms of Listed Potential Problems Will be Absent, Minimized or Managed (Gastrointestinal Bleeding)  Outcome: Ongoing (interventions implemented as appropriate)      Problem: Renal Failure/Kidney Injury, Acute (Adult)  Goal: Signs and Symptoms of Listed Potential Problems Will be Absent, Minimized or Managed (Renal Failure/Kidney Injury, Acute)  Outcome: Ongoing (interventions implemented as appropriate)      Problem: Patient Care Overview  Goal: Plan of Care Review  Outcome: Ongoing (interventions implemented as appropriate)   10/20/19 8001   Coping/Psychosocial   Plan of Care Reviewed With patient   Plan of Care Review   Progress improving   OTHER   Outcome Summary H&H remains stable, troponins trending down, no c/o pain, large amounts of urinary retention continue, orders for amos catheter per renal with follow up urology consult. K 3.1, replacement ordered per renal. Plans for EGD in am.      Goal: Individualization and Mutuality  Outcome: Ongoing (interventions implemented as appropriate)    Goal: Discharge Needs Assessment  Outcome: Ongoing (interventions implemented as appropriate)    Goal: Interprofessional Rounds/Family Conf  Outcome: Ongoing (interventions implemented as appropriate)      Problem: Fall Risk (Adult)  Goal: Identify Related Risk Factors and Signs and Symptoms  Outcome: Outcome(s) achieved Date Met: 10/20/19    Goal: Absence of Fall  Outcome: Ongoing (interventions implemented as appropriate)

## 2019-10-20 NOTE — PROGRESS NOTES
"Intensive Care Follow-up      LOS: 1 day     Mr. Fidencio Zarate Jr., 73 y.o. male is followed for: GI bleed, hallucinations, non-ST elevation MI, and renal failure.     Subjective - Interval History   Fidencio Zarate Jr. is a 73 y.o. male with PMH PAF, Mixed HF, HTN, HLP who presented to [facility] ED today with 2-3 week c/o abdominal cramps and 4-5 days c/o dry nonproductive cough, \"gurgling\" when laying down, confusion, hallucinations and excessive thirst.    He seems more alert today and somewhat more appropriate but does admit he still having some visual hallucinations.  He denies any chest pains.    The patient's relevant past medical, surgical and social history were reviewed and updated in Epic as appropriate.     Objective     Infusions:    sodium chloride 75 mL/hr Last Rate: 75 mL/hr (10/20/19 0240)     Medications:    aspirin 81 mg Oral Daily   midodrine 5 mg Oral TID AC   pantoprazole 40 mg Oral Q AM   rosuvastatin 40 mg Oral Nightly   sodium chloride 10 mL Intravenous Q12H   tamsulosin 0.8 mg Oral Daily     Intake/Output       10/19/19 0700 - 10/20/19 0659    Intake (ml) 1068.8    Output (ml) 2750    Net (ml) -1681.2    Last Weight  93.9 kg (207 lb 0.2 oz)        Vital Sign Min/Max for last 24 hours  Temp  Min: 97.4 °F (36.3 °C)  Max: 98.7 °F (37.1 °C)   BP  Min: 69/46  Max: 123/99   Pulse  Min: 50  Max: 74   Resp  Min: 16  Max: 18   SpO2  Min: 92 %  Max: 100 %   Flow (L/min)  Min: 1  Max: 2        Physical Exam:   GENERAL: Awake and alert at present although the nurse says he sometimes rambles in conversation   HEENT: Normocephalic   LUNGS: Fairly clear   HEART: Normal S1-S2   GI: Soft   EXTREMITIES: No edema   NEURO/PSYCH: No evident focal deficits.    Results from last 7 days   Lab Units 10/20/19  0528 10/19/19  2345 10/19/19  1806 10/19/19  0951   WBC 10*3/mm3 3.78  --   --  6.21   HEMOGLOBIN g/dL 8.3* 8.9* 7.8* 8.3*   PLATELETS 10*3/mm3 124*  --   --  170     Results from last 7 days   Lab Units " 10/20/19  0528 10/19/19  2345 10/19/19  1412 10/19/19  0951   SODIUM mmol/L 138  --   --  133*   POTASSIUM mmol/L 3.1* 3.5 3.1* 3.2*   CO2 mmol/L 24.0  --   --  27.0   BUN mg/dL 24*  --   --  27*   CREATININE mg/dL 2.93*  --   --  3.08*   MAGNESIUM mg/dL 2.6*  --   --  2.5*   PHOSPHORUS mg/dL 4.7*  --   --   --    GLUCOSE mg/dL 89  --   --  106*     Estimated Creatinine Clearance: 25.8 mL/min (A) (by C-G formula based on SCr of 2.93 mg/dL (H)).    Results from last 7 days   Lab Units 10/20/19  0528   HEMOGLOBIN A1C % 5.70*   Urine drug screen was positive for opioids.  He does have prescribed oxycodone        No results found for: LACTATE       Images: No new imaging    I reviewed the patient's results and images.     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  Grade 2 diastolic dysfunction.  Moderate dilation of the aortic root at the sinuses of Valsalva present. Left ventricular wall segments are hypokinetic: apical lateral, basal inferolateral, mid inferolateral, apical inferior, basal inferoseptal, apex hypokinetic and basal inferoseptal. The following left ventricular wall segments are akinetic: basal inferior and mid inferior.  · Presentation to [facility] 10/19/2019 with elevated proBNP in setting of GI bleed, acute kidney injury with elevated creatinine and non-STEMI     • Paroxysmal atrial fibrillation (CMS/[facility])      · Diagnosed 09/2016.  · Chadsvasc=5  · 2-week monitor (03/2019): SVT versus atrial tachycardia.  10% burden PACs  · Intolerant to sotalol due to dizziness, placed on metoprolol  · Chronic anticoagulation with Eliquis  · Presented to [facility] with GI bleed, anemia and acute kidney failure on 10/19/2019 and EKG showed rate controlled atrial fibrillation     • Hyperlipidemia LDL goal <70   • Gastroesophageal reflux disease   • Coronary artery disease involving native coronary artery of native heart     · Cardiac cath for acute MI (10/1984): Severe single-vessel CAD with preserved LVEF.  Data deficit  · Cardiac cath for recurrent MI (1989): Multivessel disease.  Referred for CABG  · CABG (01/1990) reportedly CABG x6.  Data deficit  · Cardiac cath for recurrent angina at [facility] (05/2007): PTCA/stent placement to SVG of the diagonal.  Incomplete visualization of LIMA to LAD.  Occluded SVG to circumflex.  Patent SVG to distal PDA.  High grade stenosis of the vein graft of the first diagonal branch of the left anterior descending artery with occluded vein graft to the left circumflex coronary artery with patent graft to the distal PDA  · Echo (2009): Normal LVEF 55%.  · Cardiac cath (2007): Patent LIMA to LAD.  Patent stents and vein graft of D1 to LAD.  Patent SVG to RCA.  Chronically occluded SVG to circumflex with adequate right to left collaterals.  Unchanged from prior study  · Cardiac cath (6/17/2016): Three-vessel CAD with occlusion of all coronary arteries and patent LIMA and patent SVG x2.  Medical therapy recommended       • Essential hypertension           • Hypothyroidism (acquired)                    Plan      I appreciate the assistance of all consultants.  The patient has significant renal impairment apparently has bladder outlet problems.  Nephrology is placed him on Flomax.    Cardiologist made changes in his medications but may need to consider ischemic evaluation later.    GIs  planning an EGD tomorrow.    We will continue supportive measures.  May need to consider neurology evaluation in view of persistent hallucinations.   Plan of care and goals reviewed with nurse   I discussed the patient's findings and my recommendations with patient and nursing staff      High level of risk due to:  severe exacerbation of chronic illness and illness with threat to life or bodily function.     Kojo menchaca MD  Pulmonary and Critical Care Medicine

## 2019-10-21 PROBLEM — D50.0 CHRONIC BLOOD LOSS ANEMIA: Status: ACTIVE | Noted: 2019-01-01

## 2019-10-21 NOTE — PROGRESS NOTES
"Intensive Care Follow-up     Hospital:  LOS: 2 days   Mr. Fidencio Zarate Jr., 73 y.o. male is followed for:   GI bleed        Subjective   Interval History:  The chart has been reviewed.  I am able to see the patient after he has returned from EGD today where it was found that he had some mild gastritis.  There were reports from nursing that he had been a bit confused through the night although was easily reoriented.  He is drowsy currently but he does answer questions appropriately.    The patient's past medical, surgical and social history were reviewed and updated in Epic as appropriate.        Objective     Infusions:    sodium chloride 75 mL/hr Last Rate: 75 mL/hr (10/21/19 0626)     Medications:    aspirin 81 mg Oral Daily   levothyroxine 75 mcg Oral Q AM   pantoprazole 40 mg Oral Q AM   pharmacy consult - MTM  Does not apply Daily   rosuvastatin 40 mg Oral Nightly   sodium chloride 10 mL Intravenous Q12H   tamsulosin 0.8 mg Oral Daily       Vital Sign Min/Max for last 24 hours  Temp  Min: 97.5 °F (36.4 °C)  Max: 98.5 °F (36.9 °C)   BP  Min: 84/50  Max: 144/67   Pulse  Min: 48  Max: 65   Resp  Min: 14  Max: 20   SpO2  Min: 91 %  Max: 100 %   Flow (L/min)  Min: 1  Max: 2       Input/Output for last 24 hour shift  10/20 0701 - 10/21 0700  In: 1597.5 [P.O.:180; I.V.:1417.5]  Out: 2950 [Urine:2950]      Objective:  General Appearance:  Comfortable, well-appearing and in no acute distress.    Vital signs: (most recent): Blood pressure 125/63, pulse 59, temperature 97.5 °F (36.4 °C), temperature source Temporal, resp. rate 16, height 175.3 cm (69\"), weight 93.9 kg (207 lb 0.2 oz), SpO2 96 %.    HEENT: Normal HEENT exam.    Lungs:  Normal effort and normal respiratory rate.  Breath sounds clear to auscultation.    Heart: Bradycardia.  Regular rhythm.  S1 normal and S2 normal.  No murmur.   Abdomen: Abdomen is soft and non-distended.  Bowel sounds are normal.     Extremities: Normal range of motion.  There is no " dependent edema.    Neurological: (The patient is arousable.  He is oriented x3.).    Pupils:  Pupils are equal, round, and reactive to light.    Skin:  Warm.  No rash.             Results from last 7 days   Lab Units 10/21/19  0313 10/20/19  1203 10/20/19  0528  10/19/19  0951   WBC 10*3/mm3 4.79  --  3.78  --  6.21   HEMOGLOBIN g/dL 8.5* 8.5* 8.3*   < > 8.3*   PLATELETS 10*3/mm3 152  --  124*  --  170    < > = values in this interval not displayed.     Results from last 7 days   Lab Units 10/21/19  0313 10/20/19  0528 10/19/19  2345  10/19/19  0951   SODIUM mmol/L 140 138  --   --  133*   POTASSIUM mmol/L 3.4* 3.1* 3.5   < > 3.2*   CO2 mmol/L 23.0 24.0  --   --  27.0   BUN mg/dL 22 24*  --   --  27*   CREATININE mg/dL 2.87* 2.93*  --   --  3.08*   MAGNESIUM mg/dL  --  2.6*  --   --  2.5*   PHOSPHORUS mg/dL  --  4.7*  --   --   --    GLUCOSE mg/dL 99 89  --   --  106*    < > = values in this interval not displayed.     Estimated Creatinine Clearance: 26.3 mL/min (A) (by C-G formula based on SCr of 2.87 mg/dL (H)).          I reviewed the patient's results and images.     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  Reinitiation of this will need to be decided as an outpatient when the patient's acute needs are fulfilled.  Urology  consult is pending.  Restart Synthroid.  Continue with PPI.  I appreciate the help of nephrology and managing his acute on probable chronic renal insufficiency.  He likely will need to have a cardiac ischemia evaluation at some point in the near future.  Follow-up labs and orders are placed for tomorrow.  We will transition him to telemetry today.    Plan of care and goals reviewed with mulitdisciplinary/antibiotic stewardship team during rounds.   I discussed the patient's findings and my recommendations with patient, nursing staff and consulting provider       Delfino Ambrose MD, [facility]  Pulmonology and Critical Care Medicine

## 2019-10-21 NOTE — ANESTHESIA POSTPROCEDURE EVALUATION
Patient: Fidencio Zarate Jr.    Procedure Summary     Date:  10/21/19 Room / Location:  [facility] ENDOSCOPY 1 /  [facility]    Anesthesia Start:  0901 Anesthesia Stop:      Procedure:  ESOPHAGOGASTRODUODENOSCOPY (N/A ) Diagnosis:       Chronic blood loss anemia      (Chronic blood loss anemia [D50.0])    Surgeon:  Brunner, Mark I, MD Provider:  Rolly Cardenas MD    Anesthesia Type:  general ASA Status:  4          Anesthesia Type: general  Last vitals  /87   Temp 98.4   Pulse 60   Resp 18   SpO2 100     Post Anesthesia Care and Evaluation    Patient location during evaluation: bedside  Patient participation: complete - patient participated  Level of consciousness: awake and alert  Pain score: 0  Pain management: adequate  Airway patency: patent  Anesthetic complications: No anesthetic complications  PONV Status: none  Cardiovascular status: hemodynamically stable and acceptable  Respiratory status: nonlabored ventilation, acceptable and nasal cannula  Hydration status: acceptable

## 2019-10-21 NOTE — PLAN OF CARE
Problem: Patient Care Overview  Goal: Plan of Care Review  Outcome: Ongoing (interventions implemented as appropriate)   10/21/19 9353   Coping/Psychosocial   Plan of Care Reviewed With patient   Plan of Care Review   Progress improving   OTHER   Outcome Summary VSS for shift, remains on room air. EGD this am negative for any signs of bleeding. Needs to f/u outpatient for colonoscopy as well. Urology consult- Dr. Grover recommends d/c with amos short-term and f/u in office within the next week.

## 2019-10-21 NOTE — PROGRESS NOTES
"   LOS: 2 days    Patient Care Team:  Deborah Henson PA-C as PCP - General (Physician Assistant)    Reason For Visit:  F/U BONNIE  Subjective           Review of Systems:    Pulm: No soa   CV:  No CP      Objective       aspirin 81 mg Oral Daily   midodrine 5 mg Oral TID AC   pantoprazole 40 mg Oral Q AM   pharmacy consult - MTM  Does not apply Daily   rosuvastatin 40 mg Oral Nightly   sodium chloride 10 mL Intravenous Q12H   tamsulosin 0.8 mg Oral Daily       sodium chloride 75 mL/hr Last Rate: 75 mL/hr (10/21/19 0626)         Vital Signs:  Blood pressure 144/67, pulse 64, temperature 97.5 °F (36.4 °C), temperature source Temporal, resp. rate 14, height 175.3 cm (69\"), weight 93.9 kg (207 lb 0.2 oz), SpO2 100 %.    Flowsheet Rows      First Filed Value   Admission Height  175.3 cm (69\") Documented at 10/19/2019 0941   Admission Weight  88.5 kg (195 lb) Documented at 10/19/2019 0941          10/20 0701 - 10/21 0700  In: 1597.5 [P.O.:180; I.V.:1417.5]  Out: 2950 [Urine:2950]    Physical Exam:    General Appearance: NAD, alert and cooperative, Ox3  Eyes: PER, conjunctivae and sclerae normal, no icterus  Lungs: respirations regular and unlabored, no crepitus, clear to auscultation  Heart/CV: regular rhythm & normal rate, no murmur, no gallop, no rub and no edema  Abdomen: not distended, soft, non-tender, no masses,  bowel sounds present  Skin: No rash, Warm and dry. : FERRARA.    Radiology: RENAL U/S: NO HYDRONEPHROSIS.            Labs:  Results from last 7 days   Lab Units 10/21/19  0313 10/20/19  1203 10/20/19  0528  10/19/19  0951   WBC 10*3/mm3 4.79  --  3.78  --  6.21   HEMOGLOBIN g/dL 8.5* 8.5* 8.3*   < > 8.3*   HEMATOCRIT % 29.2* 29.4* 28.0*   < > 27.9*   PLATELETS 10*3/mm3 152  --  124*  --  170    < > = values in this interval not displayed.     Results from last 7 days   Lab Units 10/21/19  0313 10/20/19  0528 10/19/19  2345 10/19/19  1412 10/19/19  0951   SODIUM mmol/L 140 138  --   --  133* "   POTASSIUM mmol/L 3.4* 3.1* 3.5 3.1* 3.2*   CHLORIDE mmol/L 106 102  --   --  95*   CO2 mmol/L 23.0 24.0  --   --  27.0   BUN mg/dL 22 24*  --   --  27*   CREATININE mg/dL 2.87* 2.93*  --   --  3.08*   CALCIUM mg/dL 8.0* 8.0*  --   --  8.5*   PHOSPHORUS mg/dL  --  4.7*  --   --   --    MAGNESIUM mg/dL  --  2.6*  --   --  2.5*   ALBUMIN g/dL 2.90* 3.00*  --   --  3.60     Results from last 7 days   Lab Units 10/21/19  0313   GLUCOSE mg/dL 99       Results from last 7 days   Lab Units 10/21/19  0313   ALK PHOS U/L 268*   BILIRUBIN mg/dL 0.7   ALT (SGPT) U/L 17   AST (SGOT) U/L 41*           Results from last 7 days   Lab Units 10/19/19  1119   COLOR UA  Yellow   CLARITY UA  Clear   PH, URINE  6.0   SPECIFIC GRAVITY, URINE  1.008   GLUCOSE UA  Negative   KETONES UA  Negative   BILIRUBIN UA  Negative   PROTEIN UA  100 mg/dL (2+)*   BLOOD UA  Large (3+)*   LEUKOCYTES UA  Negative   NITRITE UA  Negative       Estimated Creatinine Clearance: 26.3 mL/min (A) (by C-G formula based on SCr of 2.87 mg/dL (H)).      Assessment       Suspected GI bleed    Coronary artery disease involving native coronary artery of native heart    Essential hypertension    Hypothyroidism (acquired)    Gastroesophageal reflux disease    Hyperlipidemia LDL goal <70    Paroxysmal atrial fibrillation (CMS/[facility])    Acute on chronic combined systolic and diastolic congestive heart failure (CMS/[facility])    NSTEMI (non-ST elevated myocardial infarction) (CMS/[facility])    Acute kidney failure (CMS/[facility])    Anemia due to [place] GI blood loss    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 SLIGHT IMPROVEMENT IN GFR. URINARY RETENTION.            Recommendations: UROLOGY CONSULT.      Toni Stokes MD  10/21/19  10:05 AM

## 2019-10-21 NOTE — PROGRESS NOTES
Discharge Planning Assessment  [facility]     Patient Name: Fidencio Zarate Jr.  MRN: [id number]  Today's Date: 10/21/2019    Admit Date: 10/19/2019    Discharge Needs Assessment     Row Name 10/21/19 1148       Living Environment    Lives With  significant other    Current Living Arrangements  home/apartment/condo    Primary Care Provided by  self    Provides Primary Care For  no one    Family Caregiver if Needed  significant other    Quality of Family Relationships  supportive    Able to Return to Prior Arrangements  yes       Resource/Environmental Concerns    Transportation Concerns  car, none       Transition Planning    Patient/Family Anticipates Transition to  home    Patient/Family Anticipated Services at Transition  none    Transportation Anticipated  family or friend will provide       Discharge Needs Assessment    Readmission Within the Last 30 Days  no previous admission in last 30 days    Concerns to be Addressed  denies needs/concerns at this time    Equipment Currently Used at Home  none    Anticipated Changes Related to Illness  none    Equipment Needed After Discharge  none        Discharge Plan     Row Name 10/21/19 1159       Plan    Plan  Home    Patient/Family in Agreement with Plan  yes    Plan Comments  Spoke with patient at bedside.  Patient resides in [place] and lives with his significant other.  Prior to admission patient was independent with ADL's and mobility.  Patient does not have any DME and has never used home health.  Patient plans to return home upon dicharge and denies any needs.  CM will continue to follow.        Destination      No service coordination in this encounter.      Durable Medical Equipment      No service coordination in this encounter.      Dialysis/Infusion      No service coordination in this encounter.      Home Medical Care      No service coordination in this encounter.      Therapy      No service coordination in this encounter.      Community  Resources      No service coordination in this encounter.        Expected Discharge Date and Time     Expected Discharge Date Expected Discharge Time    Oct 26, 2019         Demographic Summary     Row Name 10/21/19 1148       General Information    Admission Type  inpatient    Arrived From  home    Referral Source  admission list    Reason for Consult  discharge planning    Preferred Language  English       Contact Information    Permission Granted to Share Info With          Functional Status    No documentation.       Psychosocial    No documentation.       Abuse/Neglect    No documentation.       Legal    No documentation.       Substance Abuse    No documentation.       Patient Forms    No documentation.           Eugenia Miramontes RN

## 2019-10-21 NOTE — PLAN OF CARE
Problem: Skin Injury Risk (Adult)  Goal: Skin Health and Integrity  Outcome: Ongoing (interventions implemented as appropriate)   10/21/19 0733   Skin Injury Risk (Adult)   Skin Health and Integrity making progress toward outcome       Problem: Gastrointestinal Bleeding (Adult)  Goal: Signs and Symptoms of Listed Potential Problems Will be Absent, Minimized or Managed (Gastrointestinal Bleeding)  Outcome: Ongoing (interventions implemented as appropriate)   10/21/19 0733   Goal/Outcome Evaluation   Problems Assessed (GI Bleeding) all   Problems Present (GI Bleeding) situational response       Problem: Renal Failure/Kidney Injury, Acute (Adult)  Goal: Signs and Symptoms of Listed Potential Problems Will be Absent, Minimized or Managed (Renal Failure/Kidney Injury, Acute)  Outcome: Ongoing (interventions implemented as appropriate)      Problem: Patient Care Overview  Goal: Plan of Care Review  Outcome: Ongoing (interventions implemented as appropriate)   10/21/19 0733   Coping/Psychosocial   Plan of Care Reviewed With patient   Plan of Care Review   Progress improving   OTHER   Outcome Summary Vital signs stable. Pt on room air. Afebrile. UOP 1550. Pt went NPO at [facility] for EGD today. Pt oriented x4 and having more appropriate conversations.        Problem: Fall Risk (Adult)  Goal: Absence of Fall  Outcome: Ongoing (interventions implemented as appropriate)   10/21/19 0733   Fall Risk (Adult)   Absence of Fall making progress toward outcome

## 2019-10-21 NOTE — BRIEF OP NOTE
ESOPHAGOGASTRODUODENOSCOPY  Progress Note    Fidencio Zarate .  10/21/2019    EGD shows atrophic gastritis. No blood or source of blood loss seen. Biopsies taken for H pylori and celiac disease.    >> Recommend OP colonoscopy. Will schedule appointment with [facility] to discuss. Will need to be off Eliquis.    Will sign off. Please call with questions or concerns.      Mark I. Brunner, MD     Date: 10/21/2019  Time: 9:27 AM

## 2019-10-21 NOTE — CONSULTS
Consult    Referring Provider:No ref. provider found  Reason for Consultation: REetention    Patient Care Team:  Deborah Henson PA-C as PCP - General (Physician Assistant)    Chief complaint   Chief Complaint   Patient presents with   • Altered Mental Status       Subjective .     History of present illness:  I was asked by Dr. Beck to evaluate this patient who is a 73-year-old gentleman who is being admitted to the hospital here.  He was noted to have some changes in his voiding habits and increased residual urine.  He was having intermittent catheterization preformed and ultimately had a catheter placed.  The patient, per his wife, has had longstanding use of Flomax.  He has had no prostate surgeries.  She is not aware of any prostate cancer.  The patient is presently on the commode and attempting to have a bowel movement.  History is obtained by the wife, nurse and chart.  She denies any dysuria or hematuria prior to admission.  She felt he has been voiding all right, but when he had his mental status changes, which resulted in his hospitalization, she noticed change in his voiding pattern.        Past Medical History:   Diagnosis Date   • Arthritis    • Cancer (CMS/HCC)     thoat cancer, s/p chemo and XRT   • Coronary artery disease    • Gout    • History of chemotherapy    • Hyperlipidemia    • Hypertension    • Injury of back    • Low back pain    • Myocardial infarction (CMS/HCC)     x2   • PAF (paroxysmal atrial fibrillation) (CMS/HCC)    • Rotator cuff rupture    • Uses hearing aid     bilat      Past Surgical History:   Procedure Laterality Date   • APPENDECTOMY     • CARDIAC CATHETERIZATION      x1 stent   • CARDIAC CATHETERIZATION N/A 6/17/2016    Procedure: Left Heart Cath;  Surgeon: Son Lam MD;  Location: [facility] CATH INVASIVE LOCATION;  Service:    • CARDIAC SURGERY     • CATARACT EXTRACTION  2009   • CHOLECYSTECTOMY LAPAROSCOPIC INTRAOPERATIVE CHOLANGIOGRAM WITH LIVER BIOPSY N/A  7/8/2016    Procedure: CHOLECYSTECTOMY LAPAROSCOPIC WITH LIVER BIOPSY;  Surgeon: Son Andrade MD;  Location: [facility] OR;  Service:    • COLONOSCOPY      x2   • CORONARY ARTERY BYPASS GRAFT     • FOOT SURGERY  1962    right    • FRACTURE SURGERY     • LASIK     • OR ERCP DX COLLECTION SPECIMEN BRUSHING/WASHING N/A 6/24/2016    Procedure: ENDOSCOPIC RETROGRADE CHOLANGIOPANCREATOGRAPHY;  Surgeon: Omari Cardenas MD;  Location: [facility] ENDOSCOPY;  Service: Gastroenterology   • TONSILLECTOMY     • WISDOM TOOTH EXTRACTION           Current Facility-Administered Medications:   •  aspirin chewable tablet 81 mg, 81 mg, Oral, Daily, Rosanna Logan, APRN, 81 mg at 10/21/19 1123  •  levothyroxine (SYNTHROID, LEVOTHROID) tablet 75 mcg, 75 mcg, Oral, Q AM, Delfion Ambrose MD, 75 mcg at 10/21/19 1123  •  lidocaine (XYLOCAINE) gel, , Urethral, PRN, Hannah Ruth, APRN  •  nitroglycerin (NITROSTAT) SL tablet 0.4 mg, 0.4 mg, Sublingual, PRN, Rosanna Logan, APRN  •  ondansetron (ZOFRAN) injection 4 mg, 4 mg, Intravenous, Q6H PRN, Meghan Gunderson APRKOREY  •  pantoprazole (PROTONIX) EC tablet 40 mg, 40 mg, Oral, Q AM, Brunner, Mark I, MD, 40 mg at 10/21/19 0630  •  Pharmacy Consult - [facility], , Does not apply, Daily, Sarah Rosario, [facility]  •  rosuvastatin (CRESTOR) tablet 40 mg, 40 mg, Oral, Nightly, Margaret Loganly, APRN, 40 mg at 10/20/19 2104  •  sodium chloride 0.9 % flush 10 mL, 10 mL, Intravenous, Q12H, Margaret Loganly, APRN, 10 mL at 10/20/19 2104  •  sodium chloride 0.9 % flush 10 mL, 10 mL, Intravenous, PRN, Cathy Loganberly, APRN  •  sodium chloride 0.9 % infusion, 75 mL/hr, Intravenous, Continuous, Tessie Benoit, DO, Last Rate: 75 mL/hr at 10/21/19 0626, 75 mL/hr at 10/21/19 0626  •  tamsulosin (FLOMAX) 24 hr capsule 0.8 mg, 0.8 mg, Oral, Daily, Tessie Benoit DO, 0.8 mg at 10/21/19 1123  Penicillins; Lipitor [atorvastatin]; Metformin; Sotalol; and Zocor  "[simvastatin]    Review of Systems  Pertinent items are noted in HPI, all other systems reviewed and negative    Objective     Vital Signs   /69   Pulse 63   Temp 98.3 °F (36.8 °C) (Axillary)   Resp 16   Ht 175.3 cm (69\")   Wt 93.9 kg (207 lb 0.2 oz)   SpO2 99%   BMI 30.57 kg/m²     Physical Exam:  General Appearance  Back: No kyphosis present, no scoliosis present,no tenderness to percussion or Palpation  Lungs: Respirations regular, even and  unlabored    Chest Wall: No abnormalities observed  Abdomen: No masses, no organomegaly, soft   non-tender, non-distended, no guarding, no rebound  Genital FC present  Extremities: Moves all extremities well, no edema, no cyanosis, no redness  Skin: No bleeding, bruising or rash  Neurologic: Cranial Nerves 1-12 grossly intact    Results Review:  Lab Results (last 24 hours)     Procedure Component Value Units Date/Time    Blood Culture - Blood, Wrist, Right [[id number]] Collected:  10/19/19 1503    Specimen:  Blood from Wrist, Right Updated:  10/21/19 1515     Blood Culture No growth at 2 days    Tissue Pathology Exam [[id number]] Collected:  10/21/19 0917    Specimen:  Tissue from Small Intestine; Tissue from Gastric, Antrum Updated:  10/21/19 0938    Comprehensive Metabolic Panel [[id number]]  (Abnormal) Collected:  10/21/19 0313    Specimen:  Blood Updated:  10/21/19 0441     Glucose 99 mg/dL      BUN 22 mg/dL      Creatinine 2.87 mg/dL      Sodium 140 mmol/L      Potassium 3.4 mmol/L      Chloride 106 mmol/L      CO2 23.0 mmol/L      Calcium 8.0 mg/dL      Total Protein 5.6 g/dL      Albumin 2.90 g/dL      ALT (SGPT) 17 U/L      AST (SGOT) 41 U/L      Alkaline Phosphatase 268 U/L      Total Bilirubin 0.7 mg/dL      eGFR Non African Amer 22 mL/min/1.73      Globulin 2.7 gm/dL      A/G Ratio 1.1 g/dL      BUN/Creatinine Ratio 7.7     Anion Gap 11.0 mmol/L     Narrative:       GFR Normal >60  Chronic Kidney Disease <60  Kidney Failure <15    CBC (No Diff) " [[id number]]  (Abnormal) Collected:  10/21/19 0313    Specimen:  Blood Updated:  10/21/19 0424     WBC 4.79 10*3/mm3      RBC 3.46 10*6/mm3      Hemoglobin 8.5 g/dL      Hematocrit 29.2 %      MCV 84.4 fL      MCH 24.6 pg      MCHC 29.1 g/dL      RDW 17.6 %      RDW-SD 53.5 fl      MPV 10.8 fL      Platelets 152 10*3/mm3         Imaging Results (last 72 hours)     Procedure Component Value Units Date/Time     Renal Limited [[id number]] Collected:  10/20/19 1316     Updated:  10/21/19 0913    Narrative:       EXAMINATION: US RENAL LIMITED - 10/20/2019     INDICATION: I21.4-Non-ST elevation (NSTEMI) myocardial infarction;  K92.2-Gastrointestinal hemorrhage, unspecified; D50.0-Iron deficiency  anemia secondary to blood loss (chronic); N17.9-Acute kidney failure,  unspecified; I95.9-Hypotension, unspecified. Renal insufficiency.     TECHNIQUE: Sonographic imaging is obtained of the kidneys in both the  sagittal and transverse planes.     COMPARISON: None.     FINDINGS: Small pleural effusion identified of the right. The right  kidney measures in length from pole to pole 9.7 cm. There is no solid  cortical mass or renal cortical cysts seen within the right kidney. No  hydronephrosis or nephrolithiasis.     The left kidney measures in length from pole to pole 9.7 cm. There is no  solid cortical mass or renal cortical cyst seen within the left kidney.  No hydronephrosis or nephrolithiasis. Finney catheter balloon seen within  the bladder.       Impression:       Incidental small right pleural effusion. Both kidneys are  grossly unremarkable in appearance. Finney catheter balloon within within  the bladder.      DICTATED:   10/20/2019  EDITED/ls :   10/20/2019      This report was finalized on 10/21/2019 9:10 AM by Dr. Miriam Middleton MD.       XR Chest 1 View [[id number]] Collected:  10/19/19 1044     Updated:  10/19/19 1713    Narrative:          EXAMINATION: XR CHEST 1 VW - 10/19/2019     INDICATION: Weakness,  dizziness, altered mental status.     COMPARISON: 04/07/2017     FINDINGS: Portable chest reveals heart to be enlarged. Underlying  chronic and emphysematous changes in lung fields bilaterally.  Degenerative changes seen within the spine. Patient is status post  median sternotomy. No pleural effusion or pneumothorax.           Impression:       Heart is enlarged with no evidence of acute parenchymal  disease.     DICTATED:   10/19/2019  EDITED/ls :   10/19/2019      This report was finalized on 10/19/2019 5:10 PM by Dr. Miriam Middleton MD.              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  We can see him later in the week or early next week for a voiding trial.  I will have him continue on his Flomax.  At some point in time, he may benefit from a cystoscopy to evaluate the lower urinary tract.  They are agreeable and will see him at followup for voiding trial.      Rodrigue Grover MD  10/21/19  4:39 PM    Time:

## 2019-10-21 NOTE — PROGRESS NOTES
Clinical Nutrition     Multidisciplinary Rounds      Patient Name: Fidencio Zarate Jr.  Date of Encounter: 10/21/19 11:01 AM  MRN: [id number]  Admission date: 10/19/2019    DEANA JOYNER to continue to follow per protocol.     Pt npo for EGD today    Current diet: NPO Diet NPO Except: Sips With Meds  No active supplement orders    EMR reviewed     Intervention:  Follow treatment plan  Care plan reviewed    Follow up:   Per protocol      Raisa Bernal RD  11:01 AM  Time: 15min

## 2019-10-21 NOTE — PROGRESS NOTES
[facility]       LOS: 2 days   Patient Care Team:  Deborah Henson PA-C as PCP - General (Physician Assistant)        Subjective       Patient Denies:  Cp, sob, palpitations.      Vital Signs  Temp:  [97.5 °F (36.4 °C)-98.5 °F (36.9 °C)] 97.5 °F (36.4 °C)  Heart Rate:  [48-65] 64  Resp:  [14-20] 14  BP: ()/(50-82) 144/67    Intake/Output Summary (Last 24 hours) at 10/21/2019 0955  Last data filed at 10/21/2019 0920  Gross per 24 hour   Intake 2137.5 ml   Output 2950 ml   Net -812.5 ml     I/O this shift:  In: 600 [I.V.:600]  Out: -     Physical Exam:     General Appearance:    Alert, cooperative, in no acute distress       Neck:   No adenopathy, supple, trachea midline, no thyromegaly, no JVD       Lungs:     Clear to auscultation,respirations regular, even and                  unlabored    Heart:    Regular rhythm and normal rate, normal S1 and S2, 1/6            murmur, no gallop, no rub, no click   Chest Wall:    No abnormalities observed   Abdomen:     Normal bowel sounds, no masses, no organomegaly, soft        non-tender, non-distended       Extremities:   Moves all extremities well, no edema, no cyanosis, no             redness   Pulses:   Pulses palpable and equal bilaterally     Results Review:     I reviewed the patient's new clinical results.      WBC WBC   Date/Time Value Ref Range Status   10/21/2019 0313 4.79 3.40 - 10.80 10*3/mm3 Final   10/20/2019 0528 3.78 3.40 - 10.80 10*3/mm3 Final   10/19/2019 0951 6.21 3.40 - 10.80 10*3/mm3 Final            HGB Hemoglobin   Date/Time Value Ref Range Status   10/21/2019 0313 8.5 (L) 13.0 - 17.7 g/dL Final   10/20/2019 1203 8.5 (L) 13.0 - 17.7 g/dL Final   10/20/2019 0528 8.3 (L) 13.0 - 17.7 g/dL Final   10/19/2019 2345 8.9 (L) 13.0 - 17.7 g/dL Final   10/19/2019 1806 7.8 (L) 13.0 - 17.7 g/dL Final   10/19/2019 0951 8.3 (L) 13.0 - 17.7 g/dL Final           HCT Hematocrit   Date/Time  Value Ref Range Status   10/21/2019 0313 29.2 (L) 37.5 - 51.0 % Final   10/20/2019 1203 29.4 (L) 37.5 - 51.0 % Final   10/20/2019 0528 28.0 (L) 37.5 - 51.0 % Final   10/19/2019 2345 29.8 (L) 37.5 - 51.0 % Final   10/19/2019 1806 26.5 (L) 37.5 - 51.0 % Final   10/19/2019 0951 27.9 (L) 37.5 - 51.0 % Final            Platlets Platelets   Date/Time Value Ref Range Status   10/21/2019 0313 152 140 - 450 10*3/mm3 Final   10/20/2019 0528 124 (L) 140 - 450 10*3/mm3 Final   10/19/2019 0951 170 140 - 450 10*3/mm3 Final     Sodium  Sodium   Date/Time Value Ref Range Status   10/21/2019 0313 140 136 - 145 mmol/L Final   10/20/2019 0528 138 136 - 145 mmol/L Final   10/19/2019 0951 133 (L) 136 - 145 mmol/L Final     Potassium  Potassium   Date/Time Value Ref Range Status   10/21/2019 0313 3.4 (L) 3.5 - 5.2 mmol/L Final   10/20/2019 0528 3.1 (L) 3.5 - 5.2 mmol/L Final   10/19/2019 2345 3.5 3.5 - 5.2 mmol/L Final   10/19/2019 1412 3.1 (L) 3.5 - 5.2 mmol/L Final   10/19/2019 0951 3.2 (L) 3.5 - 5.2 mmol/L Final     Chloride  Chloride   Date/Time Value Ref Range Status   10/21/2019 0313 106 98 - 107 mmol/L Final   10/20/2019 0528 102 98 - 107 mmol/L Final   10/19/2019 0951 95 (L) 98 - 107 mmol/L Final     BicarbonateNo results found for: PLASMABICARB    BUN BUN   Date/Time Value Ref Range Status   10/21/2019 0313 22 8 - 23 mg/dL Final   10/20/2019 0528 24 (H) 8 - 23 mg/dL Final   10/19/2019 0951 27 (H) 8 - 23 mg/dL Final      Creatinine Creatinine   Date/Time Value Ref Range Status   10/21/2019 0313 2.87 (H) 0.76 - 1.27 mg/dL Final   10/20/2019 0528 2.93 (H) 0.76 - 1.27 mg/dL Final   10/19/2019 0951 3.08 (H) 0.76 - 1.27 mg/dL Final      Calcium Calcium   Date/Time Value Ref Range Status   10/21/2019 0313 8.0 (L) 8.6 - 10.5 mg/dL Final   10/20/2019 0528 8.0 (L) 8.6 - 10.5 mg/dL Final   10/19/2019 0951 8.5 (L) 8.6 - 10.5 mg/dL Final      Mag @RESULFAST(MG:3)@        PT/INR:       Lab Results   Component Value Date    PROTIME 13.4 (H)  06/16/2016    INR 1.22 06/16/2016      Troponin I:    Lab Results   Component Value Date    TROPONINI 0.471 06/16/2016    TROPONINI 0.436 06/16/2016    TROPONINI 0.392 06/15/2016    Lab Results   Component Value Date    CKTOTAL 138 10/19/2019    POCRTROPI 0.00 04/07/2017    TROPONINT 5.570 (C) 10/19/2019         aspirin 81 mg Oral Daily   midodrine 5 mg Oral TID AC   pantoprazole 40 mg Oral Q AM   pharmacy consult - MTM  Does not apply Daily   rosuvastatin 40 mg Oral Nightly   sodium chloride 10 mL Intravenous Q12H   tamsulosin 0.8 mg Oral Daily       sodium chloride 75 mL/hr Last Rate: 75 mL/hr (10/21/19 0626)       Assessment/Plan     Patient Active Problem List   Diagnosis Code   • Coronary artery disease involving native coronary artery of native heart I25.10   • Essential hypertension I10   • COPD (chronic obstructive pulmonary disease) (CMS/[facility]) J44.9   • Hypothyroidism (acquired) E03.9   • Gastroesophageal reflux disease K21.9   • Hyperlipidemia LDL goal <70 E78.5   • Hypogonadism in male E29.1   • Malignant neoplasm of larynx (CMS/[facility]) C32.9   • Shoulder joint pain M25.519   • Mild obesity E66.9   • Vitamin D deficiency E55.9   • Headache syndrome G44.89   • Paroxysmal atrial fibrillation (CMS/HCC) I48.0   • Chronic pain of left knee M25.562, G89.29   • Chronic pain syndrome G89.4   • Chronic back pain M54.9, G89.29   • Palpitations R00.2   • Acute on chronic combined systolic and diastolic congestive heart failure (CMS/[facility]) I50.43   • Suspected GI bleed K92.2   • NSTEMI (non-ST elevated myocardial infarction) (CMS/[facility]) I21.4   • Acute kidney failure (CMS/[facility]) N17.9   • Anemia due to [place] GI blood loss D50.0   • Altered mental state R41.82   • 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

## 2019-10-21 NOTE — ANESTHESIA PREPROCEDURE EVALUATION
Anesthesia Evaluation     Patient summary reviewed and Nursing notes reviewed   NPO Solid Status: > 8 hours  NPO Liquid Status: > 8 hours           Airway   Mallampati: II  TM distance: >3 FB  Neck ROM: limited  Possible difficult intubation  Dental      Pulmonary    (+) COPD,   Cardiovascular     ECG reviewed    (+) hypertension, past MI (Non STEMI 9/19) , CAD, CABG >6 Months, cardiac stents more than 12 months ago dysrhythmias Paroxysmal Atrial Fib, CHF, hyperlipidemia,     ROS comment: EKG NSR PVC's Inc RBBB   Nonspecific ST and T wave abnormality    ECHO 9/2019 EF 56%   ECHO 3/2019 wall segments are hypokinetic and akinetic  LV cavity is mild-to-moderately dilated. EF = 40%.  LV DD (grade II) consistent with pseudonormalization.    CATH  2016 EF=48%.  Occlusion of all coronary arteries.  Patent LIMA and SVGs times 2  (LAD, Diagonal,PDA).  Myocardium in jeopardy in the distribution of the circumflex, right TURNER, and diagonal ; Med Rx recc    CABG 1990 stent  2010        Neuro/Psych  (+) headaches, psychiatric history,       ROS Comment: Confusion c hallucinations   GI/Hepatic/Renal/Endo    (+) obesity,  GERD, GI bleeding, renal disease (creat 2.9 ), hypothyroidism,   (-) morbid obesity    Musculoskeletal     (+) chronic pain,   Abdominal    Substance History   (+) drug use  Alcohol use: in past      OB/GYN          Other   (+) arthritis   history of cancer (larynx ca)    ROS/Med Hx Other: MS oxycodone     Admitted 9/19 with :   Non STEMI   confusion hallucinations   elevated creatnine 2.9    Gi Bleed HCT 29      Phys Exam Other: Mac 3 grade IIb view - post artynoids only                 Anesthesia Plan    ASA 4     general   (Hi risk : low propofol (jose angel for >MAP 65) )  intravenous induction   Anesthetic plan, all risks, benefits, and alternatives have been provided, discussed and informed consent has been obtained with: patient.    Plan discussed with CRNA.

## 2019-10-22 PROBLEM — R33.8 ACUTE URINARY RETENTION: Status: ACTIVE | Noted: 2019-01-01

## 2019-10-22 NOTE — PROGRESS NOTES
[facility]       LOS: 3 days   Patient Care Team:  Deborah Henson PA-C as PCP - General (Physician Assistant)        Subjective       Patient Denies:  Cp, sob, palpitations.  Wants to go home      Vital Signs  Temp:  [98.3 °F (36.8 °C)-98.7 °F (37.1 °C)] 98.7 °F (37.1 °C)  Heart Rate:  [54-71] 71  Resp:  [16-18] 16  BP: (111-150)/(55-98) 133/71    Intake/Output Summary (Last 24 hours) at 10/22/2019 0922  Last data filed at 10/22/2019 0800  Gross per 24 hour   Intake 1090 ml   Output 2775 ml   Net -1685 ml     I/O this shift:  In: -   Out: 400 [Urine:400]    Physical Exam:     General Appearance:    Alert, cooperative, in no acute distress       Neck:   No adenopathy, supple, trachea midline, no thyromegaly, no JVD       Lungs:     Clear to auscultation,respirations regular, even and                  unlabored    Heart:    Regular rhythm and normal rate, normal S1 and S2, 1/6            murmur, no gallop, no rub, no click   Chest Wall:    No abnormalities observed   Abdomen:     Normal bowel sounds, no masses, no organomegaly, soft        non-tender, non-distended       Extremities:   Moves all extremities well, no edema, no cyanosis, no             redness   Pulses:   Pulses palpable and equal bilaterally     Results Review:     I reviewed the patient's new clinical results.      WBC WBC   Date/Time Value Ref Range Status   10/22/2019 0345 5.08 3.40 - 10.80 10*3/mm3 Final   10/21/2019 0313 4.79 3.40 - 10.80 10*3/mm3 Final   10/20/2019 0528 3.78 3.40 - 10.80 10*3/mm3 Final   10/19/2019 0951 6.21 3.40 - 10.80 10*3/mm3 Final            HGB Hemoglobin   Date/Time Value Ref Range Status   10/22/2019 0345 8.3 (L) 13.0 - 17.7 g/dL Final   10/21/2019 0313 8.5 (L) 13.0 - 17.7 g/dL Final   10/20/2019 1203 8.5 (L) 13.0 - 17.7 g/dL Final   10/20/2019 0528 8.3 (L) 13.0 - 17.7 g/dL Final   10/19/2019 2345 8.9 (L) 13.0 - 17.7 g/dL Final   10/19/2019 1806 7.8  (L) 13.0 - 17.7 g/dL Final   10/19/2019 0951 8.3 (L) 13.0 - 17.7 g/dL Final           HCT Hematocrit   Date/Time Value Ref Range Status   10/22/2019 0345 27.7 (L) 37.5 - 51.0 % Final   10/21/2019 0313 29.2 (L) 37.5 - 51.0 % Final   10/20/2019 1203 29.4 (L) 37.5 - 51.0 % Final   10/20/2019 0528 28.0 (L) 37.5 - 51.0 % Final   10/19/2019 2345 29.8 (L) 37.5 - 51.0 % Final   10/19/2019 1806 26.5 (L) 37.5 - 51.0 % Final   10/19/2019 0951 27.9 (L) 37.5 - 51.0 % Final            Platlets Platelets   Date/Time Value Ref Range Status   10/22/2019 0345 156 140 - 450 10*3/mm3 Final   10/21/2019 0313 152 140 - 450 10*3/mm3 Final   10/20/2019 0528 124 (L) 140 - 450 10*3/mm3 Final   10/19/2019 0951 170 140 - 450 10*3/mm3 Final     Sodium  Sodium   Date/Time Value Ref Range Status   10/22/2019 0345 138 136 - 145 mmol/L Final   10/21/2019 0313 140 136 - 145 mmol/L Final   10/20/2019 0528 138 136 - 145 mmol/L Final   10/19/2019 0951 133 (L) 136 - 145 mmol/L Final     Potassium  Potassium   Date/Time Value Ref Range Status   10/22/2019 0345 3.3 (L) 3.5 - 5.2 mmol/L Final   10/21/2019 0313 3.4 (L) 3.5 - 5.2 mmol/L Final   10/20/2019 0528 3.1 (L) 3.5 - 5.2 mmol/L Final   10/19/2019 2345 3.5 3.5 - 5.2 mmol/L Final   10/19/2019 1412 3.1 (L) 3.5 - 5.2 mmol/L Final   10/19/2019 0951 3.2 (L) 3.5 - 5.2 mmol/L Final     Chloride  Chloride   Date/Time Value Ref Range Status   10/22/2019 0345 104 98 - 107 mmol/L Final   10/21/2019 0313 106 98 - 107 mmol/L Final   10/20/2019 0528 102 98 - 107 mmol/L Final   10/19/2019 0951 95 (L) 98 - 107 mmol/L Final     BicarbonateNo results found for: PLASMABICARB    BUN BUN   Date/Time Value Ref Range Status   10/22/2019 0345 20 8 - 23 mg/dL Final   10/21/2019 0313 22 8 - 23 mg/dL Final   10/20/2019 0528 24 (H) 8 - 23 mg/dL Final   10/19/2019 0951 27 (H) 8 - 23 mg/dL Final      Creatinine Creatinine   Date/Time Value Ref Range Status   10/22/2019 0345 2.59 (H) 0.76 - 1.27 mg/dL Final   10/21/2019 0313 2.87  (H) 0.76 - 1.27 mg/dL Final   10/20/2019 0528 2.93 (H) 0.76 - 1.27 mg/dL Final   10/19/2019 0951 3.08 (H) 0.76 - 1.27 mg/dL Final      Calcium Calcium   Date/Time Value Ref Range Status   10/22/2019 0345 7.9 (L) 8.6 - 10.5 mg/dL Final   10/21/2019 0313 8.0 (L) 8.6 - 10.5 mg/dL Final   10/20/2019 0528 8.0 (L) 8.6 - 10.5 mg/dL Final   10/19/2019 0951 8.5 (L) 8.6 - 10.5 mg/dL Final      Mag @RESULFAST(MG:3)@        PT/INR:       Lab Results   Component Value Date    PROTIME 13.4 (H) 06/16/2016    INR 1.22 06/16/2016      Troponin I:    Lab Results   Component Value Date    TROPONINI 0.471 06/16/2016    TROPONINI 0.436 06/16/2016    TROPONINI 0.392 06/15/2016      Lab Results   Component Value Date    CKTOTAL 138 10/19/2019    POCRTROPI 0.00 04/07/2017    TROPONINT 5.570 (C) 10/19/2019         aspirin 81 mg Oral Daily   levothyroxine 75 mcg Oral Q AM   pantoprazole 40 mg Oral Q AM   pharmacy consult - MTM  Does not apply Daily   potassium chloride 40 mEq Oral Once   rosuvastatin 40 mg Oral Nightly   sodium chloride 10 mL Intravenous Q12H   tamsulosin 0.8 mg Oral Daily       sodium chloride 75 mL/hr Last Rate: 75 mL/hr (10/21/19 0626)       Assessment/Plan     Patient Active Problem List   Diagnosis Code   • Coronary artery disease involving native coronary artery of native heart I25.10   • Essential hypertension I10   • COPD (chronic obstructive pulmonary disease) (CMS/HCC) J44.9   • Hypothyroidism (acquired) E03.9   • Gastroesophageal reflux disease K21.9   • Hyperlipidemia LDL goal <70 E78.5   • Hypogonadism in male E29.1   • Malignant neoplasm of larynx (CMS/HCC) C32.9   • Shoulder joint pain M25.519   • Mild obesity E66.9   • Vitamin D deficiency E55.9   • Headache syndrome G44.89   • Paroxysmal atrial fibrillation (CMS/[facility]) I48.0   • Chronic pain of left knee M25.562, G89.29   • Chronic pain syndrome G89.4   • Chronic back pain M54.9, G89.29   • Palpitations R00.2   • Acute on chronic combined systolic and  diastolic congestive heart failure (CMS/[facility]) I50.43   • Suspected GI bleed K92.2   • NSTEMI (non-ST elevated myocardial infarction) (CMS/[facility]) I21.4   • Acute kidney failure (CMS/[facility]) N17.9   • Anemia due to [place] GI blood loss D50.0   • Altered mental state R41.82   • 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

## 2019-10-22 NOTE — NURSING NOTE
[facility]    Patient Name:  Fidencio Zarate Jr.  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  Grade 2 diastolic dysfunction.  Moderate dilation of the aortic root at the sinuses of Valsalva present. Left ventricular wall segments are hypokinetic: apical lateral, basal inferolateral, mid inferolateral, apical inferior, basal inferoseptal, apex hypokinetic and basal inferoseptal. The following left ventricular wall segments are akinetic: basal inferior and mid inferior.  · Presentation to [facility] 10/19/2019 with elevated proBNP in setting of GI bleed, acute kidney injury with elevated creatinine and non-STEMI     • Paroxysmal atrial fibrillation (CMS/HCC)     · Diagnosed 2016.  · Chadsvasc=5  · 2-week monitor (2019): SVT versus atrial tachycardia.  10% burden PACs  · Intolerant to sotalol due to dizziness, placed on metoprolol  · Chronic anticoagulation with Eliquis  · Presented to [facility] with GI bleed, anemia and acute kidney failure on 10/19/2019 and EKG showed rate controlled atrial fibrillation     • Hyperlipidemia LDL goal <70   • Gastroesophageal reflux disease   • Coronary artery disease involving native coronary artery of native heart     · Cardiac cath for acute MI  (10/1984): Severe single-vessel CAD with preserved LVEF.  Data deficit  · Cardiac cath for recurrent MI (1989): Multivessel disease.  Referred for CABG  · CABG (01/1990) reportedly CABG x6.  Data deficit  · Cardiac cath for recurrent angina at [facility] (05/2007): PTCA/stent placement to SVG of the diagonal.  Incomplete visualization of LIMA to LAD.  Occluded SVG to circumflex.  Patent SVG to distal PDA.  High grade stenosis of the vein graft of the first diagonal branch of the left anterior descending artery with occluded vein graft to the left circumflex coronary artery with patent graft to the distal PDA  · Echo (2009): Normal LVEF 55%.  · Cardiac cath (2007): Patent LIMA to LAD.  Patent stents and vein graft of D1 to LAD.  Patent SVG to RCA.  Chronically occluded SVG to circumflex with adequate right to left collaterals.  Unchanged from prior study  · Cardiac cath (6/17/2016): Three-vessel CAD with occlusion of all coronary arteries and patent LIMA and patent SVG x2.  Medical therapy recommended       • Essential hypertension           • Hypothyroidism (acquired)               74 yo male with hx of IHD, HTN, PAF, CKD, Mixed HF. Pt with suspected GI bleed, EGD completed with mild gastritis. Eliquis has been held BONNIE.  Pt with elevated troponin (NSTEMI) in setting of anemia and elevated creatinine.  Ischemic eval may be considered in future.    Consult has been received.  Medical records have been reviewed.  Patient is a good candidate for the [facility].  Pt has been confused.  Education not completed today.  Education material added to  D/c Summary.  Patient to be scheduled follow up 1 week post discharge.    Echo Results:  Echo 10/21/19  · Estimated EF appears to be in the range of 41 - 45%  · Left ventricular systolic function is moderately decreased.  · Left ventricular diastolic dysfunction (grade II) consistent with pseudonormalization.  · Right ventricular cavity is mildly  dilated.  · Left atrial cavity size is dilated.  · Aortic valve mean pressure gradient is 7.0 mmHg.  · Mild mitral valve regurgitation is present      Heart Failure Quality Measures    ACE I, ARB, ARNI (if EF <40%)     If no contraindications:  BONNIE / CKD / Renal Stenosis and Hypotension    Evidence-based Beta Blocker (EF<40%)    (Bisoprolol, Carvedilol, Metoprolol Succinate)  If no contraindications:  Hypotension   Pt with a hx of bradycardia in the the past.    Pt has required midodrine for hypotension.  If BP, HR and kidney function remain stable, may consider outpatient    Aldosterone Antagonist (EF <40%)  If no contraindications:  BONNIE / CKD / Renal Stenosis    Atrial fibrillation / Atrial flutter:  Quality Measure    CHADS-VASc Score:  CHADS-VASc Risk Assessment            4       Total Score        1 CHF    1 Hypertension    1 Age 65-74    1      Vascular Disease    Criteria that do not apply:    Age >/= 75    DM    PRIOR STROKE/TIA/THROMBO        Sex: Female            Anticoagulation for CHADS-VASc >/=2    If no contraindications:  GI Bleed/anemia    Statin Therapy (for patients with a history of CAD, CVA/TIA, PVD, DM)  Yes  rosuvastatin

## 2019-10-22 NOTE — NURSING NOTE
Pt. Referred for Phase II Cardiac Rehab. Staff discussed benefits of exercise, program protocol, and educational material provided. Teach back verified.  Patient declined at this time stated he will call if he decides to participate in the program.

## 2019-10-22 NOTE — PROGRESS NOTES
Case Management Discharge Note    Final Note: Home family to transport.    Destination      No service has been selected for the patient.      Durable Medical Equipment      No service has been selected for the patient.      Dialysis/Infusion      No service has been selected for the patient.      Home Medical Care      No service has been selected for the patient.      Therapy      No service has been selected for the patient.      Community Resources      No service has been selected for the patient.             Final Discharge Disposition Code: 01 - home or self-care

## 2019-10-22 NOTE — PROGRESS NOTES
"   LOS: 3 days    Patient Care Team:  Deborah Henson PA-C as PCP - General (Physician Assistant)    Reason For Visit:  F/U BONNIE.  Subjective           Review of Systems:    Pulm: No soa   CV:  No CP      Objective       aspirin 81 mg Oral Daily   levothyroxine 75 mcg Oral Q AM   pantoprazole 40 mg Oral Q AM   pharmacy consult - MTM  Does not apply Daily   potassium chloride 40 mEq Oral Once   rosuvastatin 40 mg Oral Nightly   sodium chloride 10 mL Intravenous Q12H   tamsulosin 0.8 mg Oral Daily       sodium chloride 75 mL/hr Last Rate: 75 mL/hr (10/21/19 0626)         Vital Signs:  Blood pressure 134/65, pulse 59, temperature 98.7 °F (37.1 °C), temperature source Oral, resp. rate 16, height 175.3 cm (69\"), weight 93.9 kg (207 lb 0.2 oz), SpO2 92 %.    Flowsheet Rows      First Filed Value   Admission Height  175.3 cm (69\") Documented at 10/19/2019 0941   Admission Weight  88.5 kg (195 lb) Documented at 10/19/2019 0941          10/21 0701 - 10/22 0700  In: 1690 [P.O.:1080; I.V.:610]  Out: 2775 [Urine:2775]    Physical Exam:    General Appearance: NAD, alert and cooperative, Ox3  Eyes: PER, conjunctivae and sclerae normal, no icterus  Lungs: respirations regular and unlabored, no crepitus, clear to auscultation  Heart/CV: regular rhythm & normal rate, no murmur, no gallop, no rub and no edema  Abdomen: not distended, soft, non-tender, no masses,  bowel sounds present  Skin: No rash, Warm and dry. : FERRARA    Radiology:            Labs:  Results from last 7 days   Lab Units 10/22/19  0345 10/21/19  0313 10/20/19  1203 10/20/19  0528   WBC 10*3/mm3 5.08 4.79  --  3.78   HEMOGLOBIN g/dL 8.3* 8.5* 8.5* 8.3*   HEMATOCRIT % 27.7* 29.2* 29.4* 28.0*   PLATELETS 10*3/mm3 156 152  --  124*     Results from last 7 days   Lab Units 10/22/19  0345 10/21/19  0313 10/20/19  0528 10/19/19  2345  10/19/19  0951   SODIUM mmol/L 138 140 138  --   --  133*   POTASSIUM mmol/L 3.3* 3.4* 3.1* 3.5   < > 3.2*   CHLORIDE mmol/L 104 " 106 102  --   --  95*   CO2 mmol/L 22.0 23.0 24.0  --   --  27.0   BUN mg/dL 20 22 24*  --   --  27*   CREATININE mg/dL 2.59* 2.87* 2.93*  --   --  3.08*   CALCIUM mg/dL 7.9* 8.0* 8.0*  --   --  8.5*   PHOSPHORUS mg/dL 3.3  --  4.7*  --   --   --    MAGNESIUM mg/dL 2.2  --  2.6*  --   --  2.5*   ALBUMIN g/dL 2.60* 2.90* 3.00*  --   --  3.60    < > = values in this interval not displayed.     Results from last 7 days   Lab Units 10/22/19  0345   GLUCOSE mg/dL 105*       Results from last 7 days   Lab Units 10/21/19  0313   ALK PHOS U/L 268*   BILIRUBIN mg/dL 0.7   ALT (SGPT) U/L 17   AST (SGOT) U/L 41*           Results from last 7 days   Lab Units 10/19/19  1119   COLOR UA  Yellow   CLARITY UA  Clear   PH, URINE  6.0   SPECIFIC GRAVITY, URINE  1.008   GLUCOSE UA  Negative   KETONES UA  Negative   BILIRUBIN UA  Negative   PROTEIN UA  100 mg/dL (2+)*   BLOOD UA  Large (3+)*   LEUKOCYTES UA  Negative   NITRITE UA  Negative       Estimated Creatinine Clearance: 29.2 mL/min (A) (by C-G formula based on SCr of 2.59 mg/dL (H)).      Assessment       Suspected GI bleed    Coronary artery disease involving native coronary artery of native heart    Essential hypertension    Hypothyroidism (acquired)    Gastroesophageal reflux disease    Hyperlipidemia LDL goal <70    Paroxysmal atrial fibrillation (CMS/[facility])    Acute on chronic combined systolic and diastolic congestive heart failure (CMS/[facility])    NSTEMI (non-ST elevated myocardial infarction) (CMS/[facility])    Acute kidney failure (CMS/[facility])    Anemia due to [place] GI blood loss    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 ANEMIA. URINARY RETENTION WITH  FOLLOWING.            Recommendations: DISCHARGE OK WITH RENAL. F/U APPT WITH NAL 11/12/19 AT 11 AM.       Toni Stokes MD  10/22/19  8:53 AM

## 2019-10-22 NOTE — PLAN OF CARE
Problem: Skin Injury Risk (Adult)  Goal: Skin Health and Integrity  Outcome: Ongoing (interventions implemented as appropriate)   10/22/19 0625   Skin Injury Risk (Adult)   Skin Health and Integrity making progress toward outcome       Problem: Pain, Chronic (Adult)  Goal: Acceptable Pain/Comfort Level and Functional Ability  Outcome: Ongoing (interventions implemented as appropriate)   10/22/19 0625   Pain, Chronic (Adult)   Acceptable Pain/Comfort Level and Functional Ability making progress toward outcome       Problem: Gastrointestinal Bleeding (Adult)  Goal: Signs and Symptoms of Listed Potential Problems Will be Absent, Minimized or Managed (Gastrointestinal Bleeding)  Outcome: Ongoing (interventions implemented as appropriate)   10/22/19 0625   Goal/Outcome Evaluation   Problems Assessed (GI Bleeding) all   Problems Present (GI Bleeding) none       Problem: Renal Failure/Kidney Injury, Acute (Adult)  Goal: Signs and Symptoms of Listed Potential Problems Will be Absent, Minimized or Managed (Renal Failure/Kidney Injury, Acute)  Outcome: Ongoing (interventions implemented as appropriate)   10/22/19 0625   Goal/Outcome Evaluation   Problems Assessed (Acute Renal Failure/Kidney Injury) all   Problems Present (ARF/Kidney Injury) electrolyte imbalance       Problem: Patient Care Overview  Goal: Plan of Care Review  Outcome: Ongoing (interventions implemented as appropriate)   10/22/19 0625   Coping/Psychosocial   Plan of Care Reviewed With patient   Plan of Care Review   Progress improving   OTHER   Outcome Summary VSS, some ectopy noted on ECG monitor this am, K+ 3.3- will determine what replacement today per nephrology, amos remains in place per urology- 2175 ml output for shift. Room assigned on 6A this morning.        Problem: Fall Risk (Adult)  Goal: Absence of Fall  Outcome: Ongoing (interventions implemented as appropriate)   10/22/19 0625   Fall Risk (Adult)   Absence of Fall making progress toward outcome

## 2019-10-22 NOTE — PLAN OF CARE
Problem: Patient Care Overview  Goal: Plan of Care Review  Outcome: Ongoing (interventions implemented as appropriate)   10/22/19 4199   Coping/Psychosocial   Plan of Care Reviewed With patient   Plan of Care Review   Progress improving   OTHER   Outcome Summary VSS, potassium supplement given this am. Plans for d/c home today with spouse. Patient will follow-up with cardiology for a stress test, nephrology, and GI. Will be d/c home with indwelling catheter short-term until follow-up with urology for voiding trial. Educated on management of catheter.

## 2019-10-22 NOTE — PROGRESS NOTES
Clinical Nutrition     Multidisciplinary Rounds      Patient Name: Fidencio Zarate Jr.  Date of Encounter: 10/22/19 11:12 AM  MRN: [id number]  Admission date: 10/19/2019     DEANA JOYNER to continue to follow per protocol.       Current diet: Diet Regular; Cardiac  No active supplement orders      Intervention:  Follow treatment plan  Care plan reviewed    Follow up:   Per protocol      Raisa Bernal RD  11:12 AM  Time: 10min

## 2019-10-22 NOTE — DISCHARGE SUMMARY
Admit date: 10/19/2019  Date of Discharge:  10/22/2019    Discharge Diagnosis:   Acute on chronic blood loss anemia likely secondary to gastritis in the presence of blood thinner  Non-ST elevation MI  Gastritis likely the culprit for GI bleed  Acute urinary retention requiring urology consultation with placement of indwelling Finney catheter  Acute renal insufficiency    Hospital Course:  This very nice 73-year-old gentleman on chronic anticoagulation for atrial fibrillation with Eliquis who is been in his usual state of health when he was brought to the hospital with several days of abdominal cramping, confusion, and decreased energy.  He was found to have a 7.8 hemoglobin.  There was no sign of GI blood loss at that time.  He was also ruled in for a non-ST elevation myocardial infarction due to increased troponins and an echocardiogram was performed in consultation with cardiology was obtained.  Eliquis was held due to possible bleeding.  He was started on an aspirin however due to his acute coronary syndrome.  He was also found to have acute renal failure as well as acute urinary retention.  He was followed by Dr. Stokes with nephrology and Dr. Grover of urology for these issues.  A Finney catheter was placed and the plan is for continuation of this until follow-up with urology.    On October 21, 2019 he underwent esophagogastroduodenoscopy which showed only gastritis with no culprit bleeding.  It was recommended that Eliquis be held until after an outpatient colonoscopy.    The patient was able to take p.o. without difficulty and has returned to his normal mentation.  Renal function has continued to improve although has not completely normalized.  He was anemic have remained stable.  It was deemed that he was acceptable for discharge from the hospital today.    Post discharge, the plan will be for him to follow-up with gastroenterology within the next week or 2 for reevaluation and possible colonoscopy.  They  will need to make a determination regarding the reinitiation of his anticoagulation with Eliquis.  He will need to follow-up with urology within the next week or so for further plan regarding his indwelling Finney.  An appointment has also been made for follow-up with Dr. Stokes.  Follow-up will be made with cardiology as well for his non-ST elevation MI.  He will eventually need an ischemia evaluation when his creatinine normalizes.    Procedures Performed:  Procedure(s):  ESOPHAGOGASTRODUODENOSCOPY WITH BIOPSY  10/21 0847 UPPER GI ENDOSCOPY    Consults:   Dr. Brito, cardiology  Dr. Brunner, gastroenterology  Dr. Grover, urology  Dr. Stokes, nephrology    Pertinent Test Results:   Results from last 7 days   Lab Units 10/22/19  0345   WBC 10*3/mm3 5.08   HEMOGLOBIN g/dL 8.3*   HEMATOCRIT % 27.7*   PLATELETS 10*3/mm3 156     Results from last 7 days   Lab Units 10/22/19  0345 10/21/19  0313 10/20/19  0528   SODIUM mmol/L 138 140 138   POTASSIUM mmol/L 3.3* 3.4* 3.1*   CHLORIDE mmol/L 104 106 102   CO2 mmol/L 22.0 23.0 24.0   BUN mg/dL 20 22 24*   CREATININE mg/dL 2.59* 2.87* 2.93*   GLUCOSE mg/dL 105* 99 89   CALCIUM mg/dL 7.9* 8.0* 8.0*   PHOSPHORUS mg/dL 3.3  --  4.7*     Results from last 7 days   Lab Units 10/20/19  0528   HEMOGLOBIN A1C % 5.70*           Condition on Discharge:  Good  Vital Signs  Temp:  [98.5 °F (36.9 °C)-98.7 °F (37.1 °C)] 98.7 °F (37.1 °C)  Heart Rate:  [54-71] 71  Resp:  [16-18] 16  BP: (111-150)/(55-98) 133/71  HEENT: Normal HEENT exam.    Lungs:  Normal effort and normal respiratory rate.  Breath sounds clear to auscultation.    Heart: Bradycardia.  Regular rhythm.  S1 normal and S2 normal.  No murmur.   Abdomen: Abdomen is soft and non-distended.  Bowel sounds are normal.     Extremities: Normal range of motion.  There is no dependent edema.    Neurological: (The patient is awake.  He is oriented x3.).    Pupils:  Pupils are equal, round, and reactive to light.    Skin:  Warm.  No rash.      Discharge Disposition:  Home or Self Care    Discharge Medications:     Discharge Medications      New Medications      Instructions Start Date   aspirin 81 MG chewable tablet   81 mg, Oral, Daily   Start Date:  10/23/2019        Changes to Medications      Instructions Start Date   cholecalciferol 1.25 MG (71722 UT) capsule  Commonly known as:  VITAMIN D3  What changed:    · how much to take  · when to take this   50,000 Units, Oral, Weekly         Continue These Medications      Instructions Start Date   allopurinol 300 MG tablet  Commonly known as:  ZYLOPRIM   300 mg, Oral, Daily      CINNAMON PO   1,000 mg, Oral, Daily      citalopram 20 MG tablet  Commonly known as:  CeleXA   20 mg, Oral, Daily      gabapentin 600 MG tablet  Commonly known as:  NEURONTIN   600 mg, Oral, 3 Times Daily      levothyroxine 75 MCG tablet  Commonly known as:  SYNTHROID, LEVOTHROID   75 mcg, Oral, Daily      LUBRICANT EYE DROPS 0.4-0.3 % solution ophthalmic solution  Generic drug:  polyethyl glycol-propyl glycol   1 drop, Both Eyes, Nightly PRN      Morphine Sulfate ER 15 MG tablet extended-release 12 hour   1 tablet, Oral, Every 12 Hours      nitroglycerin 0.4 MG SL tablet  Commonly known as:  NITROSTAT   0.4 mg, Sublingual, As Needed      omeprazole 40 MG capsule  Commonly known as:  priLOSEC   40 mg, Oral, Daily      oxyCODONE-acetaminophen 7.5-325 MG per tablet  Commonly known as:  PERCOCET   1 tablet, Every 6 Hours PRN      rosuvastatin 40 MG tablet  Commonly known as:  CRESTOR   40 mg, Oral, Daily      tamsulosin 0.4 MG capsule 24 hr capsule  Commonly known as:  FLOMAX   1 capsule, Oral, Nightly         Stop These Medications    ELIQUIS 5 MG tablet tablet  Generic drug:  apixaban     sacubitril-valsartan 24-26 MG tablet  Commonly known as:  ENTRESTO            Discharge Diet: Cardiac    Activity at Discharge: As tolerated    Follow-up Appointments  Future Appointments   Date Time Provider Department Center   12/6/2019  1:15  PM Deborah Henson, [facility] None     Additional Instructions for the Follow-ups that You Need to Schedule     Discharge Follow-up with Specified Provider: Dr. Sommers; 2 Weeks   As directed      To:  Dr. Sommers    Follow Up:  2 Weeks    Follow Up Details:  Lexiscan stress test         Additional information on Labs and Follow-ups:      This office will contact you to schedule your follow-up appointment.                   Additional follow-ups as mentioned above in hospital course including nephrology, urology, and gastroenterology.    Test Results Pending at Discharge   Order Current Status    Tissue Pathology Exam In process    Blood Culture - Blood, Wrist, Right Preliminary result           Delfino Ambrose MD  10/22/19  12:08 PM    Time: Discharge 35 min

## 2019-10-23 NOTE — OUTREACH NOTE
Prep Survey      Responses   Facility patient discharged from?  [facility]   Is patient eligible?  Yes   Discharge diagnosis  Acute on chronic blood loss anemia    Does the patient have one of the following disease processes/diagnoses(primary or secondary)?  Other   Does the patient have Home health ordered?  No   Is there a DME ordered?  No   Prep survey completed?  Yes          Hannah Dial RN

## 2019-10-24 NOTE — OUTREACH NOTE
1st TCM interactive call attempt documented 10/23/19.  The patient was discharged from [facility] 10/22/19 and has a hospital follow up scheduled with PCP Deobrah Henson PA-C 10/24/19 which is within 2 business days of hospital discharge.

## 2019-10-25 NOTE — OUTREACH NOTE
Medical Week 1 Survey      Responses   Facility patient discharged from?  [facility]   Does the patient have one of the following disease processes/diagnoses(primary or secondary)?  Other   Is there a successful TCM telephone encounter documented?  No   Week 1 attempt successful?  Yes   Call start time  1706   Call end time  1712   Discharge diagnosis  Non-ST elevation MI   Meds reviewed with patient/caregiver?  Yes   Is the patient having any side effects they believe may be caused by any medication additions or changes?  No   Does the patient have all medications ordered at discharge?  Yes   Is the patient taking all medications as directed (includes completed medication regime)?  Yes   Does the patient have a primary care provider?   Yes   Does the patient have an appointment with their PCP within 7 days of discharge?  Yes   Has the patient kept scheduled appointments due by today?  Yes   Has home health visited the patient within 72 hours of discharge?  N/A   Psychosocial issues?  No   Did the patient receive a copy of their discharge instructions?  Yes   Nursing interventions  Reviewed instructions with patient   What is the patient's perception of their health status since discharge?  Improving   Is the patient/caregiver able to teach back signs and symptoms related to disease process for when to call PCP?  Yes   Is the patient/caregiver able to teach back signs and symptoms related to disease process for when to call 911?  Yes   Week 1 call completed?  Yes          Aldair Gordon RN

## 2019-10-25 NOTE — OUTREACH NOTE
Multiple attempts have been made to reach the patient by phone.  All attempts have been documented. Patient does NOT have a hospital follow up appointment scheduled.  TCM is applicable through 11/2/19.

## 2019-11-02 NOTE — OUTREACH NOTE
Medical Week 2 Survey      Responses   Facility patient discharged from?  [place]   Does the patient have one of the following disease processes/diagnoses(primary or secondary)?  Other   Week 2 attempt successful?  Yes   Call start time  1757   Discharge diagnosis  Non-ST elevation MI   Call end time  1800   Meds reviewed with patient/caregiver?  Yes   Is the patient having any side effects they believe may be caused by any medication additions or changes?  No   Does the patient have all medications ordered at discharge?  Yes   Is the patient taking all medications as directed (includes completed medication regime)?  Yes   Does the patient have a primary care provider?   Yes   Does the patient have an appointment with their PCP within 7 days of discharge?  Yes   Has the patient kept scheduled appointments due by today?  Yes   Psychosocial issues?  No   Did the patient receive a copy of their discharge instructions?  Yes   Nursing interventions  Reviewed instructions with patient, Educated on MyChart   What is the patient's perception of their health status since discharge?  Same   Is the patient/caregiver able to teach back signs and symptoms related to disease process for when to call PCP?  Yes   Is the patient/caregiver able to teach back signs and symptoms related to disease process for when to call 911?  Yes   Is the patient/caregiver able to teach back the hierarchy of who to call/visit for symptoms/problems? PCP, Specialist, Home health nurse, Urgent Care, ED, 911  Yes   Week 2 Call Completed?  Yes   Wrap up additional comments  Still very weak. Had blood work yesterday and will see the heart doctor Tuesday.           Liset Alves RN

## 2019-11-07 NOTE — TELEPHONE ENCOUNTER
Patient's wife called to follow up on his lab results from 11/1. Patient was informed that we do not have the results back yet and because of is insurance they have to be mailed out so it can take up to 2 weeks to get them back. She verbalized understanding and appreciation.

## 2019-11-07 NOTE — TELEPHONE ENCOUNTER
Spoke with patient regarding labs.  His kidney function is improving.  Hemoglobin is 9.1, higher than at hospital.  He has upcoming appointment with GI and cardiology.  He is not taking entresto, on lasix.  Not taking potassium supplement.  His potassium was 3.1 - will start supplementation.  Will give iron tablet to see if this helps with his fatigue and anemia, feeling very poorly per patient.  Not on eliquis, taking ASA 81 mg QD.  Told patient to call if he has questions or concerns.  Will see him back in office 12/06 unless he wants to be seen sooner.

## 2019-11-12 NOTE — OUTREACH NOTE
Medical Week 3 Survey      Responses   Facility patient discharged from?  [facility]   Does the patient have one of the following disease processes/diagnoses(primary or secondary)?  Other   Week 3 attempt successful?  Yes   Call start time  1030   Call end time  1033   Discharge diagnosis  Non-ST elevation MI   Meds reviewed with patient/caregiver?  Yes   Is the patient having any side effects they believe may be caused by any medication additions or changes?  No   Does the patient have all medications ordered at discharge?  Yes   Is the patient taking all medications as directed (includes completed medication regime)?  Yes   Does the patient have a primary care provider?   Yes   Does the patient have an appointment with their PCP within 7 days of discharge?  Yes   Has the patient kept scheduled appointments due by today?  Yes   Has home health visited the patient within 72 hours of discharge?  N/A   Psychosocial issues?  No   Did the patient receive a copy of their discharge instructions?  Yes   Nursing interventions  Reviewed instructions with patient   What is the patient's perception of their health status since discharge?  Improving   Is the patient/caregiver able to teach back signs and symptoms related to disease process for when to call PCP?  Yes   Is the patient/caregiver able to teach back signs and symptoms related to disease process for when to call 911?  Yes   Is the patient/caregiver able to teach back the hierarchy of who to call/visit for symptoms/problems? PCP, Specialist, Home health nurse, Urgent Care, ED, 911  Yes   Week 3 Call Completed?  Yes          Aldair Gordon RN

## 2019-11-19 NOTE — OUTREACH NOTE
Medical Week 4 Survey      Responses   Facility patient discharged from?  [place]   Does the patient have one of the following disease processes/diagnoses(primary or secondary)?  Other   Week 4 attempt successful?  Yes   Call start time  1610   Revoke  Decline to participate [hung up]   Call end time  1610   Discharge diagnosis  Non-ST elevation MI          Hollis Bess, RN

## 2019-11-21 PROBLEM — N18.30 CHRONIC KIDNEY DISEASE, STAGE III (MODERATE) (HCC): Status: ACTIVE | Noted: 2019-01-01

## 2019-11-21 PROBLEM — D50.9 IRON DEFICIENCY ANEMIA, UNSPECIFIED: Status: ACTIVE | Noted: 2019-01-01

## 2019-12-03 NOTE — TELEPHONE ENCOUNTER
----- Message from Deborah Henson PA-C sent at 12/3/2019 12:44 PM EST -----  Regarding: call patient - important  Please let patient know that his potassium value is well within the normal range now.  He should discontinue his potassium supplements.  If he takes lasix, recommend he take one potassium 20 mEq supplement with it.

## 2019-12-03 NOTE — PROGRESS NOTES
Transitional Care Follow Up Visit  Subjective     Fidencio Zarate Jr. is a 74 y.o. male who presents for a transitional care management visit.    Within 48 business hours after discharge our office contacted him via telephone to coordinate his care and needs.      I reviewed and discussed the details of that call along with the discharge summary, hospital problems, inpatient lab results, inpatient diagnostic studies, and consultation reports with Fidencio.     Current outpatient and discharge medications have been reconciled for the patient.    Date of TCM Phone Call 10/25/2019   [facility]   Date of Admission 10/19/2019   Date of Discharge 10/22/2019   Discharge Disposition Home or Self Care     Risk for Readmission (LACE) No Data Recorded    Patient was recently hospitalized at [facility] from 10/19 to 10/22 for non-ST elevation, acute renal insufficiency and anemia from possible GI bleed/gastritis.  His eliquis was discontinued and he has remained off of this medication.  He is taking ASA 81 mg daily.  All of his blood pressure medications have been discontinued.  He denies any symptoms of hypotension.  He takes lasix 40 mg only when he has swelling in his ankles, hasn't had to take this recently.  He is followed closely by cardiology, Dr. Sommers who has ordered blood transfusions for patient.  Patient has already had one transfusion and has a second one scheduled today.  He was unable to tolerate oral iron supplements.  He is taking potassium 20 mEq BID.  He has not been seen by GI for recommended colonoscopy, and he requests referral to Dr. Stapleton.  He is not currently followed by hematology.  He has his next appointment with Dr. Sommers in 1 month.  He denies any shortness of breath, dizziness or chest pain since being discharged.    Patient presents today with weight loss, fatigue, ongoing anemia and chronic pain.  He has no appetite, drinking ensure.  Ongoing fatigue with anemia.   Feeling generally unwell.  Has ongoing chronic pain that seems to be his main concern.  Has back and right knee pain.  Followed by pain management, Dr. Aguilar.  Wife is present at appointment.       Duration of Hospital Stay:  10/19 to 10/22     The following portions of the patient's history were reviewed and updated as appropriate: allergies, current medications, past family history, past medical history, past social history, past surgical history and problem list.    Review of Systems   Constitutional: Positive for fatigue. Negative for chills, diaphoresis and fever.   HENT: Negative for congestion, postnasal drip and rhinorrhea.    Respiratory: Negative for cough, shortness of breath and wheezing.    Cardiovascular: Negative for chest pain and leg swelling.   Gastrointestinal: Negative for abdominal pain, blood in stool, nausea and indigestion.   Genitourinary: Negative for flank pain, frequency and hematuria.   Musculoskeletal: Positive for arthralgias, back pain, gait problem (secondary to pain) and myalgias. Negative for joint swelling.   Neurological: Negative for dizziness and headache.   Psychiatric/Behavioral: Positive for stress. Negative for self-injury, sleep disturbance, suicidal ideas and depressed mood. The patient is not nervous/anxious.        Current Outpatient Medications on File Prior to Visit   Medication Sig Dispense Refill   • aspirin 81 MG chewable tablet Chew 1 tablet Daily. 30 tablet 1   • cholecalciferol (VITAMIN D3) 43689 units capsule Take 1 capsule by mouth 1 (One) Time Per Week. (Patient taking differently: Take 1,000 Units by mouth Daily.) 12 capsule 1   • CINNAMON PO Take 1,000 mg by mouth Daily.     • citalopram (CeleXA) 20 MG tablet TAKE 1 TABLET EVERY DAY 90 tablet 0   • furosemide (LASIX) 40 MG tablet Take 40 mg by mouth Daily.     • gabapentin (NEURONTIN) 600 MG tablet Take 600 mg by mouth 3 (Three) Times a Day.     • levothyroxine (SYNTHROID, LEVOTHROID) 75 MCG tablet Take 75  mcg by mouth Daily.     • LUBRICANT EYE DROPS 0.4-0.3 % solution ophthalmic solution Administer 1 drop to both eyes at night as needed.     • Morphine Sulfate ER 15 MG tablet extended-release 12 hour Take 1 tablet by mouth Every 12 (Twelve) Hours.     • nitroglycerin (NITROSTAT) 0.4 MG SL tablet Place 1 tablet under the tongue As Needed for Chest Pain. 10 tablet 11   • omeprazole (priLOSEC) 40 MG capsule Take 1 capsule by mouth Daily. 90 capsule 1   • oxyCODONE-acetaminophen (PERCOCET) 7.5-325 MG per tablet 1 tablet Every 6 (Six) Hours As Needed.     • potassium chloride (K-DUR,KLOR-CON) 20 MEQ CR tablet Take 1 tablet by mouth 2 (Two) Times a Day for 60 days. 60 tablet 1   • rosuvastatin (CRESTOR) 40 MG tablet Take 1 tablet by mouth Daily. 30 tablet 11   • tamsulosin (FLOMAX) 0.4 MG capsule 24 hr capsule Take 1 capsule by mouth Every Night. 90 capsule 3   • [DISCONTINUED] allopurinol (ZYLOPRIM) 300 MG tablet TAKE 1 TABLET EVERY DAY 90 tablet 0   • [DISCONTINUED] ferrous sulfate 325 (65 FE) MG tablet Take 1 tablet by mouth Daily With Breakfast. 30 tablet 1     No current facility-administered medications on file prior to visit.        Results for orders placed or performed in visit on 11/26/19   CBC (No diff)   Result Value Ref Range    WBC 5.72 3.40 - 10.80 10*3/mm3    RBC 3.48 (L) 4.14 - 5.80 10*6/mm3    Hemoglobin 8.8 (L) 13.0 - 17.7 g/dL    Hematocrit 30.4 (L) 37.5 - 51.0 %    MCV 87.4 79.0 - 97.0 fL    MCH 25.3 (L) 26.6 - 33.0 pg    MCHC 28.9 (L) 31.5 - 35.7 g/dL    RDW 19.9 (H) 12.3 - 15.4 %    RDW-SD 62.2 (H) 37.0 - 54.0 fl    MPV 10.5 6.0 - 12.0 fL    Platelets 159 140 - 450 10*3/mm3   Basic Metabolic Panel   Result Value Ref Range    Glucose 129 (H) 65 - 99 mg/dL    BUN 15 8 - 23 mg/dL    Creatinine 1.07 0.76 - 1.27 mg/dL    Sodium 134 (L) 136 - 145 mmol/L    Potassium 4.5 3.5 - 5.2 mmol/L    Chloride 96 (L) 98 - 107 mmol/L    CO2 26.0 22.0 - 29.0 mmol/L    Calcium 9.8 8.6 - 10.5 mg/dL    eGFR Non African  "Amer 68 >60 mL/min/1.73    BUN/Creatinine Ratio 14.0 7.0 - 25.0    Anion Gap 12.0 5.0 - 15.0 mmol/L   Iron   Result Value Ref Range    Iron 29 (L) 59 - 158 mcg/dL   Iron Profile   Result Value Ref Range    Iron 29 (L) 59 - 158 mcg/dL    Iron Saturation 7 (L) 20 - 50 %    Transferrin 283 200 - 360 mg/dL    TIBC 422 298 - 536 mcg/dL   Ferritin   Result Value Ref Range    Ferritin 38.02 30.00 - 400.00 ng/mL   Vitamin B12   Result Value Ref Range    Vitamin B-12 669 211 - 946 pg/mL   Folate   Result Value Ref Range    Folate >20.00 4.78 - 24.20 ng/mL       Visit Vitals  /68   Pulse 70   Ht 175.3 cm (69\")   Wt 83.3 kg (183 lb 9.6 oz)   SpO2 98%   BMI 27.11 kg/m²     Body mass index is 27.11 kg/m².    Objective   Physical Exam   Constitutional: Vital signs are normal. He appears well-developed and well-nourished. He is active and cooperative. He does not appear ill. No distress. He is not overweight.  HENT:   Head: Normocephalic and atraumatic.   Eyes: EOM are normal.   Neck: Normal range of motion.   Cardiovascular: Normal rate and regular rhythm.   Murmur heard.  Pulmonary/Chest: Effort normal and breath sounds normal.   Musculoskeletal: Normal range of motion. He exhibits no edema.   Neurological: He is alert.   Skin: Skin is warm. He is not diaphoretic. No erythema.   Psychiatric: He has a normal mood and affect. His speech is normal and behavior is normal. Judgment and thought content normal. He is not actively hallucinating. He is attentive.   Vitals reviewed.      Assessment/Plan   Fidencio was seen today for hospital follow up.    Diagnoses and all orders for this visit:    Anemia, unspecified type  Ongoing anemia.  Currently getting blood infusions ordered by nephrology, Dr. Samuel.  Has upcoming infusion today, had one about a week ago.  CBC w/diff shows stable hemoglobin and hematocrit, although still deficient.  CMP shows improvement in renal function.  Will order additional labs and consider referral to " hematology.  Have placed an order for colonoscopy as patient did not have this completed while hospitalized and was supposed to have it done as an outpatient.  He denies any rectal bleeding or dark tarry stools.  Still unclear why anemia is occurring.  Continues to hold eliquis.  On ASA 81 mg daily.  -     Reticulocytes; Future  -     Lactate Dehydrogenase; Future  -     Haptoglobin; Future  -     Peripheral Blood Smear; Future    Anemia due to [facility] blood loss  -     Ambulatory Referral to Gastroenterology    NSTEMI (non-ST elevated myocardial infarction) (CMS/[facility])  Paroxysmal atrial fibrillation (CMS/[facility])  Acute on chronic combined systolic and diastolic congestive heart failure (CMS/[facility])  Followed by Dr. Sommers, has appointment in 1 month.  Denies SOB, chest pain or dizziness today.  Not on any blood pressure medications.  Taking lasix very infrequently if he notices leg swelling.    Hypokalemia  Recent potassium was 4.5  Patient needs to discontinue potassium supplements.  Take 1 tablet with lasix when he needs diuretic.    Chronic kidney disease, stage III (moderate) (CMS/[facility])  Recent CMP showed improved kidney function.  Creatinine was 1.07, eGFR is 68  Followed by nephrology    Chronic gout of multiple sites, unspecified cause  Patient has been on allopurinol 300 mg daily for years  No recent gout attacks  With recent kidney decline, recommend trying allopurinol 100 mg daily and seeing if this dose is sufficient.  Call if he has any issues or concerns.  -     allopurinol (ZYLOPRIM) 100 MG tablet; Take 1 tablet by mouth Daily.    Chronic pain syndrome  Followed by pain management, Dr. Aguilar.  Main complaints today are ongoing chronic back pain and left knee pain.

## 2019-12-11 NOTE — PROGRESS NOTES
[facility] Nutrition    Phone call received from patient's wife regarding purchase of commercial nutritional supplements; patient is familiar to me from cancer treatment 10 years prior.  She states that she is very worried about his nutritional status and wanted to implement these into his daily oral intake to boost intake.  Discussed various options that were available commercially and also suggested trying [employer].  Provided tips for flavor and nutrient enhancement of supplements; coupons and recipes mailed to patient.  All questions addressed.

## 2019-12-16 NOTE — TELEPHONE ENCOUNTER
----- Message from Deborah Henson PA-C sent at 12/13/2019 12:44 PM EST -----  Regarding: call patient - need to return for lab  Please call patient.  Would like him to return to office today or next week for repeat CBC w/diff to evaluate his blood levels and make sure they are stable and not declining.

## 2019-12-23 NOTE — TELEPHONE ENCOUNTER
----- Message from Deborah Henson PA-C sent at 12/20/2019  4:12 PM EST -----  Please call patient about BP.  He needs follow-up per gastroenterology, I'm assuming his recent BP was high during exam.    Thanks! CAM    ----- Message -----  From: Brittany Knight  Sent: 12/20/2019   3:50 PM EST  To: Deborah Henson PA-C

## 2019-12-27 NOTE — TELEPHONE ENCOUNTER
Called patient, third time. No answer. lvm with office number to call back. Letter has been mailed out.

## 2020-01-01 ENCOUNTER — APPOINTMENT (OUTPATIENT)
Dept: ULTRASOUND IMAGING | Facility: HOSPITAL | Age: 75
End: 2020-01-01

## 2020-01-01 ENCOUNTER — HOSPITAL ENCOUNTER (INPATIENT)
Facility: HOSPITAL | Age: 75
LOS: 1 days | End: 2020-03-21
Attending: EMERGENCY MEDICINE | Admitting: EMERGENCY MEDICINE

## 2020-01-01 ENCOUNTER — TELEPHONE (OUTPATIENT)
Dept: FAMILY MEDICINE CLINIC | Facility: CLINIC | Age: 75
End: 2020-01-01

## 2020-01-01 ENCOUNTER — APPOINTMENT (OUTPATIENT)
Dept: GENERAL RADIOLOGY | Facility: HOSPITAL | Age: 75
End: 2020-01-01

## 2020-01-01 ENCOUNTER — TELEPHONE (OUTPATIENT)
Dept: ONCOLOGY | Facility: CLINIC | Age: 75
End: 2020-01-01

## 2020-01-01 ENCOUNTER — TELEPHONE (OUTPATIENT)
Dept: CARDIOLOGY | Facility: HOSPITAL | Age: 75
End: 2020-01-01

## 2020-01-01 ENCOUNTER — READMISSION MANAGEMENT (OUTPATIENT)
Dept: CALL CENTER | Facility: HOSPITAL | Age: 75
End: 2020-01-01

## 2020-01-01 ENCOUNTER — LAB REQUISITION (OUTPATIENT)
Dept: LAB | Facility: HOSPITAL | Age: 75
End: 2020-01-01

## 2020-01-01 ENCOUNTER — LAB (OUTPATIENT)
Dept: LAB | Facility: HOSPITAL | Age: 75
End: 2020-01-01

## 2020-01-01 ENCOUNTER — TRANSITIONAL CARE MANAGEMENT TELEPHONE ENCOUNTER (OUTPATIENT)
Dept: CALL CENTER | Facility: HOSPITAL | Age: 75
End: 2020-01-01

## 2020-01-01 ENCOUNTER — OFFICE VISIT (OUTPATIENT)
Dept: FAMILY MEDICINE CLINIC | Facility: CLINIC | Age: 75
End: 2020-01-01

## 2020-01-01 ENCOUNTER — CONSULT (OUTPATIENT)
Dept: ONCOLOGY | Facility: CLINIC | Age: 75
End: 2020-01-01

## 2020-01-01 ENCOUNTER — APPOINTMENT (OUTPATIENT)
Dept: CARDIOLOGY | Facility: HOSPITAL | Age: 75
End: 2020-01-01

## 2020-01-01 ENCOUNTER — OFFICE VISIT (OUTPATIENT)
Dept: CARDIOLOGY | Facility: HOSPITAL | Age: 75
End: 2020-01-01

## 2020-01-01 ENCOUNTER — HOSPITAL ENCOUNTER (INPATIENT)
Facility: HOSPITAL | Age: 75
LOS: 3 days | Discharge: HOME | End: 2020-01-11
Attending: EMERGENCY MEDICINE | Admitting: INTERNAL MEDICINE

## 2020-01-01 ENCOUNTER — NURSE TRIAGE (OUTPATIENT)
Dept: CALL CENTER | Facility: HOSPITAL | Age: 75
End: 2020-01-01

## 2020-01-01 ENCOUNTER — APPOINTMENT (OUTPATIENT)
Dept: CT IMAGING | Facility: HOSPITAL | Age: 75
End: 2020-01-01

## 2020-01-01 ENCOUNTER — RESULTS ENCOUNTER (OUTPATIENT)
Dept: FAMILY MEDICINE CLINIC | Facility: CLINIC | Age: 75
End: 2020-01-01

## 2020-01-01 ENCOUNTER — HOSPITAL ENCOUNTER (INPATIENT)
Facility: HOSPITAL | Age: 75
LOS: 4 days | Discharge: HOME HEALTH | End: 2020-01-07
Attending: EMERGENCY MEDICINE | Admitting: INTERNAL MEDICINE

## 2020-01-01 ENCOUNTER — HOSPITAL ENCOUNTER (OUTPATIENT)
Facility: HOSPITAL | Age: 75
Setting detail: OBSERVATION
Discharge: HOME HEALTH | End: 2020-02-26
Attending: EMERGENCY MEDICINE | Admitting: HOSPITALIST

## 2020-01-01 VITALS
TEMPERATURE: 97.8 F | RESPIRATION RATE: 16 BRPM | HEIGHT: 69 IN | HEART RATE: 80 BPM | BODY MASS INDEX: 30.07 KG/M2 | SYSTOLIC BLOOD PRESSURE: 101 MMHG | OXYGEN SATURATION: 94 % | DIASTOLIC BLOOD PRESSURE: 66 MMHG | WEIGHT: 203 LBS

## 2020-01-01 VITALS
SYSTOLIC BLOOD PRESSURE: 107 MMHG | HEIGHT: 69 IN | OXYGEN SATURATION: 100 % | TEMPERATURE: 97.7 F | HEART RATE: 86 BPM | WEIGHT: 180 LBS | DIASTOLIC BLOOD PRESSURE: 69 MMHG | BODY MASS INDEX: 26.66 KG/M2 | RESPIRATION RATE: 18 BRPM

## 2020-01-01 VITALS
SYSTOLIC BLOOD PRESSURE: 100 MMHG | TEMPERATURE: 97.7 F | HEIGHT: 69 IN | DIASTOLIC BLOOD PRESSURE: 71 MMHG | OXYGEN SATURATION: 98 % | RESPIRATION RATE: 16 BRPM | WEIGHT: 187.3 LBS | HEART RATE: 82 BPM | BODY MASS INDEX: 27.74 KG/M2

## 2020-01-01 VITALS
OXYGEN SATURATION: 97 % | WEIGHT: 205 LBS | BODY MASS INDEX: 30.36 KG/M2 | HEART RATE: 74 BPM | DIASTOLIC BLOOD PRESSURE: 70 MMHG | HEIGHT: 69 IN | SYSTOLIC BLOOD PRESSURE: 128 MMHG

## 2020-01-01 VITALS
HEART RATE: 78 BPM | DIASTOLIC BLOOD PRESSURE: 60 MMHG | SYSTOLIC BLOOD PRESSURE: 93 MMHG | TEMPERATURE: 98.5 F | HEIGHT: 69 IN | OXYGEN SATURATION: 98 % | BODY MASS INDEX: 27.99 KG/M2 | RESPIRATION RATE: 18 BRPM | WEIGHT: 189 LBS

## 2020-01-01 VITALS
HEART RATE: 81 BPM | HEIGHT: 69 IN | SYSTOLIC BLOOD PRESSURE: 105 MMHG | BODY MASS INDEX: 29.83 KG/M2 | DIASTOLIC BLOOD PRESSURE: 68 MMHG | TEMPERATURE: 97.4 F | RESPIRATION RATE: 18 BRPM | OXYGEN SATURATION: 95 % | WEIGHT: 201.4 LBS

## 2020-01-01 VITALS
DIASTOLIC BLOOD PRESSURE: 70 MMHG | SYSTOLIC BLOOD PRESSURE: 102 MMHG | OXYGEN SATURATION: 93 % | HEART RATE: 83 BPM | HEIGHT: 69 IN | WEIGHT: 189.4 LBS | BODY MASS INDEX: 28.05 KG/M2

## 2020-01-01 VITALS
DIASTOLIC BLOOD PRESSURE: 62 MMHG | OXYGEN SATURATION: 93 % | WEIGHT: 205.44 LBS | BODY MASS INDEX: 30.34 KG/M2 | SYSTOLIC BLOOD PRESSURE: 101 MMHG | HEART RATE: 80 BPM

## 2020-01-01 DIAGNOSIS — G89.4 CHRONIC PAIN SYNDROME: ICD-10-CM

## 2020-01-01 DIAGNOSIS — Z23 NEED FOR IMMUNIZATION AGAINST INFLUENZA: ICD-10-CM

## 2020-01-01 DIAGNOSIS — K21.9 GASTROESOPHAGEAL REFLUX DISEASE WITHOUT ESOPHAGITIS: ICD-10-CM

## 2020-01-01 DIAGNOSIS — Z12.5 PROSTATE CANCER SCREENING: ICD-10-CM

## 2020-01-01 DIAGNOSIS — I10 ESSENTIAL HYPERTENSION: ICD-10-CM

## 2020-01-01 DIAGNOSIS — F33.1 MODERATE EPISODE OF RECURRENT MAJOR DEPRESSIVE DISORDER (HCC): ICD-10-CM

## 2020-01-01 DIAGNOSIS — Z13.6 ENCOUNTER FOR SCREENING FOR CARDIOVASCULAR DISORDERS: ICD-10-CM

## 2020-01-01 DIAGNOSIS — M25.562 CHRONIC PAIN OF LEFT KNEE: ICD-10-CM

## 2020-01-01 DIAGNOSIS — M1A.09X0 CHRONIC GOUT OF MULTIPLE SITES, UNSPECIFIED CAUSE: ICD-10-CM

## 2020-01-01 DIAGNOSIS — I48.91 ATRIAL FIBRILLATION WITH RVR (HCC): Primary | ICD-10-CM

## 2020-01-01 DIAGNOSIS — D64.9 ANEMIA, UNSPECIFIED TYPE: ICD-10-CM

## 2020-01-01 DIAGNOSIS — I50.43 ACUTE ON CHRONIC COMBINED SYSTOLIC AND DIASTOLIC CONGESTIVE HEART FAILURE (HCC): ICD-10-CM

## 2020-01-01 DIAGNOSIS — E78.5 HYPERLIPIDEMIA LDL GOAL <70: ICD-10-CM

## 2020-01-01 DIAGNOSIS — E55.9 VITAMIN D DEFICIENCY: ICD-10-CM

## 2020-01-01 DIAGNOSIS — R09.02 HYPOXEMIA: ICD-10-CM

## 2020-01-01 DIAGNOSIS — E78.5 HYPERLIPIDEMIA, UNSPECIFIED HYPERLIPIDEMIA TYPE: ICD-10-CM

## 2020-01-01 DIAGNOSIS — N18.30 STAGE 3 CHRONIC KIDNEY DISEASE (HCC): ICD-10-CM

## 2020-01-01 DIAGNOSIS — E87.1 HYPONATREMIA: ICD-10-CM

## 2020-01-01 DIAGNOSIS — Z11.59 ENCOUNTER FOR HEPATITIS C SCREENING TEST FOR LOW RISK PATIENT: ICD-10-CM

## 2020-01-01 DIAGNOSIS — R13.13 PHARYNGEAL DYSPHAGIA: ICD-10-CM

## 2020-01-01 DIAGNOSIS — Z00.00 MEDICARE ANNUAL WELLNESS VISIT, SUBSEQUENT: ICD-10-CM

## 2020-01-01 DIAGNOSIS — C32.9 MALIGNANT NEOPLASM OF LARYNX (HCC): ICD-10-CM

## 2020-01-01 DIAGNOSIS — J44.9 CHRONIC OBSTRUCTIVE PULMONARY DISEASE, UNSPECIFIED COPD TYPE (HCC): ICD-10-CM

## 2020-01-01 DIAGNOSIS — N28.9 ACUTE RENAL INSUFFICIENCY: Primary | ICD-10-CM

## 2020-01-01 DIAGNOSIS — Z09 HOSPITAL DISCHARGE FOLLOW-UP: Primary | ICD-10-CM

## 2020-01-01 DIAGNOSIS — G89.29 CHRONIC MIDLINE LOW BACK PAIN WITHOUT SCIATICA: ICD-10-CM

## 2020-01-01 DIAGNOSIS — R77.8 ELEVATED TROPONIN: ICD-10-CM

## 2020-01-01 DIAGNOSIS — N28.9 RENAL INSUFFICIENCY: ICD-10-CM

## 2020-01-01 DIAGNOSIS — R53.1 GENERALIZED WEAKNESS: Primary | ICD-10-CM

## 2020-01-01 DIAGNOSIS — J18.9 HEALTHCARE-ASSOCIATED PNEUMONIA: Primary | ICD-10-CM

## 2020-01-01 DIAGNOSIS — Z74.09 IMPAIRED MOBILITY AND ADLS: ICD-10-CM

## 2020-01-01 DIAGNOSIS — G89.29 CHRONIC PAIN OF LEFT KNEE: ICD-10-CM

## 2020-01-01 DIAGNOSIS — M54.50 CHRONIC MIDLINE LOW BACK PAIN WITHOUT SCIATICA: ICD-10-CM

## 2020-01-01 DIAGNOSIS — I48.0 PAF (PAROXYSMAL ATRIAL FIBRILLATION) (HCC): ICD-10-CM

## 2020-01-01 DIAGNOSIS — D50.9 IRON DEFICIENCY ANEMIA, UNSPECIFIED IRON DEFICIENCY ANEMIA TYPE: ICD-10-CM

## 2020-01-01 DIAGNOSIS — I25.810 ATHEROSCLEROSIS OF CORONARY ARTERY BYPASS GRAFT OF NATIVE HEART WITHOUT ANGINA PECTORIS: ICD-10-CM

## 2020-01-01 DIAGNOSIS — N18.30 CHRONIC KIDNEY DISEASE, STAGE III (MODERATE) (HCC): ICD-10-CM

## 2020-01-01 DIAGNOSIS — R82.90 ABNORMAL URINE: ICD-10-CM

## 2020-01-01 DIAGNOSIS — I48.0 PAROXYSMAL ATRIAL FIBRILLATION (HCC): ICD-10-CM

## 2020-01-01 DIAGNOSIS — N13.8 BPH WITH OBSTRUCTION/LOWER URINARY TRACT SYMPTOMS: ICD-10-CM

## 2020-01-01 DIAGNOSIS — J18.9 HOSPITAL-ACQUIRED PNEUMONIA: ICD-10-CM

## 2020-01-01 DIAGNOSIS — A41.9 SEPSIS, DUE TO UNSPECIFIED ORGANISM, UNSPECIFIED WHETHER ACUTE ORGAN DYSFUNCTION PRESENT (HCC): ICD-10-CM

## 2020-01-01 DIAGNOSIS — Z00.00 ROUTINE GENERAL MEDICAL EXAMINATION AT A HEALTH CARE FACILITY: ICD-10-CM

## 2020-01-01 DIAGNOSIS — E03.9 HYPOTHYROIDISM (ACQUIRED): ICD-10-CM

## 2020-01-01 DIAGNOSIS — E03.9 HYPOTHYROIDISM, UNSPECIFIED TYPE: ICD-10-CM

## 2020-01-01 DIAGNOSIS — Z00.00 PHYSICAL EXAM, ANNUAL: ICD-10-CM

## 2020-01-01 DIAGNOSIS — I50.43 ACUTE ON CHRONIC COMBINED SYSTOLIC AND DIASTOLIC CONGESTIVE HEART FAILURE (HCC): Primary | ICD-10-CM

## 2020-01-01 DIAGNOSIS — R60.9 PERIPHERAL EDEMA: ICD-10-CM

## 2020-01-01 DIAGNOSIS — F32.A DEPRESSION, UNSPECIFIED DEPRESSION TYPE: ICD-10-CM

## 2020-01-01 DIAGNOSIS — Z78.9 IMPAIRED MOBILITY AND ADLS: ICD-10-CM

## 2020-01-01 DIAGNOSIS — N40.1 BPH WITH OBSTRUCTION/LOWER URINARY TRACT SYMPTOMS: ICD-10-CM

## 2020-01-01 DIAGNOSIS — I25.10 CORONARY ARTERY DISEASE INVOLVING NATIVE CORONARY ARTERY OF NATIVE HEART WITHOUT ANGINA PECTORIS: ICD-10-CM

## 2020-01-01 DIAGNOSIS — N30.00 ACUTE CYSTITIS WITHOUT HEMATURIA: Primary | ICD-10-CM
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
## 2020-01-01 DIAGNOSIS — Z86.79 HISTORY OF HYPERTENSION: ICD-10-CM

## 2020-01-01 DIAGNOSIS — Z95.0 PACEMAKER: ICD-10-CM

## 2020-01-01 DIAGNOSIS — I25.9 ISCHEMIC HEART DISEASE: ICD-10-CM

## 2020-01-01 DIAGNOSIS — G44.89 HEADACHE SYNDROME: ICD-10-CM

## 2020-01-01 DIAGNOSIS — R13.10 DYSPHAGIA, UNSPECIFIED TYPE: Primary | ICD-10-CM

## 2020-01-01 DIAGNOSIS — D50.0 IRON DEFICIENCY ANEMIA DUE TO CHRONIC BLOOD LOSS: Primary | ICD-10-CM

## 2020-01-01 DIAGNOSIS — K92.2 GASTROINTESTINAL HEMORRHAGE, UNSPECIFIED GASTROINTESTINAL HEMORRHAGE TYPE: ICD-10-CM

## 2020-01-01 DIAGNOSIS — N18.9 CHRONIC KIDNEY DISEASE, UNSPECIFIED CKD STAGE: ICD-10-CM

## 2020-01-01 DIAGNOSIS — E87.6 ACUTE HYPOKALEMIA: ICD-10-CM

## 2020-01-01 DIAGNOSIS — D72.829 LEUKOCYTOSIS, UNSPECIFIED TYPE: ICD-10-CM

## 2020-01-01 DIAGNOSIS — Z86.79 HISTORY OF CORONARY ARTERY DISEASE: ICD-10-CM

## 2020-01-01 DIAGNOSIS — D50.0 ANEMIA DUE TO GI BLOOD LOSS: ICD-10-CM
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
## 2020-01-01 DIAGNOSIS — C32.9 MALIGNANT NEOPLASM OF LARYNX (HCC): Primary | ICD-10-CM

## 2020-01-01 DIAGNOSIS — E78.2 MIXED HYPERLIPIDEMIA: ICD-10-CM

## 2020-01-01 LAB
ALBUMIN SERPL-MCNC: 3.4 G/DL (ref 3.5–5.2)
ALBUMIN SERPL-MCNC: 3.6 G/DL (ref 3.5–5.2)
ALBUMIN SERPL-MCNC: 3.7 G/DL (ref 3.5–5.2)
ALBUMIN SERPL-MCNC: 3.7 G/DL (ref 3.5–5.2)
ALBUMIN SERPL-MCNC: 3.8 G/DL (ref 3.5–5.2)
ALBUMIN/GLOB SERPL: 1 G/DL
ALBUMIN/GLOB SERPL: 1 G/DL
ALBUMIN/GLOB SERPL: 1.2 G/DL
ALBUMIN/GLOB SERPL: 1.2 G/DL
ALBUMIN/GLOB SERPL: 1.3 G/DL
ALP SERPL-CCNC: 180 U/L (ref 39–117)
ALP SERPL-CCNC: 194 U/L (ref 39–117)
ALP SERPL-CCNC: 204 U/L (ref 39–117)
ALP SERPL-CCNC: 217 U/L (ref 39–117)
ALP SERPL-CCNC: 275 U/L (ref 39–117)
ALT SERPL W P-5'-P-CCNC: 21 U/L (ref 1–41)
ALT SERPL W P-5'-P-CCNC: 22 U/L (ref 1–41)
ALT SERPL W P-5'-P-CCNC: 26 U/L (ref 1–41)
ALT SERPL W P-5'-P-CCNC: 38 U/L (ref 1–41)
ALT SERPL W P-5'-P-CCNC: 43 U/L (ref 1–41)
AMPHET+METHAMPHET UR QL: NEGATIVE
AMPHETAMINES UR QL: NEGATIVE
ANION GAP SERPL CALCULATED.3IONS-SCNC: 10 MMOL/L (ref 5–15)
ANION GAP SERPL CALCULATED.3IONS-SCNC: 11 MMOL/L (ref 5–15)
ANION GAP SERPL CALCULATED.3IONS-SCNC: 11 MMOL/L (ref 5–15)
ANION GAP SERPL CALCULATED.3IONS-SCNC: 12 MMOL/L (ref 5–15)
ANION GAP SERPL CALCULATED.3IONS-SCNC: 14 MMOL/L (ref 5–15)
ANION GAP SERPL CALCULATED.3IONS-SCNC: 14 MMOL/L (ref 5–15)
ANION GAP SERPL CALCULATED.3IONS-SCNC: 14.8 MMOL/L (ref 5–15)
ANION GAP SERPL CALCULATED.3IONS-SCNC: 15 MMOL/L (ref 5–15)
ANION GAP SERPL CALCULATED.3IONS-SCNC: 16 MMOL/L (ref 5–15)
ANION GAP SERPL CALCULATED.3IONS-SCNC: 17 MMOL/L (ref 5–15)
APAP SERPL-MCNC: <5 MCG/ML (ref 10–30)
ARTERIAL PATENCY WRIST A: ABNORMAL
AST SERPL-CCNC: 142 U/L (ref 1–40)
AST SERPL-CCNC: 58 U/L (ref 1–40)
AST SERPL-CCNC: 66 U/L (ref 1–40)
AST SERPL-CCNC: 73 U/L (ref 1–40)
AST SERPL-CCNC: 96 U/L (ref 1–40)
ATMOSPHERIC PRESS: ABNORMAL MM[HG]
B PARAPERT DNA SPEC QL NAA+PROBE: NOT DETECTED
B PERT DNA SPEC QL NAA+PROBE: NOT DETECTED
BACTERIA SPEC AEROBE CULT: NORMAL
BACTERIA SPEC AEROBE CULT: NORMAL
BACTERIA UR CULT: ABNORMAL
BACTERIA UR CULT: ABNORMAL
BACTERIA UR QL AUTO: ABNORMAL /HPF
BACTERIA UR QL AUTO: NORMAL /HPF
BACTERIA UR QL AUTO: NORMAL /HPF
BARBITURATES UR QL SCN: NEGATIVE
BASE EXCESS BLDA CALC-SCNC: -3.6 MMOL/L (ref 0–2)
BASOPHILS # BLD AUTO: 0.02 10*3/MM3 (ref 0–0.2)
BASOPHILS # BLD AUTO: 0.03 10*3/MM3 (ref 0–0.2)
BASOPHILS # BLD AUTO: 0.03 10*3/MM3 (ref 0–0.2)
BASOPHILS # BLD AUTO: 0.04 10*3/MM3 (ref 0–0.2)
BASOPHILS # BLD AUTO: 0.05 10*3/MM3 (ref 0–0.2)
BASOPHILS NFR BLD AUTO: 0.2 % (ref 0–1.5)
BASOPHILS NFR BLD AUTO: 0.2 % (ref 0–1.5)
BASOPHILS NFR BLD AUTO: 0.4 % (ref 0–1.5)
BASOPHILS NFR BLD AUTO: 0.5 % (ref 0–1.5)
BASOPHILS NFR BLD AUTO: 0.6 % (ref 0–1.5)
BASOPHILS NFR BLD AUTO: 0.7 % (ref 0–1.5)
BASOPHILS NFR BLD AUTO: 1.2 % (ref 0–1.5)
BDY SITE: ABNORMAL
BENZODIAZ UR QL SCN: POSITIVE
BH CV ECHO MEAS - AO ROOT AREA (BSA CORRECTED): 2.1
BH CV ECHO MEAS - AO ROOT AREA: 14.5 CM^2
BH CV ECHO MEAS - AO ROOT DIAM: 4.3 CM
BH CV ECHO MEAS - BSA(HAYCOCK): 2 M^2
BH CV ECHO MEAS - BSA: 2 M^2
BH CV ECHO MEAS - BZI_BMI: 27.6 KILOGRAMS/M^2
BH CV ECHO MEAS - BZI_METRIC_HEIGHT: 175.3 CM
BH CV ECHO MEAS - BZI_METRIC_WEIGHT: 84.8 KG
BH CV ECHO MEAS - EDV(CUBED): 206.4 ML
BH CV ECHO MEAS - EDV(MOD-SP2): 273 ML
BH CV ECHO MEAS - EDV(MOD-SP4): 223 ML
BH CV ECHO MEAS - EDV(TEICH): 173.9 ML
BH CV ECHO MEAS - EF(CUBED): 37.2 %
BH CV ECHO MEAS - EF(MOD-BP): 29 %
BH CV ECHO MEAS - EF(MOD-SP2): 27.8 %
BH CV ECHO MEAS - EF(MOD-SP4): 32.3 %
BH CV ECHO MEAS - EF(TEICH): 30.1 %
BH CV ECHO MEAS - ESV(CUBED): 129.6 ML
BH CV ECHO MEAS - ESV(MOD-SP2): 197 ML
BH CV ECHO MEAS - ESV(MOD-SP4): 151 ML
BH CV ECHO MEAS - ESV(TEICH): 121.6 ML
BH CV ECHO MEAS - FS: 14.4 %
BH CV ECHO MEAS - IVS/LVPW: 1.4
BH CV ECHO MEAS - IVSD: 0.96 CM
BH CV ECHO MEAS - LA DIMENSION: 5.6 CM
BH CV ECHO MEAS - LA/AO: 1.3
BH CV ECHO MEAS - LAD MAJOR: 6.1 CM
BH CV ECHO MEAS - LAT PEAK E' VEL: 5.1 CM/SEC
BH CV ECHO MEAS - LATERAL E/E' RATIO: 21.6
BH CV ECHO MEAS - LV DIASTOLIC VOL/BSA (35-75): 111.1 ML/M^2
BH CV ECHO MEAS - LV MASS(C)D: 187.5 GRAMS
BH CV ECHO MEAS - LV MASS(C)DI: 93.4 GRAMS/M^2
BH CV ECHO MEAS - LV MAX PG: 2.6 MMHG
BH CV ECHO MEAS - LV MEAN PG: 1 MMHG
BH CV ECHO MEAS - LV SYSTOLIC VOL/BSA (12-30): 75.2 ML/M^2
BH CV ECHO MEAS - LV V1 MAX: 81.2 CM/SEC
BH CV ECHO MEAS - LV V1 MEAN: 53.5 CM/SEC
BH CV ECHO MEAS - LV V1 VTI: 17.9 CM
BH CV ECHO MEAS - LVIDD: 5.9 CM
BH CV ECHO MEAS - LVIDS: 5.1 CM
BH CV ECHO MEAS - LVLD AP2: 11.1 CM
BH CV ECHO MEAS - LVLD AP4: 10.9 CM
BH CV ECHO MEAS - LVLS AP2: 9.6 CM
BH CV ECHO MEAS - LVLS AP4: 10 CM
BH CV ECHO MEAS - LVOT AREA (M): 3.5 CM^2
BH CV ECHO MEAS - LVOT AREA: 3.5 CM^2
BH CV ECHO MEAS - LVOT DIAM: 2.1 CM
BH CV ECHO MEAS - LVPWD: 0.68 CM
BH CV ECHO MEAS - MED PEAK E' VEL: 4.5 CM/SEC
BH CV ECHO MEAS - MEDIAL E/E' RATIO: 24.7
BH CV ECHO MEAS - MV A MAX VEL: 32.6 CM/SEC
BH CV ECHO MEAS - MV DEC TIME: 0.13 SEC
BH CV ECHO MEAS - MV E MAX VEL: 110 CM/SEC
BH CV ECHO MEAS - MV E/A: 3.4
BH CV ECHO MEAS - PA ACC SLOPE: 437 CM/SEC^2
BH CV ECHO MEAS - PA ACC TIME: 0.15 SEC
BH CV ECHO MEAS - PA PR(ACCEL): 11.1 MMHG
BH CV ECHO MEAS - RAP SYSTOLE: 8 MMHG
BH CV ECHO MEAS - RVSP: 27 MMHG
BH CV ECHO MEAS - SI(CUBED): 38.3 ML/M^2
BH CV ECHO MEAS - SI(LVOT): 30.9 ML/M^2
BH CV ECHO MEAS - SI(MOD-SP2): 37.9 ML/M^2
BH CV ECHO MEAS - SI(MOD-SP4): 35.9 ML/M^2
BH CV ECHO MEAS - SI(TEICH): 26.1 ML/M^2
BH CV ECHO MEAS - SV(CUBED): 76.9 ML
BH CV ECHO MEAS - SV(LVOT): 62 ML
BH CV ECHO MEAS - SV(MOD-SP2): 76 ML
BH CV ECHO MEAS - SV(MOD-SP4): 72 ML
BH CV ECHO MEAS - SV(TEICH): 52.3 ML
BH CV ECHO MEAS - TAPSE (>1.6): 1.3 CM2
BH CV ECHO MEAS - TR MAX PG: 19 MMHG
BH CV ECHO MEAS - TR MAX VEL: 220 CM/SEC
BH CV ECHO MEASUREMENTS AVERAGE E/E' RATIO: 22.92
BH CV VAS BP LEFT ARM: NORMAL MMHG
BH CV XLRA - RV BASE: 4.9 CM
BH CV XLRA - RV LENGTH: 7.9 CM
BH CV XLRA - RV MID: 3.6 CM
BH CV XLRA - TDI S': 8.35 CM/SEC
BILIRUB BLD-MCNC: NEGATIVE MG/DL
BILIRUB SERPL-MCNC: 1 MG/DL (ref 0.2–1.2)
BILIRUB SERPL-MCNC: 1.1 MG/DL (ref 0.2–1.2)
BILIRUB SERPL-MCNC: 1.3 MG/DL (ref 0.2–1.2)
BILIRUB SERPL-MCNC: 1.9 MG/DL (ref 0.2–1.2)
BILIRUB SERPL-MCNC: 2 MG/DL (ref 0.2–1.2)
BILIRUB UR QL STRIP: NEGATIVE
BODY TEMPERATURE: 37 C
BUN BLD-MCNC: 12 MG/DL (ref 8–23)
BUN BLD-MCNC: 13 MG/DL (ref 8–23)
BUN BLD-MCNC: 15 MG/DL (ref 8–23)
BUN BLD-MCNC: 17 MG/DL (ref 8–23)
BUN BLD-MCNC: 22 MG/DL (ref 8–23)
BUN BLD-MCNC: 22 MG/DL (ref 8–23)
BUN BLD-MCNC: 23 MG/DL (ref 8–23)
BUN BLD-MCNC: 24 MG/DL (ref 8–23)
BUN BLD-MCNC: 25 MG/DL (ref 8–23)
BUN BLD-MCNC: 26 MG/DL (ref 8–23)
BUN BLD-MCNC: 27 MG/DL (ref 8–23)
BUN BLD-MCNC: 31 MG/DL (ref 8–23)
BUN SERPL-MCNC: 16 MG/DL (ref 8–23)
BUN/CREAT SERPL: 13.8 (ref 7–25)
BUN/CREAT SERPL: 15 (ref 7–25)
BUN/CREAT SERPL: 15.3 (ref 7–25)
BUN/CREAT SERPL: 15.9 (ref 7–25)
BUN/CREAT SERPL: 16.4 (ref 7–25)
BUN/CREAT SERPL: 16.4 (ref 7–25)
BUN/CREAT SERPL: 16.7 (ref 7–25)
BUN/CREAT SERPL: 16.9 (ref 7–25)
BUN/CREAT SERPL: 19.3 (ref 7–25)
BUN/CREAT SERPL: 19.8 (ref 7–25)
BUN/CREAT SERPL: 23.3 (ref 7–25)
BUN/CREAT SERPL: 24.5 (ref 7–25)
BUN/CREAT SERPL: 25 (ref 7–25)
BUPRENORPHINE SERPL-MCNC: NEGATIVE NG/ML
C PNEUM DNA NPH QL NAA+NON-PROBE: NOT DETECTED
CALCIUM SERPL-MCNC: 9.3 MG/DL (ref 8.6–10.5)
CALCIUM SPEC-SCNC: 10.3 MG/DL (ref 8.6–10.5)
CALCIUM SPEC-SCNC: 8.6 MG/DL (ref 8.6–10.5)
CALCIUM SPEC-SCNC: 9.1 MG/DL (ref 8.6–10.5)
CALCIUM SPEC-SCNC: 9.2 MG/DL (ref 8.6–10.5)
CALCIUM SPEC-SCNC: 9.2 MG/DL (ref 8.6–10.5)
CALCIUM SPEC-SCNC: 9.3 MG/DL (ref 8.6–10.5)
CALCIUM SPEC-SCNC: 9.4 MG/DL (ref 8.6–10.5)
CALCIUM SPEC-SCNC: 9.5 MG/DL (ref 8.6–10.5)
CALCIUM SPEC-SCNC: 9.7 MG/DL (ref 8.6–10.5)
CANNABINOIDS SERPL QL: NEGATIVE
CHLORIDE SERPL-SCNC: 81 MMOL/L (ref 98–107)
CHLORIDE SERPL-SCNC: 82 MMOL/L (ref 98–107)
CHLORIDE SERPL-SCNC: 88 MMOL/L (ref 98–107)
CHLORIDE SERPL-SCNC: 90 MMOL/L (ref 98–107)
CHLORIDE SERPL-SCNC: 93 MMOL/L (ref 98–107)
CHLORIDE SERPL-SCNC: 93 MMOL/L (ref 98–107)
CHLORIDE SERPL-SCNC: 95 MMOL/L (ref 98–107)
CHLORIDE SERPL-SCNC: 96 MMOL/L (ref 98–107)
CHLORIDE SERPL-SCNC: 96 MMOL/L (ref 98–107)
CHLORIDE SERPL-SCNC: 97 MMOL/L (ref 98–107)
CK SERPL-CCNC: 105 U/L (ref 20–200)
CLARITY UR: CLEAR
CLARITY, POC: CLEAR
CO2 BLDA-SCNC: 22.1 MMOL/L (ref 22–33)
CO2 SERPL-SCNC: 19 MMOL/L (ref 22–29)
CO2 SERPL-SCNC: 20 MMOL/L (ref 22–29)
CO2 SERPL-SCNC: 20 MMOL/L (ref 22–29)
CO2 SERPL-SCNC: 21 MMOL/L (ref 22–29)
CO2 SERPL-SCNC: 22 MMOL/L (ref 22–29)
CO2 SERPL-SCNC: 23 MMOL/L (ref 22–29)
CO2 SERPL-SCNC: 24 MMOL/L (ref 22–29)
CO2 SERPL-SCNC: 25.2 MMOL/L (ref 22–29)
CO2 SERPL-SCNC: 27.6 MMOL/L (ref 22–29)
CO2 SERPL-SCNC: 30 MMOL/L (ref 22–29)
CO2 SERPL-SCNC: 31 MMOL/L (ref 22–29)
CO2 SERPL-SCNC: 35 MMOL/L (ref 22–29)
CO2 SERPL-SCNC: 35 MMOL/L (ref 22–29)
COCAINE UR QL: NEGATIVE
COHGB MFR BLD: 2.2 % (ref 0–2)
COLOR UR: ABNORMAL
CREAT BLD-MCNC: 0.8 MG/DL (ref 0.76–1.27)
CREAT BLD-MCNC: 0.85 MG/DL (ref 0.76–1.27)
CREAT BLD-MCNC: 0.89 MG/DL (ref 0.76–1.27)
CREAT BLD-MCNC: 1.03 MG/DL (ref 0.76–1.27)
CREAT BLD-MCNC: 1.04 MG/DL (ref 0.76–1.27)
CREAT BLD-MCNC: 1.07 MG/DL (ref 0.76–1.27)
CREAT BLD-MCNC: 1.1 MG/DL (ref 0.76–1.27)
CREAT BLD-MCNC: 1.26 MG/DL (ref 0.76–1.27)
CREAT BLD-MCNC: 1.34 MG/DL (ref 0.76–1.27)
CREAT BLD-MCNC: 1.4 MG/DL (ref 0.76–1.27)
CREAT BLD-MCNC: 1.59 MG/DL (ref 0.76–1.27)
CREAT BLD-MCNC: 1.61 MG/DL (ref 0.76–1.27)
CREAT SERPL-MCNC: 0.96 MG/DL (ref 0.76–1.27)
D-LACTATE SERPL-SCNC: 1.8 MMOL/L (ref 0.5–2)
D-LACTATE SERPL-SCNC: 2.4 MMOL/L (ref 0.5–2)
D-LACTATE SERPL-SCNC: 2.6 MMOL/L (ref 0.5–2)
DEPRECATED RDW RBC AUTO: 53.4 FL (ref 37–54)
DEPRECATED RDW RBC AUTO: 53.4 FL (ref 37–54)
DEPRECATED RDW RBC AUTO: 54.1 FL (ref 37–54)
DEPRECATED RDW RBC AUTO: 54.4 FL (ref 37–54)
DEPRECATED RDW RBC AUTO: 65.8 FL (ref 37–54)
DEPRECATED RDW RBC AUTO: 65.9 FL (ref 37–54)
DEPRECATED RDW RBC AUTO: 66.5 FL (ref 37–54)
DEPRECATED RDW RBC AUTO: 67.9 FL (ref 37–54)
DEPRECATED RDW RBC AUTO: 71 FL (ref 37–54)
DEPRECATED RDW RBC AUTO: 72.1 FL (ref 37–54)
DIGOXIN SERPL-MCNC: <0.3 NG/ML (ref 0.6–1.2)
EOSINOPHIL # BLD AUTO: 0 10*3/MM3 (ref 0–0.4)
EOSINOPHIL # BLD AUTO: 0.03 10*3/MM3 (ref 0–0.4)
EOSINOPHIL # BLD AUTO: 0.03 10*3/MM3 (ref 0–0.4)
EOSINOPHIL # BLD AUTO: 0.05 10*3/MM3 (ref 0–0.4)
EOSINOPHIL # BLD AUTO: 0.05 10*3/MM3 (ref 0–0.4)
EOSINOPHIL # BLD AUTO: 0.09 10*3/MM3 (ref 0–0.4)
EOSINOPHIL # BLD AUTO: 0.09 10*3/MM3 (ref 0–0.4)
EOSINOPHIL NFR BLD AUTO: 0 % (ref 0.3–6.2)
EOSINOPHIL NFR BLD AUTO: 0.3 % (ref 0.3–6.2)
EOSINOPHIL NFR BLD AUTO: 0.6 % (ref 0.3–6.2)
EOSINOPHIL NFR BLD AUTO: 0.7 % (ref 0.3–6.2)
EOSINOPHIL NFR BLD AUTO: 0.8 % (ref 0.3–6.2)
EOSINOPHIL NFR BLD AUTO: 2 % (ref 0.3–6.2)
EOSINOPHIL NFR BLD AUTO: 2.2 % (ref 0.3–6.2)
ERYTHROCYTE [DISTWIDTH] IN BLOOD BY AUTOMATED COUNT: 16.8 % (ref 12.3–15.4)
ERYTHROCYTE [DISTWIDTH] IN BLOOD BY AUTOMATED COUNT: 17.2 % (ref 12.3–15.4)
ERYTHROCYTE [DISTWIDTH] IN BLOOD BY AUTOMATED COUNT: 17.3 % (ref 12.3–15.4)
ERYTHROCYTE [DISTWIDTH] IN BLOOD BY AUTOMATED COUNT: 18 % (ref 12.3–15.4)
ERYTHROCYTE [DISTWIDTH] IN BLOOD BY AUTOMATED COUNT: 19.3 % (ref 12.3–15.4)
ERYTHROCYTE [DISTWIDTH] IN BLOOD BY AUTOMATED COUNT: 19.6 % (ref 12.3–15.4)
ERYTHROCYTE [DISTWIDTH] IN BLOOD BY AUTOMATED COUNT: 19.6 % (ref 12.3–15.4)
ERYTHROCYTE [DISTWIDTH] IN BLOOD BY AUTOMATED COUNT: 19.7 % (ref 12.3–15.4)
ERYTHROCYTE [DISTWIDTH] IN BLOOD BY AUTOMATED COUNT: 19.9 % (ref 12.3–15.4)
ERYTHROCYTE [DISTWIDTH] IN BLOOD BY AUTOMATED COUNT: 19.9 % (ref 12.3–15.4)
ERYTHROCYTE [DISTWIDTH] IN BLOOD BY AUTOMATED COUNT: 20.4 % (ref 12.3–15.4)
ETHANOL BLD-MCNC: <10 MG/DL (ref 0–10)
FERRITIN SERPL-MCNC: 72.3 NG/ML (ref 30–400)
FLUAV H1 2009 PAND RNA NPH QL NAA+PROBE: NOT DETECTED
FLUAV H1 HA GENE NPH QL NAA+PROBE: NOT DETECTED
FLUAV H3 RNA NPH QL NAA+PROBE: NOT DETECTED
FLUAV SUBTYP SPEC NAA+PROBE: NOT DETECTED
FLUBV RNA ISLT QL NAA+PROBE: NOT DETECTED
GFR SERPL CREATININE-BSD FRML MDRD: 42 ML/MIN/1.73
GFR SERPL CREATININE-BSD FRML MDRD: 43 ML/MIN/1.73
GFR SERPL CREATININE-BSD FRML MDRD: 50 ML/MIN/1.73
GFR SERPL CREATININE-BSD FRML MDRD: 52 ML/MIN/1.73
GFR SERPL CREATININE-BSD FRML MDRD: 56 ML/MIN/1.73
GFR SERPL CREATININE-BSD FRML MDRD: 66 ML/MIN/1.73
GFR SERPL CREATININE-BSD FRML MDRD: 68 ML/MIN/1.73
GFR SERPL CREATININE-BSD FRML MDRD: 70 ML/MIN/1.73
GFR SERPL CREATININE-BSD FRML MDRD: 71 ML/MIN/1.73
GFR SERPL CREATININE-BSD FRML MDRD: 84 ML/MIN/1.73
GFR SERPL CREATININE-BSD FRML MDRD: 88 ML/MIN/1.73
GFR SERPL CREATININE-BSD FRML MDRD: 95 ML/MIN/1.73
GLOBULIN UR ELPH-MCNC: 2.9 GM/DL
GLOBULIN UR ELPH-MCNC: 3 GM/DL
GLOBULIN UR ELPH-MCNC: 3.1 GM/DL
GLOBULIN UR ELPH-MCNC: 3.5 GM/DL
GLOBULIN UR ELPH-MCNC: 3.6 GM/DL
GLUCOSE BLD-MCNC: 101 MG/DL (ref 65–99)
GLUCOSE BLD-MCNC: 105 MG/DL (ref 65–99)
GLUCOSE BLD-MCNC: 105 MG/DL (ref 65–99)
GLUCOSE BLD-MCNC: 113 MG/DL (ref 65–99)
GLUCOSE BLD-MCNC: 114 MG/DL (ref 65–99)
GLUCOSE BLD-MCNC: 115 MG/DL (ref 65–99)
GLUCOSE BLD-MCNC: 116 MG/DL (ref 65–99)
GLUCOSE BLD-MCNC: 118 MG/DL (ref 65–99)
GLUCOSE BLD-MCNC: 136 MG/DL (ref 65–99)
GLUCOSE BLD-MCNC: 93 MG/DL (ref 65–99)
GLUCOSE BLD-MCNC: 97 MG/DL (ref 65–99)
GLUCOSE BLD-MCNC: 97 MG/DL (ref 65–99)
GLUCOSE BLDC GLUCOMTR-MCNC: 103 MG/DL (ref 70–130)
GLUCOSE BLDC GLUCOMTR-MCNC: 107 MG/DL (ref 70–130)
GLUCOSE BLDC GLUCOMTR-MCNC: 112 MG/DL (ref 70–130)
GLUCOSE BLDC GLUCOMTR-MCNC: 115 MG/DL (ref 70–130)
GLUCOSE BLDC GLUCOMTR-MCNC: 123 MG/DL (ref 70–130)
GLUCOSE BLDC GLUCOMTR-MCNC: 123 MG/DL (ref 70–130)
GLUCOSE BLDC GLUCOMTR-MCNC: 128 MG/DL (ref 70–130)
GLUCOSE BLDC GLUCOMTR-MCNC: 130 MG/DL (ref 70–130)
GLUCOSE BLDC GLUCOMTR-MCNC: 132 MG/DL (ref 70–130)
GLUCOSE BLDC GLUCOMTR-MCNC: 134 MG/DL (ref 70–130)
GLUCOSE BLDC GLUCOMTR-MCNC: 137 MG/DL (ref 70–130)
GLUCOSE BLDC GLUCOMTR-MCNC: 139 MG/DL (ref 70–130)
GLUCOSE BLDC GLUCOMTR-MCNC: 145 MG/DL (ref 70–130)
GLUCOSE BLDC GLUCOMTR-MCNC: 146 MG/DL (ref 70–130)
GLUCOSE BLDC GLUCOMTR-MCNC: 147 MG/DL (ref 70–130)
GLUCOSE BLDC GLUCOMTR-MCNC: 155 MG/DL (ref 70–130)
GLUCOSE BLDC GLUCOMTR-MCNC: 159 MG/DL (ref 70–130)
GLUCOSE BLDC GLUCOMTR-MCNC: 182 MG/DL (ref 70–130)
GLUCOSE SERPL-MCNC: 113 MG/DL (ref 65–99)
GLUCOSE UR STRIP-MCNC: NEGATIVE MG/DL
HADV DNA SPEC NAA+PROBE: NOT DETECTED
HBA1C MFR BLD: 4.8 % (ref 4.8–5.6)
HCO3 BLDA-SCNC: 21 MMOL/L (ref 20–26)
HCOV 229E RNA SPEC QL NAA+PROBE: NOT DETECTED
HCOV HKU1 RNA SPEC QL NAA+PROBE: NOT DETECTED
HCOV NL63 RNA SPEC QL NAA+PROBE: NOT DETECTED
HCOV OC43 RNA SPEC QL NAA+PROBE: NOT DETECTED
HCT VFR BLD AUTO: 28.8 % (ref 37.5–51)
HCT VFR BLD AUTO: 29 % (ref 37.5–51)
HCT VFR BLD AUTO: 31.2 % (ref 37.5–51)
HCT VFR BLD AUTO: 31.5 % (ref 37.5–51)
HCT VFR BLD AUTO: 32 % (ref 37.5–51)
HCT VFR BLD AUTO: 32 % (ref 37.5–51)
HCT VFR BLD AUTO: 32.2 % (ref 37.5–51)
HCT VFR BLD AUTO: 33 % (ref 37.5–51)
HCT VFR BLD AUTO: 33.1 % (ref 37.5–51)
HCT VFR BLD AUTO: 33.3 % (ref 37.5–51)
HCT VFR BLD AUTO: 34 % (ref 37.5–51)
HCT VFR BLD CALC: 30.6 %
HCYS SERPL-MCNC: 12.1 UMOL/L (ref 0–15)
HGB BLD-MCNC: 10 G/DL (ref 13–17.7)
HGB BLD-MCNC: 10.1 G/DL (ref 13–17.7)
HGB BLD-MCNC: 10.5 G/DL (ref 13–17.7)
HGB BLD-MCNC: 10.5 G/DL (ref 13–17.7)
HGB BLD-MCNC: 10.6 G/DL (ref 13–17.7)
HGB BLD-MCNC: 9.1 G/DL (ref 13–17.7)
HGB BLD-MCNC: 9.2 G/DL (ref 13–17.7)
HGB BLD-MCNC: 9.6 G/DL (ref 13–17.7)
HGB BLD-MCNC: 9.9 G/DL (ref 13–17.7)
HGB BLDA-MCNC: 10 G/DL (ref 13.5–17.5)
HGB UR QL STRIP.AUTO: ABNORMAL
HGB UR QL STRIP.AUTO: ABNORMAL
HGB UR QL STRIP.AUTO: NEGATIVE
HMPV RNA NPH QL NAA+NON-PROBE: NOT DETECTED
HOLD SPECIMEN: NORMAL
HOROWITZ INDEX BLD+IHG-RTO: 28 %
HPIV1 RNA SPEC QL NAA+PROBE: NOT DETECTED
HPIV2 RNA SPEC QL NAA+PROBE: NOT DETECTED
HPIV3 RNA NPH QL NAA+PROBE: NOT DETECTED
HPIV4 P GENE NPH QL NAA+PROBE: NOT DETECTED
HYALINE CASTS UR QL AUTO: ABNORMAL /LPF
HYALINE CASTS UR QL AUTO: NORMAL /LPF
HYALINE CASTS UR QL AUTO: NORMAL /LPF
HYPOCHROMIA BLD QL: NORMAL
IMM GRANULOCYTES # BLD AUTO: 0.01 10*3/MM3 (ref 0–0.05)
IMM GRANULOCYTES # BLD AUTO: 0.02 10*3/MM3 (ref 0–0.05)
IMM GRANULOCYTES # BLD AUTO: 0.03 10*3/MM3 (ref 0–0.05)
IMM GRANULOCYTES # BLD AUTO: 0.06 10*3/MM3 (ref 0–0.05)
IMM GRANULOCYTES # BLD AUTO: 0.19 10*3/MM3 (ref 0–0.05)
IMM GRANULOCYTES NFR BLD AUTO: 0.2 % (ref 0–0.5)
IMM GRANULOCYTES NFR BLD AUTO: 0.5 % (ref 0–0.5)
IMM GRANULOCYTES NFR BLD AUTO: 0.5 % (ref 0–0.5)
IMM GRANULOCYTES NFR BLD AUTO: 0.7 % (ref 0–0.5)
IMM GRANULOCYTES NFR BLD AUTO: 1 % (ref 0–0.5)
INR PPP: 1.41 (ref 0.85–1.16)
IRON 24H UR-MRATE: 35 MCG/DL (ref 59–158)
IRON SATN MFR SERPL: 9 % (ref 20–50)
KETONES UR QL STRIP: ABNORMAL
KETONES UR QL STRIP: NEGATIVE
KETONES UR QL STRIP: NEGATIVE
KETONES UR QL: NEGATIVE
LACTATE HOLD SPECIMEN: NORMAL
LEFT ATRIUM VOLUME INDEX: 44.3 ML/M^2
LEFT ATRIUM VOLUME: 89 ML
LEUKOCYTE EST, POC: NEGATIVE
LEUKOCYTE ESTERASE UR QL STRIP.AUTO: NEGATIVE
LYMPHOCYTES # BLD AUTO: 0.46 10*3/MM3 (ref 0.7–3.1)
LYMPHOCYTES # BLD AUTO: 0.47 10*3/MM3 (ref 0.7–3.1)
LYMPHOCYTES # BLD AUTO: 0.48 10*3/MM3 (ref 0.7–3.1)
LYMPHOCYTES # BLD AUTO: 0.5 10*3/MM3 (ref 0.7–3.1)
LYMPHOCYTES # BLD AUTO: 0.51 10*3/MM3 (ref 0.7–3.1)
LYMPHOCYTES # BLD AUTO: 0.53 10*3/MM3 (ref 0.7–3.1)
LYMPHOCYTES # BLD AUTO: 0.6 10*3/MM3 (ref 0.7–3.1)
LYMPHOCYTES # BLD AUTO: 0.63 10*3/MM3 (ref 0.7–3.1)
LYMPHOCYTES NFR BLD AUTO: 10.6 % (ref 19.6–45.3)
LYMPHOCYTES NFR BLD AUTO: 10.8 % (ref 19.6–45.3)
LYMPHOCYTES NFR BLD AUTO: 11.6 % (ref 19.6–45.3)
LYMPHOCYTES NFR BLD AUTO: 12.3 % (ref 19.6–45.3)
LYMPHOCYTES NFR BLD AUTO: 15.7 % (ref 19.6–45.3)
LYMPHOCYTES NFR BLD AUTO: 2.8 % (ref 19.6–45.3)
LYMPHOCYTES NFR BLD AUTO: 5.3 % (ref 19.6–45.3)
LYMPHOCYTES NFR BLD AUTO: 9.9 % (ref 19.6–45.3)
Lab: ABNORMAL
M PNEUMO IGG SER IA-ACNC: NOT DETECTED
MAGNESIUM SERPL-MCNC: 1.8 MG/DL (ref 1.6–2.4)
MAGNESIUM SERPL-MCNC: 2 MG/DL (ref 1.6–2.4)
MAGNESIUM SERPL-MCNC: 2.2 MG/DL (ref 1.6–2.4)
MAGNESIUM SERPL-MCNC: 2.2 MG/DL (ref 1.6–2.4)
MAGNESIUM SERPL-MCNC: 2.3 MG/DL (ref 1.6–2.4)
MAGNESIUM SERPL-MCNC: 2.5 MG/DL (ref 1.6–2.4)
MAXIMAL PREDICTED HEART RATE: 147 BPM
MCH RBC QN AUTO: 25.9 PG (ref 26.6–33)
MCH RBC QN AUTO: 27 PG (ref 26.6–33)
MCH RBC QN AUTO: 27.3 PG (ref 26.6–33)
MCH RBC QN AUTO: 27.7 PG (ref 26.6–33)
MCH RBC QN AUTO: 28.1 PG (ref 26.6–33)
MCH RBC QN AUTO: 28.8 PG (ref 26.6–33)
MCH RBC QN AUTO: 28.9 PG (ref 26.6–33)
MCH RBC QN AUTO: 29.1 PG (ref 26.6–33)
MCH RBC QN AUTO: 29.1 PG (ref 26.6–33)
MCH RBC QN AUTO: 29.4 PG (ref 26.6–33)
MCH RBC QN AUTO: 29.7 PG (ref 26.6–33)
MCHC RBC AUTO-ENTMCNC: 29.7 G/DL (ref 31.5–35.7)
MCHC RBC AUTO-ENTMCNC: 30 G/DL (ref 31.5–35.7)
MCHC RBC AUTO-ENTMCNC: 30.9 G/DL (ref 31.5–35.7)
MCHC RBC AUTO-ENTMCNC: 31.1 G/DL (ref 31.5–35.7)
MCHC RBC AUTO-ENTMCNC: 31.3 G/DL (ref 31.5–35.7)
MCHC RBC AUTO-ENTMCNC: 31.6 G/DL (ref 31.5–35.7)
MCHC RBC AUTO-ENTMCNC: 31.7 G/DL (ref 31.5–35.7)
MCHC RBC AUTO-ENTMCNC: 32.1 G/DL (ref 31.5–35.7)
MCHC RBC AUTO-ENTMCNC: 32.4 G/DL (ref 31.5–35.7)
MCV RBC AUTO: 83.4 FL (ref 79–97)
MCV RBC AUTO: 86 FL (ref 79–97)
MCV RBC AUTO: 86.4 FL (ref 79–97)
MCV RBC AUTO: 87.2 FL (ref 79–97)
MCV RBC AUTO: 88.5 FL (ref 79–97)
MCV RBC AUTO: 92 FL (ref 79–97)
MCV RBC AUTO: 92.5 FL (ref 79–97)
MCV RBC AUTO: 92.8 FL (ref 79–97)
MCV RBC AUTO: 93.5 FL (ref 79–97)
MCV RBC AUTO: 94.2 FL (ref 79–97)
MCV RBC AUTO: 98.2 FL (ref 79–97)
METHADONE UR QL SCN: NEGATIVE
METHGB BLD QL: 0.7 % (ref 0–1.5)
METHYLMALONATE SERPL-SCNC: 512 NMOL/L (ref 0–378)
MODALITY: ABNORMAL
MONOCYTES # BLD AUTO: 0.4 10*3/MM3 (ref 0.1–0.9)
MONOCYTES # BLD AUTO: 0.61 10*3/MM3 (ref 0.1–0.9)
MONOCYTES # BLD AUTO: 0.63 10*3/MM3 (ref 0.1–0.9)
MONOCYTES # BLD AUTO: 0.64 10*3/MM3 (ref 0.1–0.9)
MONOCYTES # BLD AUTO: 0.69 10*3/MM3 (ref 0.1–0.9)
MONOCYTES # BLD AUTO: 0.76 10*3/MM3 (ref 0.1–0.9)
MONOCYTES # BLD AUTO: 1.1 10*3/MM3 (ref 0.1–0.9)
MONOCYTES # BLD AUTO: 2.03 10*3/MM3 (ref 0.1–0.9)
MONOCYTES NFR BLD AUTO: 10.6 % (ref 5–12)
MONOCYTES NFR BLD AUTO: 11.2 % (ref 5–12)
MONOCYTES NFR BLD AUTO: 12.7 % (ref 5–12)
MONOCYTES NFR BLD AUTO: 12.8 % (ref 5–12)
MONOCYTES NFR BLD AUTO: 13 % (ref 5–12)
MONOCYTES NFR BLD AUTO: 14.7 % (ref 5–12)
MONOCYTES NFR BLD AUTO: 15 % (ref 5–12)
MONOCYTES NFR BLD AUTO: 15.8 % (ref 5–12)
MRSA DNA SPEC QL NAA+PROBE: NEGATIVE
NEUTROPHILS # BLD AUTO: 16.29 10*3/MM3 (ref 1.7–7)
NEUTROPHILS # BLD AUTO: 2.87 10*3/MM3 (ref 1.7–7)
NEUTROPHILS # BLD AUTO: 2.89 10*3/MM3 (ref 1.7–7)
NEUTROPHILS # BLD AUTO: 2.9 10*3/MM3 (ref 1.7–7)
NEUTROPHILS # BLD AUTO: 3.3 10*3/MM3 (ref 1.7–7)
NEUTROPHILS # BLD AUTO: 3.66 10*3/MM3 (ref 1.7–7)
NEUTROPHILS # BLD AUTO: 4.45 10*3/MM3 (ref 1.7–7)
NEUTROPHILS # BLD AUTO: 7.04 10*3/MM3 (ref 1.7–7)
NEUTROPHILS NFR BLD AUTO: 69.1 % (ref 42.7–76)
NEUTROPHILS NFR BLD AUTO: 71.2 % (ref 42.7–76)
NEUTROPHILS NFR BLD AUTO: 71.6 % (ref 42.7–76)
NEUTROPHILS NFR BLD AUTO: 73.1 % (ref 42.7–76)
NEUTROPHILS NFR BLD AUTO: 74.6 % (ref 42.7–76)
NEUTROPHILS NFR BLD AUTO: 75.7 % (ref 42.7–76)
NEUTROPHILS NFR BLD AUTO: 81.1 % (ref 42.7–76)
NEUTROPHILS NFR BLD AUTO: 85.1 % (ref 42.7–76)
NITRITE UR QL STRIP: NEGATIVE
NITRITE UR QL STRIP: NEGATIVE
NITRITE UR QL STRIP: POSITIVE
NITRITE UR-MCNC: NEGATIVE MG/ML
NOTE: ABNORMAL
NRBC BLD AUTO-RTO: 0 /100 WBC (ref 0–0.2)
NT-PROBNP SERPL-MCNC: 4122 PG/ML (ref 5–900)
NT-PROBNP SERPL-MCNC: 4692 PG/ML (ref 5–900)
NT-PROBNP SERPL-MCNC: ABNORMAL PG/ML (ref 5–900)
OPIATES UR QL: NEGATIVE
OTHER ANTIBIOTIC SUSC ISLT: ABNORMAL
OXYCODONE UR QL SCN: POSITIVE
OXYHGB MFR BLDV: 92.1 % (ref 94–99)
PCO2 BLDA: 35.7 MM HG
PCO2 TEMP ADJ BLD: 35.7 MM HG (ref 35–48)
PCP UR QL SCN: NEGATIVE
PH BLDA: 7.38 PH UNITS (ref 7.35–7.45)
PH UR STRIP.AUTO: 7 [PH] (ref 5–8)
PH UR STRIP.AUTO: 8 [PH] (ref 5–8)
PH UR STRIP.AUTO: <=5 [PH] (ref 5–8)
PH UR: 7 [PH] (ref 5–8)
PH, TEMP CORRECTED: 7.38 PH UNITS
PLAT MORPH BLD: NORMAL
PLATELET # BLD AUTO: 118 10*3/MM3 (ref 140–450)
PLATELET # BLD AUTO: 119 10*3/MM3 (ref 140–450)
PLATELET # BLD AUTO: 120 10*3/MM3 (ref 140–450)
PLATELET # BLD AUTO: 120 10*3/MM3 (ref 140–450)
PLATELET # BLD AUTO: 125 10*3/MM3 (ref 140–450)
PLATELET # BLD AUTO: 128 10*3/MM3 (ref 140–450)
PLATELET # BLD AUTO: 132 10*3/MM3 (ref 140–450)
PLATELET # BLD AUTO: 133 10*3/MM3 (ref 140–450)
PLATELET # BLD AUTO: 136 10*3/MM3 (ref 140–450)
PLATELET # BLD AUTO: 153 10*3/MM3 (ref 140–450)
PLATELET # BLD AUTO: 171 10*3/MM3 (ref 140–450)
PMV BLD AUTO: 6.9 FL (ref 6–12)
PMV BLD AUTO: 8.9 FL (ref 6–12)
PMV BLD AUTO: 9.1 FL (ref 6–12)
PMV BLD AUTO: 9.2 FL (ref 6–12)
PMV BLD AUTO: 9.3 FL (ref 6–12)
PMV BLD AUTO: 9.7 FL (ref 6–12)
PMV BLD AUTO: 9.8 FL (ref 6–12)
PMV BLD AUTO: 9.9 FL (ref 6–12)
PMV BLD AUTO: 9.9 FL (ref 6–12)
PO2 BLDA: 73.3 MM HG (ref 83–108)
PO2 TEMP ADJ BLD: 73.3 MM HG (ref 83–108)
POTASSIUM BLD-SCNC: 2.1 MMOL/L (ref 3.5–5.2)
POTASSIUM BLD-SCNC: 3.1 MMOL/L (ref 3.5–5.2)
POTASSIUM BLD-SCNC: 3.6 MMOL/L (ref 3.5–5.2)
POTASSIUM BLD-SCNC: 3.8 MMOL/L (ref 3.5–5.2)
POTASSIUM BLD-SCNC: 3.8 MMOL/L (ref 3.5–5.2)
POTASSIUM BLD-SCNC: 3.9 MMOL/L (ref 3.5–5.2)
POTASSIUM BLD-SCNC: 3.9 MMOL/L (ref 3.5–5.2)
POTASSIUM BLD-SCNC: 4 MMOL/L (ref 3.5–5.2)
POTASSIUM BLD-SCNC: 4 MMOL/L (ref 3.5–5.2)
POTASSIUM BLD-SCNC: 4.1 MMOL/L (ref 3.5–5.2)
POTASSIUM BLD-SCNC: 4.3 MMOL/L (ref 3.5–5.2)
POTASSIUM BLD-SCNC: 4.3 MMOL/L (ref 3.5–5.2)
POTASSIUM BLD-SCNC: 4.5 MMOL/L (ref 3.5–5.2)
POTASSIUM SERPL-SCNC: 3.8 MMOL/L (ref 3.5–5.2)
PROCALCITONIN SERPL-MCNC: 0.31 NG/ML (ref 0.1–0.25)
PROCALCITONIN SERPL-MCNC: 0.63 NG/ML (ref 0.1–0.25)
PROCALCITONIN SERPL-MCNC: 1.32 NG/ML (ref 0.1–0.25)
PROPOXYPH UR QL: NEGATIVE
PROT SERPL-MCNC: 6.6 G/DL (ref 6–8.5)
PROT SERPL-MCNC: 6.6 G/DL (ref 6–8.5)
PROT SERPL-MCNC: 6.9 G/DL (ref 6–8.5)
PROT SERPL-MCNC: 6.9 G/DL (ref 6–8.5)
PROT SERPL-MCNC: 7.3 G/DL (ref 6–8.5)
PROT UR QL STRIP: ABNORMAL
PROT UR STRIP-MCNC: ABNORMAL MG/DL
PROTHROMBIN TIME: 16.6 SECONDS (ref 11.2–14.3)
RBC # BLD AUTO: 3.1 10*6/MM3 (ref 4.14–5.8)
RBC # BLD AUTO: 3.13 10*6/MM3 (ref 4.14–5.8)
RBC # BLD AUTO: 3.26 10*6/MM3 (ref 4.14–5.8)
RBC # BLD AUTO: 3.46 10*6/MM3 (ref 4.14–5.8)
RBC # BLD AUTO: 3.47 10*6/MM3 (ref 4.14–5.8)
RBC # BLD AUTO: 3.56 10*6/MM3 (ref 4.14–5.8)
RBC # BLD AUTO: 3.61 10*6/MM3 (ref 4.14–5.8)
RBC # BLD AUTO: 3.74 10*6/MM3 (ref 4.14–5.8)
RBC # BLD AUTO: 3.81 10*6/MM3 (ref 4.14–5.8)
RBC # BLD AUTO: 3.82 10*6/MM3 (ref 4.14–5.8)
RBC # BLD AUTO: 3.85 10*6/MM3 (ref 4.14–5.8)
RBC # UR STRIP: NEGATIVE /UL
RBC # UR: ABNORMAL /HPF
RBC # UR: NORMAL /HPF
RBC # UR: NORMAL /HPF
REF LAB TEST METHOD: ABNORMAL
REF LAB TEST METHOD: NORMAL
REF LAB TEST METHOD: NORMAL
RHINOVIRUS RNA SPEC NAA+PROBE: NOT DETECTED
RSV RNA NPH QL NAA+NON-PROBE: NOT DETECTED
SALICYLATES SERPL-MCNC: <0.3 MG/DL
SODIUM BLD-SCNC: 126 MMOL/L (ref 136–145)
SODIUM BLD-SCNC: 128 MMOL/L (ref 136–145)
SODIUM BLD-SCNC: 130 MMOL/L (ref 136–145)
SODIUM BLD-SCNC: 132 MMOL/L (ref 136–145)
SODIUM SERPL-SCNC: 135 MMOL/L (ref 136–145)
SP GR UR STRIP: 1.01 (ref 1–1.03)
SP GR UR STRIP: 1.01 (ref 1–1.03)
SP GR UR STRIP: 1.05 (ref 1–1.03)
SP GR UR: 1.01 (ref 1–1.03)
SQUAMOUS #/AREA URNS HPF: ABNORMAL /HPF
SQUAMOUS #/AREA URNS HPF: NORMAL /HPF
SQUAMOUS #/AREA URNS HPF: NORMAL /HPF
STRESS TARGET HR: 125 BPM
T4 FREE SERPL-MCNC: 1.24 NG/DL (ref 0.93–1.7)
T4 FREE SERPL-MCNC: 1.25 NG/DL (ref 0.93–1.7)
TIBC SERPL-MCNC: 373 MCG/DL (ref 298–536)
TRANSFERRIN SERPL-MCNC: 250 MG/DL (ref 200–360)
TRICYCLICS UR QL SCN: NEGATIVE
TROPONIN T SERPL-MCNC: 0.02 NG/ML (ref 0–0.03)
TROPONIN T SERPL-MCNC: 0.05 NG/ML (ref 0–0.03)
TROPONIN T SERPL-MCNC: 0.05 NG/ML (ref 0–0.03)
TROPONIN T SERPL-MCNC: 0.08 NG/ML (ref 0–0.03)
TROPONIN T SERPL-MCNC: 0.09 NG/ML (ref 0–0.03)
TROPONIN T SERPL-MCNC: 0.09 NG/ML (ref 0–0.03)
TSH SERPL DL<=0.005 MIU/L-ACNC: 10.1 UIU/ML (ref 0.27–4.2)
TSH SERPL DL<=0.05 MIU/L-ACNC: 14.3 UIU/ML (ref 0.27–4.2)
TSH SERPL DL<=0.05 MIU/L-ACNC: 4.2 UIU/ML (ref 0.27–4.2)
TSH SERPL DL<=0.05 MIU/L-ACNC: 6.11 UIU/ML (ref 0.27–4.2)
UROBILINOGEN UR QL STRIP: ABNORMAL
UROBILINOGEN UR QL: NORMAL
VENTILATOR MODE: ABNORMAL
WBC MORPH BLD: NORMAL
WBC NRBC COR # BLD: 19.12 10*3/MM3 (ref 3.4–10.8)
WBC NRBC COR # BLD: 3.9 10*3/MM3 (ref 3.4–10.8)
WBC NRBC COR # BLD: 3.98 10*3/MM3 (ref 3.4–10.8)
WBC NRBC COR # BLD: 4.06 10*3/MM3 (ref 3.4–10.8)
WBC NRBC COR # BLD: 4.15 10*3/MM3 (ref 3.4–10.8)
WBC NRBC COR # BLD: 4.61 10*3/MM3 (ref 3.4–10.8)
WBC NRBC COR # BLD: 4.84 10*3/MM3 (ref 3.4–10.8)
WBC NRBC COR # BLD: 5.96 10*3/MM3 (ref 3.4–10.8)
WBC NRBC COR # BLD: 6.06 10*3/MM3 (ref 3.4–10.8)
WBC NRBC COR # BLD: 8.68 10*3/MM3 (ref 3.4–10.8)
WBC NRBC COR # BLD: 8.96 10*3/MM3 (ref 3.4–10.8)
WBC UR QL AUTO: ABNORMAL /HPF
WBC UR QL AUTO: NORMAL /HPF
WBC UR QL AUTO: NORMAL /HPF
WHOLE BLOOD HOLD SPECIMEN: NORMAL

## 2020-01-01 PROCEDURE — 82728 ASSAY OF FERRITIN: CPT

## 2020-01-01 PROCEDURE — 63710000001 INSULIN LISPRO (HUMAN) PER 5 UNITS: Performed by: INTERNAL MEDICINE

## 2020-01-01 PROCEDURE — C1769 GUIDE WIRE: HCPCS | Performed by: INTERNAL MEDICINE

## 2020-01-01 PROCEDURE — 99285 EMERGENCY DEPT VISIT HI MDM: CPT

## 2020-01-01 PROCEDURE — 85027 COMPLETE CBC AUTOMATED: CPT | Performed by: INTERNAL MEDICINE

## 2020-01-01 PROCEDURE — 84484 ASSAY OF TROPONIN QUANT: CPT | Performed by: EMERGENCY MEDICINE

## 2020-01-01 PROCEDURE — 25010000002 HEPARIN (PORCINE) PER 1000 UNITS: Performed by: INTERNAL MEDICINE

## 2020-01-01 PROCEDURE — 85025 COMPLETE CBC W/AUTO DIFF WBC: CPT

## 2020-01-01 PROCEDURE — 25010000002 EPINEPHRINE PF 1 MG/10ML SOLUTION PREFILLED SYRINGE: Performed by: HOSPITALIST

## 2020-01-01 PROCEDURE — 25010000002 DIGOXIN PER 500 MCG: Performed by: PHYSICIAN ASSISTANT

## 2020-01-01 PROCEDURE — 25010000002 HEPARIN (PORCINE) PER 1000 UNITS: Performed by: HOSPITALIST

## 2020-01-01 PROCEDURE — G0378 HOSPITAL OBSERVATION PER HR: HCPCS

## 2020-01-01 PROCEDURE — 94799 UNLISTED PULMONARY SVC/PX: CPT

## 2020-01-01 PROCEDURE — 99232 SBSQ HOSP IP/OBS MODERATE 35: CPT | Performed by: INTERNAL MEDICINE

## 2020-01-01 PROCEDURE — 93005 ELECTROCARDIOGRAM TRACING: CPT | Performed by: EMERGENCY MEDICINE

## 2020-01-01 PROCEDURE — C1892 INTRO/SHEATH,FIXED,PEEL-AWAY: HCPCS | Performed by: INTERNAL MEDICINE

## 2020-01-01 PROCEDURE — 99226 PR SBSQ OBSERVATION CARE/DAY 35 MINUTES: CPT | Performed by: HOSPITALIST

## 2020-01-01 PROCEDURE — 83735 ASSAY OF MAGNESIUM: CPT | Performed by: EMERGENCY MEDICINE

## 2020-01-01 PROCEDURE — 25010000002 PIPERACILLIN SOD-TAZOBACTAM PER 1 G: Performed by: INTERNAL MEDICINE

## 2020-01-01 PROCEDURE — 84145 PROCALCITONIN (PCT): CPT | Performed by: EMERGENCY MEDICINE

## 2020-01-01 PROCEDURE — 99220 PR INITIAL OBSERVATION CARE/DAY 70 MINUTES: CPT | Performed by: INTERNAL MEDICINE

## 2020-01-01 PROCEDURE — 02583ZZ DESTRUCTION OF CONDUCTION MECHANISM, PERCUTANEOUS APPROACH: ICD-10-PCS | Performed by: INTERNAL MEDICINE

## 2020-01-01 PROCEDURE — 25010000002 MIDAZOLAM PER 1 MG: Performed by: INTERNAL MEDICINE

## 2020-01-01 PROCEDURE — 85025 COMPLETE CBC W/AUTO DIFF WBC: CPT | Performed by: INTERNAL MEDICINE

## 2020-01-01 PROCEDURE — 71275 CT ANGIOGRAPHY CHEST: CPT

## 2020-01-01 PROCEDURE — 93650 ICAR CATH ABLTJ AV NODE FUNC: CPT | Performed by: INTERNAL MEDICINE

## 2020-01-01 PROCEDURE — 81001 URINALYSIS AUTO W/SCOPE: CPT | Performed by: INTERNAL MEDICINE

## 2020-01-01 PROCEDURE — 84145 PROCALCITONIN (PCT): CPT

## 2020-01-01 PROCEDURE — 83605 ASSAY OF LACTIC ACID: CPT | Performed by: EMERGENCY MEDICINE

## 2020-01-01 PROCEDURE — 84443 ASSAY THYROID STIM HORMONE: CPT | Performed by: EMERGENCY MEDICINE

## 2020-01-01 PROCEDURE — 83880 ASSAY OF NATRIURETIC PEPTIDE: CPT | Performed by: INTERNAL MEDICINE

## 2020-01-01 PROCEDURE — 82962 GLUCOSE BLOOD TEST: CPT

## 2020-01-01 PROCEDURE — 80048 BASIC METABOLIC PNL TOTAL CA: CPT | Performed by: HOSPITALIST

## 2020-01-01 PROCEDURE — 97166 OT EVAL MOD COMPLEX 45 MIN: CPT

## 2020-01-01 PROCEDURE — C1733 CATH, EP, OTHR THAN COOL-TIP: HCPCS | Performed by: INTERNAL MEDICINE

## 2020-01-01 PROCEDURE — 80053 COMPREHEN METABOLIC PANEL: CPT | Performed by: EMERGENCY MEDICINE

## 2020-01-01 PROCEDURE — 99496 TRANSJ CARE MGMT HIGH F2F 7D: CPT | Performed by: PHYSICIAN ASSISTANT

## 2020-01-01 PROCEDURE — C1898 LEAD, PMKR, OTHER THAN TRANS: HCPCS | Performed by: INTERNAL MEDICINE

## 2020-01-01 PROCEDURE — 25010000002 AMIODARONE PER 30 MG: Performed by: HOSPITALIST

## 2020-01-01 PROCEDURE — 36600 WITHDRAWAL OF ARTERIAL BLOOD: CPT

## 2020-01-01 PROCEDURE — 25010000002 PIPERACILLIN SOD-TAZOBACTAM PER 1 G: Performed by: EMERGENCY MEDICINE

## 2020-01-01 PROCEDURE — 99236 HOSP IP/OBS SAME DATE HI 85: CPT | Performed by: HOSPITALIST

## 2020-01-01 PROCEDURE — P9612 CATHETERIZE FOR URINE SPEC: HCPCS

## 2020-01-01 PROCEDURE — 99217 PR OBSERVATION CARE DISCHARGE MANAGEMENT: CPT | Performed by: HOSPITALIST

## 2020-01-01 PROCEDURE — 83036 HEMOGLOBIN GLYCOSYLATED A1C: CPT | Performed by: INTERNAL MEDICINE

## 2020-01-01 PROCEDURE — 25010000002 PHENYLEPHRINE PER 1 ML: Performed by: INTERNAL MEDICINE

## 2020-01-01 PROCEDURE — 94640 AIRWAY INHALATION TREATMENT: CPT

## 2020-01-01 PROCEDURE — 96372 THER/PROPH/DIAG INJ SC/IM: CPT

## 2020-01-01 PROCEDURE — 99152 MOD SED SAME PHYS/QHP 5/>YRS: CPT | Performed by: INTERNAL MEDICINE

## 2020-01-01 PROCEDURE — 92610 EVALUATE SWALLOWING FUNCTION: CPT

## 2020-01-01 PROCEDURE — 87040 BLOOD CULTURE FOR BACTERIA: CPT | Performed by: EMERGENCY MEDICINE

## 2020-01-01 PROCEDURE — 80307 DRUG TEST PRSMV CHEM ANLYZR: CPT | Performed by: EMERGENCY MEDICINE

## 2020-01-01 PROCEDURE — 81001 URINALYSIS AUTO W/SCOPE: CPT | Performed by: EMERGENCY MEDICINE

## 2020-01-01 PROCEDURE — 0JH607Z INSERTION OF CARDIAC RESYNCHRONIZATION PACEMAKER PULSE GENERATOR INTO CHEST SUBCUTANEOUS TISSUE AND FASCIA, OPEN APPROACH: ICD-10-PCS | Performed by: INTERNAL MEDICINE

## 2020-01-01 PROCEDURE — 83735 ASSAY OF MAGNESIUM: CPT | Performed by: INTERNAL MEDICINE

## 2020-01-01 PROCEDURE — C1894 INTRO/SHEATH, NON-LASER: HCPCS | Performed by: INTERNAL MEDICINE

## 2020-01-01 PROCEDURE — 25010000002 NALOXONE PER 1 MG: Performed by: EMERGENCY MEDICINE

## 2020-01-01 PROCEDURE — 71045 X-RAY EXAM CHEST 1 VIEW: CPT

## 2020-01-01 PROCEDURE — 99284 EMERGENCY DEPT VISIT MOD MDM: CPT

## 2020-01-01 PROCEDURE — 80048 BASIC METABOLIC PNL TOTAL CA: CPT | Performed by: INTERNAL MEDICINE

## 2020-01-01 PROCEDURE — 81003 URINALYSIS AUTO W/O SCOPE: CPT | Performed by: PHYSICIAN ASSISTANT

## 2020-01-01 PROCEDURE — C1887 CATHETER, GUIDING: HCPCS | Performed by: INTERNAL MEDICINE

## 2020-01-01 PROCEDURE — 87641 MR-STAPH DNA AMP PROBE: CPT

## 2020-01-01 PROCEDURE — 84439 ASSAY OF FREE THYROXINE: CPT | Performed by: EMERGENCY MEDICINE

## 2020-01-01 PROCEDURE — 93005 ELECTROCARDIOGRAM TRACING: CPT | Performed by: PHYSICIAN ASSISTANT

## 2020-01-01 PROCEDURE — 99204 OFFICE O/P NEW MOD 45 MIN: CPT | Performed by: INTERNAL MEDICINE

## 2020-01-01 PROCEDURE — 70450 CT HEAD/BRAIN W/O DYE: CPT

## 2020-01-01 PROCEDURE — 85025 COMPLETE CBC W/AUTO DIFF WBC: CPT | Performed by: EMERGENCY MEDICINE

## 2020-01-01 PROCEDURE — 83540 ASSAY OF IRON: CPT

## 2020-01-01 PROCEDURE — 33207 INSERT HEART PM VENTRICULAR: CPT | Performed by: INTERNAL MEDICINE

## 2020-01-01 PROCEDURE — 92950 HEART/LUNG RESUSCITATION CPR: CPT

## 2020-01-01 PROCEDURE — 80053 COMPREHEN METABOLIC PANEL: CPT | Performed by: INTERNAL MEDICINE

## 2020-01-01 PROCEDURE — 82550 ASSAY OF CK (CPK): CPT | Performed by: EMERGENCY MEDICINE

## 2020-01-01 PROCEDURE — 97161 PT EVAL LOW COMPLEX 20 MIN: CPT

## 2020-01-01 PROCEDURE — 93306 TTE W/DOPPLER COMPLETE: CPT

## 2020-01-01 PROCEDURE — 85007 BL SMEAR W/DIFF WBC COUNT: CPT | Performed by: EMERGENCY MEDICINE

## 2020-01-01 PROCEDURE — 02HL3JZ INSERTION OF PACEMAKER LEAD INTO LEFT VENTRICLE, PERCUTANEOUS APPROACH: ICD-10-PCS | Performed by: FAMILY MEDICINE

## 2020-01-01 PROCEDURE — 25010000002 FUROSEMIDE PER 20 MG: Performed by: INTERNAL MEDICINE

## 2020-01-01 PROCEDURE — 99239 HOSP IP/OBS DSCHRG MGMT >30: CPT | Performed by: INTERNAL MEDICINE

## 2020-01-01 PROCEDURE — 99214 OFFICE O/P EST MOD 30 MIN: CPT | Performed by: NURSE PRACTITIONER

## 2020-01-01 PROCEDURE — C1900 LEAD, CORONARY VENOUS: HCPCS | Performed by: INTERNAL MEDICINE

## 2020-01-01 PROCEDURE — 02HK3JZ INSERTION OF PACEMAKER LEAD INTO RIGHT VENTRICLE, PERCUTANEOUS APPROACH: ICD-10-PCS | Performed by: INTERNAL MEDICINE

## 2020-01-01 PROCEDURE — 36415 COLL VENOUS BLD VENIPUNCTURE: CPT | Performed by: PHYSICIAN ASSISTANT

## 2020-01-01 PROCEDURE — C1730 CATH, EP, 19 OR FEW ELECT: HCPCS | Performed by: INTERNAL MEDICINE

## 2020-01-01 PROCEDURE — 25010000002 AMIODARONE PER 30 MG: Performed by: INTERNAL MEDICINE

## 2020-01-01 PROCEDURE — 92526 ORAL FUNCTION THERAPY: CPT

## 2020-01-01 PROCEDURE — 25010000002 MAGNESIUM SULFATE PER 500 MG OF MAGNESIUM: Performed by: HOSPITALIST

## 2020-01-01 PROCEDURE — 99223 1ST HOSP IP/OBS HIGH 75: CPT | Performed by: INTERNAL MEDICINE

## 2020-01-01 PROCEDURE — 97166 OT EVAL MOD COMPLEX 45 MIN: CPT | Performed by: OCCUPATIONAL THERAPIST

## 2020-01-01 PROCEDURE — 99232 SBSQ HOSP IP/OBS MODERATE 35: CPT | Performed by: PHYSICIAN ASSISTANT

## 2020-01-01 PROCEDURE — C2621 PMKR, OTHER THAN SING/DUAL: HCPCS | Performed by: INTERNAL MEDICINE

## 2020-01-01 PROCEDURE — 84466 ASSAY OF TRANSFERRIN: CPT

## 2020-01-01 PROCEDURE — 0100U HC BIOFIRE FILMARRAY RESP PANEL 2: CPT | Performed by: EMERGENCY MEDICINE

## 2020-01-01 PROCEDURE — 99153 MOD SED SAME PHYS/QHP EA: CPT | Performed by: INTERNAL MEDICINE

## 2020-01-01 PROCEDURE — 80048 BASIC METABOLIC PNL TOTAL CA: CPT

## 2020-01-01 PROCEDURE — 85025 COMPLETE CBC W/AUTO DIFF WBC: CPT | Performed by: NURSE PRACTITIONER

## 2020-01-01 PROCEDURE — 84132 ASSAY OF SERUM POTASSIUM: CPT | Performed by: HOSPITALIST

## 2020-01-01 PROCEDURE — 36415 COLL VENOUS BLD VENIPUNCTURE: CPT

## 2020-01-01 PROCEDURE — 76705 ECHO EXAM OF ABDOMEN: CPT

## 2020-01-01 PROCEDURE — 25810000003 SODIUM CHLORIDE 0.9 % WITH KCL 40 MEQ/L 40-0.9 MEQ/L-% SOLUTION: Performed by: EMERGENCY MEDICINE

## 2020-01-01 PROCEDURE — 0 IOPAMIDOL PER 1 ML: Performed by: EMERGENCY MEDICINE

## 2020-01-01 PROCEDURE — 83880 ASSAY OF NATRIURETIC PEPTIDE: CPT | Performed by: EMERGENCY MEDICINE

## 2020-01-01 PROCEDURE — 25010000002 MORPHINE SULFATE (PF) 2 MG/ML SOLUTION: Performed by: HOSPITALIST

## 2020-01-01 PROCEDURE — 80048 BASIC METABOLIC PNL TOTAL CA: CPT | Performed by: NURSE PRACTITIONER

## 2020-01-01 PROCEDURE — 84484 ASSAY OF TROPONIN QUANT: CPT | Performed by: INTERNAL MEDICINE

## 2020-01-01 PROCEDURE — 25010000002 VANCOMYCIN 10 G RECONSTITUTED SOLUTION: Performed by: EMERGENCY MEDICINE

## 2020-01-01 PROCEDURE — 80162 ASSAY OF DIGOXIN TOTAL: CPT | Performed by: PHYSICIAN ASSISTANT

## 2020-01-01 PROCEDURE — 33225 L VENTRIC PACING LEAD ADD-ON: CPT | Performed by: INTERNAL MEDICINE

## 2020-01-01 PROCEDURE — 83090 ASSAY OF HOMOCYSTEINE: CPT

## 2020-01-01 PROCEDURE — 74230 X-RAY XM SWLNG FUNCJ C+: CPT

## 2020-01-01 PROCEDURE — 97162 PT EVAL MOD COMPLEX 30 MIN: CPT

## 2020-01-01 PROCEDURE — 25010000002 VANCOMYCIN: Performed by: PHYSICIAN ASSISTANT

## 2020-01-01 PROCEDURE — 84443 ASSAY THYROID STIM HORMONE: CPT | Performed by: INTERNAL MEDICINE

## 2020-01-01 PROCEDURE — 99024 POSTOP FOLLOW-UP VISIT: CPT | Performed by: NURSE PRACTITIONER

## 2020-01-01 PROCEDURE — 0 IOPAMIDOL PER 1 ML: Performed by: INTERNAL MEDICINE

## 2020-01-01 PROCEDURE — 85610 PROTHROMBIN TIME: CPT | Performed by: EMERGENCY MEDICINE

## 2020-01-01 PROCEDURE — 71046 X-RAY EXAM CHEST 2 VIEWS: CPT

## 2020-01-01 PROCEDURE — 25010000002 FENTANYL CITRATE (PF) 100 MCG/2ML SOLUTION: Performed by: INTERNAL MEDICINE

## 2020-01-01 PROCEDURE — 83880 ASSAY OF NATRIURETIC PEPTIDE: CPT | Performed by: NURSE PRACTITIONER

## 2020-01-01 PROCEDURE — 83921 ORGANIC ACID SINGLE QUANT: CPT

## 2020-01-01 PROCEDURE — 99233 SBSQ HOSP IP/OBS HIGH 50: CPT | Performed by: INTERNAL MEDICINE

## 2020-01-01 PROCEDURE — 92611 MOTION FLUOROSCOPY/SWALLOW: CPT

## 2020-01-01 PROCEDURE — 82805 BLOOD GASES W/O2 SATURATION: CPT

## 2020-01-01 DEVICE — LD QUARTET CRT VENT LNG SPACING 86CM: Type: IMPLANTABLE DEVICE | Status: FUNCTIONAL

## 2020-01-01 DEVICE — LD PM TENDRIL STS 6F58CM 2088TC58: Type: IMPLANTABLE DEVICE | Status: FUNCTIONAL

## 2020-01-01 DEVICE — IMPLANTABLE DEVICE: Type: IMPLANTABLE DEVICE | Status: FUNCTIONAL

## 2020-01-01 RX ORDER — POTASSIUM CHLORIDE 750 MG/1
20 CAPSULE, EXTENDED RELEASE ORAL DAILY
Status: DISCONTINUED | OUTPATIENT
Start: 2020-01-01 | End: 2020-01-01 | Stop reason: HOSPADM

## 2020-01-01 RX ORDER — SODIUM CHLORIDE 0.9 % (FLUSH) 0.9 %
10 SYRINGE (ML) INJECTION AS NEEDED
Status: DISCONTINUED | OUTPATIENT
Start: 2020-01-01 | End: 2020-01-01 | Stop reason: HOSPADM

## 2020-01-01 RX ORDER — NITROGLYCERIN 0.4 MG/1
0.4 TABLET SUBLINGUAL AS NEEDED
Status: DISCONTINUED | OUTPATIENT
Start: 2020-01-01 | End: 2020-01-01 | Stop reason: HOSPADM

## 2020-01-01 RX ORDER — POTASSIUM CHLORIDE 1500 MG/1
20 TABLET, FILM COATED, EXTENDED RELEASE ORAL DAILY PRN
Qty: 30 TABLET | Refills: 5 | Status: SHIPPED | OUTPATIENT
Start: 2020-01-01 | End: 2020-01-01 | Stop reason: SDUPTHER

## 2020-01-01 RX ORDER — ALLOPURINOL 100 MG/1
100 TABLET ORAL DAILY
Status: DISCONTINUED | OUTPATIENT
Start: 2020-01-01 | End: 2020-01-01 | Stop reason: HOSPADM

## 2020-01-01 RX ORDER — POTASSIUM CHLORIDE 750 MG/1
40 CAPSULE, EXTENDED RELEASE ORAL AS NEEDED
Status: DISCONTINUED | OUTPATIENT
Start: 2020-01-01 | End: 2020-01-01 | Stop reason: HOSPADM

## 2020-01-01 RX ORDER — CITALOPRAM 20 MG/1
10 TABLET ORAL DAILY
Status: DISCONTINUED | OUTPATIENT
Start: 2020-01-01 | End: 2020-01-01 | Stop reason: HOSPADM

## 2020-01-01 RX ORDER — SODIUM CHLORIDE 0.9 % (FLUSH) 0.9 %
10 SYRINGE (ML) INJECTION AS NEEDED
Status: DISCONTINUED | OUTPATIENT
Start: 2020-01-01 | End: 2020-01-01

## 2020-01-01 RX ORDER — FUROSEMIDE 10 MG/ML
20 INJECTION INTRAMUSCULAR; INTRAVENOUS EVERY 12 HOURS
Status: DISCONTINUED | OUTPATIENT
Start: 2020-01-01 | End: 2020-01-01

## 2020-01-01 RX ORDER — POTASSIUM CHLORIDE 7.45 MG/ML
10 INJECTION INTRAVENOUS
Status: DISCONTINUED | OUTPATIENT
Start: 2020-01-01 | End: 2020-01-01 | Stop reason: HOSPADM

## 2020-01-01 RX ORDER — OXYCODONE AND ACETAMINOPHEN 7.5; 325 MG/1; MG/1
1 TABLET ORAL EVERY 6 HOURS PRN
Status: DISCONTINUED | OUTPATIENT
Start: 2020-01-01 | End: 2020-01-01

## 2020-01-01 RX ORDER — FUROSEMIDE 40 MG/1
40 TABLET ORAL 2 TIMES DAILY
Start: 2020-01-01 | End: 2020-01-01 | Stop reason: SDUPTHER

## 2020-01-01 RX ORDER — ONDANSETRON 4 MG/1
4 TABLET, FILM COATED ORAL EVERY 6 HOURS PRN
Status: DISCONTINUED | OUTPATIENT
Start: 2020-01-01 | End: 2020-01-01 | Stop reason: HOSPADM

## 2020-01-01 RX ORDER — IPRATROPIUM BROMIDE AND ALBUTEROL SULFATE 2.5; .5 MG/3ML; MG/3ML
3 SOLUTION RESPIRATORY (INHALATION)
Status: DISCONTINUED | OUTPATIENT
Start: 2020-01-01 | End: 2020-01-01

## 2020-01-01 RX ORDER — AMIODARONE HYDROCHLORIDE 200 MG/1
200 TABLET ORAL 2 TIMES DAILY
Status: DISCONTINUED | OUTPATIENT
Start: 2020-01-01 | End: 2020-01-01 | Stop reason: HOSPADM

## 2020-01-01 RX ORDER — HYDROCHLOROTHIAZIDE 25 MG/1
25 TABLET ORAL AS NEEDED
COMMUNITY
End: 2020-01-01

## 2020-01-01 RX ORDER — ALLOPURINOL 300 MG/1
300 TABLET ORAL DAILY
Status: DISCONTINUED | OUTPATIENT
Start: 2020-01-01 | End: 2020-01-01 | Stop reason: HOSPADM

## 2020-01-01 RX ORDER — LIDOCAINE HYDROCHLORIDE 10 MG/ML
INJECTION, SOLUTION EPIDURAL; INFILTRATION; INTRACAUDAL; PERINEURAL AS NEEDED
Status: DISCONTINUED | OUTPATIENT
Start: 2020-01-01 | End: 2020-01-01 | Stop reason: HOSPADM

## 2020-01-01 RX ORDER — BISACODYL 5 MG/1
5 TABLET, DELAYED RELEASE ORAL DAILY PRN
Status: DISCONTINUED | OUTPATIENT
Start: 2020-01-01 | End: 2020-01-01 | Stop reason: HOSPADM

## 2020-01-01 RX ORDER — FERROUS SULFATE 325(65) MG
325 TABLET ORAL
Status: ON HOLD | COMMUNITY
End: 2020-01-01

## 2020-01-01 RX ORDER — POTASSIUM CHLORIDE 1.5 G/1.77G
40 POWDER, FOR SOLUTION ORAL AS NEEDED
Status: DISCONTINUED | OUTPATIENT
Start: 2020-01-01 | End: 2020-01-01 | Stop reason: HOSPADM

## 2020-01-01 RX ORDER — SODIUM CHLORIDE 0.9 % (FLUSH) 0.9 %
10 SYRINGE (ML) INJECTION EVERY 12 HOURS SCHEDULED
Status: DISCONTINUED | OUTPATIENT
Start: 2020-01-01 | End: 2020-01-01 | Stop reason: HOSPADM

## 2020-01-01 RX ORDER — ASPIRIN 81 MG/1
81 TABLET, CHEWABLE ORAL DAILY
Status: DISCONTINUED | OUTPATIENT
Start: 2020-01-01 | End: 2020-01-01 | Stop reason: HOSPADM

## 2020-01-01 RX ORDER — FERROUS SULFATE 325(65) MG
325 TABLET ORAL
Status: DISCONTINUED | OUTPATIENT
Start: 2020-01-01 | End: 2020-01-01 | Stop reason: HOSPADM

## 2020-01-01 RX ORDER — MAGNESIUM SULFATE HEPTAHYDRATE 40 MG/ML
2 INJECTION, SOLUTION INTRAVENOUS AS NEEDED
Status: DISCONTINUED | OUTPATIENT
Start: 2020-01-01 | End: 2020-01-01 | Stop reason: HOSPADM

## 2020-01-01 RX ORDER — NICOTINE POLACRILEX 4 MG
15 LOZENGE BUCCAL
Status: DISCONTINUED | OUTPATIENT
Start: 2020-01-01 | End: 2020-01-01 | Stop reason: HOSPADM

## 2020-01-01 RX ORDER — CITALOPRAM 20 MG/1
TABLET ORAL
Qty: 90 TABLET | Refills: 0 | Status: SHIPPED | OUTPATIENT
Start: 2020-01-01 | End: 2020-01-01 | Stop reason: SDUPTHER

## 2020-01-01 RX ORDER — UBIDECARENONE 100 MG
100 CAPSULE ORAL DAILY
COMMUNITY

## 2020-01-01 RX ORDER — UBIDECARENONE 75 MG
100 CAPSULE ORAL DAILY
Status: DISCONTINUED | OUTPATIENT
Start: 2020-01-01 | End: 2020-01-01 | Stop reason: HOSPADM

## 2020-01-01 RX ORDER — SODIUM CHLORIDE 9 MG/ML
50 INJECTION, SOLUTION INTRAVENOUS CONTINUOUS
Status: DISCONTINUED | OUTPATIENT
Start: 2020-01-01 | End: 2020-01-01

## 2020-01-01 RX ORDER — SIMETHICONE 125 MG
125 TABLET,CHEWABLE ORAL EVERY 6 HOURS PRN
Status: DISCONTINUED | OUTPATIENT
Start: 2020-01-01 | End: 2020-01-01 | Stop reason: HOSPADM

## 2020-01-01 RX ORDER — FUROSEMIDE 40 MG/1
40 TABLET ORAL DAILY
Start: 2020-01-01 | End: 2020-01-01 | Stop reason: SDUPTHER

## 2020-01-01 RX ORDER — CARVEDILOL 3.12 MG/1
3.12 TABLET ORAL 2 TIMES DAILY WITH MEALS
Status: DISCONTINUED | OUTPATIENT
Start: 2020-01-01 | End: 2020-01-01 | Stop reason: HOSPADM

## 2020-01-01 RX ORDER — LEVOTHYROXINE SODIUM 0.12 MG/1
125 TABLET ORAL DAILY
Qty: 90 TABLET | Refills: 1 | Status: SHIPPED | OUTPATIENT
Start: 2020-01-01

## 2020-01-01 RX ORDER — CARVEDILOL 3.12 MG/1
3.12 TABLET ORAL 2 TIMES DAILY WITH MEALS
Qty: 60 TABLET | Refills: 11 | Status: SHIPPED | OUTPATIENT
Start: 2020-01-01

## 2020-01-01 RX ORDER — SODIUM CHLORIDE AND POTASSIUM CHLORIDE 300; 900 MG/100ML; MG/100ML
200 INJECTION, SOLUTION INTRAVENOUS CONTINUOUS
Status: DISCONTINUED | OUTPATIENT
Start: 2020-01-01 | End: 2020-01-01 | Stop reason: HOSPADM

## 2020-01-01 RX ORDER — HEPARIN SODIUM 5000 [USP'U]/ML
5000 INJECTION, SOLUTION INTRAVENOUS; SUBCUTANEOUS EVERY 8 HOURS SCHEDULED
Status: DISCONTINUED | OUTPATIENT
Start: 2020-01-01 | End: 2020-01-01 | Stop reason: HOSPADM

## 2020-01-01 RX ORDER — SIMETHICONE 125 MG
125 TABLET,CHEWABLE ORAL EVERY 6 HOURS PRN
COMMUNITY

## 2020-01-01 RX ORDER — OXYCODONE AND ACETAMINOPHEN 10; 325 MG/1; MG/1
1 TABLET ORAL EVERY 8 HOURS PRN
Status: DISCONTINUED | OUTPATIENT
Start: 2020-01-01 | End: 2020-01-01 | Stop reason: HOSPADM

## 2020-01-01 RX ORDER — ONDANSETRON 2 MG/ML
4 INJECTION INTRAMUSCULAR; INTRAVENOUS EVERY 6 HOURS PRN
Status: DISCONTINUED | OUTPATIENT
Start: 2020-01-01 | End: 2020-01-01 | Stop reason: HOSPADM

## 2020-01-01 RX ORDER — METOPROLOL TARTRATE 5 MG/5ML
INJECTION INTRAVENOUS AS NEEDED
Status: DISCONTINUED | OUTPATIENT
Start: 2020-01-01 | End: 2020-01-01 | Stop reason: HOSPADM

## 2020-01-01 RX ORDER — THIAMINE MONONITRATE (VIT B1) 100 MG
50 TABLET ORAL DAILY
Status: DISCONTINUED | OUTPATIENT
Start: 2020-01-01 | End: 2020-01-01 | Stop reason: HOSPADM

## 2020-01-01 RX ORDER — FUROSEMIDE 40 MG/1
TABLET ORAL
Start: 2020-01-01

## 2020-01-01 RX ORDER — MORPHINE SULFATE 2 MG/ML
INJECTION, SOLUTION INTRAMUSCULAR; INTRAVENOUS
Status: COMPLETED | OUTPATIENT
Start: 2020-01-01 | End: 2020-01-01

## 2020-01-01 RX ORDER — ACETAMINOPHEN 325 MG/1
650 TABLET ORAL EVERY 4 HOURS PRN
Status: DISCONTINUED | OUTPATIENT
Start: 2020-01-01 | End: 2020-01-01 | Stop reason: HOSPADM

## 2020-01-01 RX ORDER — GABAPENTIN 600 MG/1
600 TABLET ORAL 2 TIMES DAILY
OUTPATIENT
Start: 2020-01-01

## 2020-01-01 RX ORDER — OXYCODONE HYDROCHLORIDE AND ACETAMINOPHEN 5; 325 MG/1; MG/1
0.5 TABLET ORAL EVERY 6 HOURS PRN
Status: DISCONTINUED | OUTPATIENT
Start: 2020-01-01 | End: 2020-01-01 | Stop reason: HOSPADM

## 2020-01-01 RX ORDER — MAGNESIUM SULFATE HEPTAHYDRATE 500 MG/ML
INJECTION, SOLUTION INTRAMUSCULAR; INTRAVENOUS
Status: COMPLETED | OUTPATIENT
Start: 2020-01-01 | End: 2020-01-01

## 2020-01-01 RX ORDER — MAGNESIUM SULFATE HEPTAHYDRATE 40 MG/ML
4 INJECTION, SOLUTION INTRAVENOUS AS NEEDED
Status: DISCONTINUED | OUTPATIENT
Start: 2020-01-01 | End: 2020-01-01 | Stop reason: HOSPADM

## 2020-01-01 RX ORDER — DEXTROSE MONOHYDRATE 25 G/50ML
25 INJECTION, SOLUTION INTRAVENOUS
Status: DISCONTINUED | OUTPATIENT
Start: 2020-01-01 | End: 2020-01-01 | Stop reason: HOSPADM

## 2020-01-01 RX ORDER — ACETAMINOPHEN 650 MG/1
650 SUPPOSITORY RECTAL EVERY 4 HOURS PRN
Status: DISCONTINUED | OUTPATIENT
Start: 2020-01-01 | End: 2020-01-01 | Stop reason: HOSPADM

## 2020-01-01 RX ORDER — GABAPENTIN 300 MG/1
600 CAPSULE ORAL EVERY 8 HOURS SCHEDULED
Status: DISCONTINUED | OUTPATIENT
Start: 2020-01-01 | End: 2020-01-01 | Stop reason: HOSPADM

## 2020-01-01 RX ORDER — UBIDECARENONE 75 MG
100 CAPSULE ORAL DAILY
COMMUNITY

## 2020-01-01 RX ORDER — ERGOCALCIFEROL (VITAMIN D2) 10 MCG
400 TABLET ORAL DAILY
COMMUNITY

## 2020-01-01 RX ORDER — ACETAMINOPHEN 325 MG/1
650 TABLET ORAL EVERY 6 HOURS PRN
Status: DISCONTINUED | OUTPATIENT
Start: 2020-01-01 | End: 2020-01-01 | Stop reason: HOSPADM

## 2020-01-01 RX ORDER — GABAPENTIN 300 MG/1
300 CAPSULE ORAL EVERY 12 HOURS SCHEDULED
Status: DISCONTINUED | OUTPATIENT
Start: 2020-01-01 | End: 2020-01-01 | Stop reason: HOSPADM

## 2020-01-01 RX ORDER — DOCUSATE SODIUM 100 MG/1
100 CAPSULE, LIQUID FILLED ORAL 2 TIMES DAILY PRN
Status: DISCONTINUED | OUTPATIENT
Start: 2020-01-01 | End: 2020-01-01 | Stop reason: HOSPADM

## 2020-01-01 RX ORDER — AMIODARONE HYDROCHLORIDE 200 MG/1
200 TABLET ORAL 2 TIMES DAILY
Qty: 60 TABLET | Refills: 11 | Status: SHIPPED | OUTPATIENT
Start: 2020-01-01

## 2020-01-01 RX ORDER — ROSUVASTATIN CALCIUM 40 MG/1
40 TABLET, COATED ORAL NIGHTLY
Qty: 30 TABLET | Refills: 5 | Status: SHIPPED | OUTPATIENT
Start: 2020-01-01

## 2020-01-01 RX ORDER — TAMSULOSIN HYDROCHLORIDE 0.4 MG/1
0.4 CAPSULE ORAL NIGHTLY
Status: DISCONTINUED | OUTPATIENT
Start: 2020-01-01 | End: 2020-01-01 | Stop reason: HOSPADM

## 2020-01-01 RX ORDER — PANTOPRAZOLE SODIUM 40 MG/1
40 TABLET, DELAYED RELEASE ORAL EVERY MORNING
Status: DISCONTINUED | OUTPATIENT
Start: 2020-03-22 | End: 2020-01-01 | Stop reason: HOSPADM

## 2020-01-01 RX ORDER — CALCIUM CARBONATE 750 MG/1
750 TABLET, CHEWABLE ORAL 3 TIMES DAILY PRN
Status: DISCONTINUED | OUTPATIENT
Start: 2020-01-01 | End: 2020-01-01 | Stop reason: HOSPADM

## 2020-01-01 RX ORDER — LEVOTHYROXINE SODIUM 112 UG/1
112 TABLET ORAL
Status: DISCONTINUED | OUTPATIENT
Start: 2020-01-01 | End: 2020-01-01 | Stop reason: HOSPADM

## 2020-01-01 RX ORDER — CITALOPRAM 20 MG/1
20 TABLET ORAL DAILY
Status: DISCONTINUED | OUTPATIENT
Start: 2020-01-01 | End: 2020-01-01 | Stop reason: HOSPADM

## 2020-01-01 RX ORDER — FUROSEMIDE 10 MG/ML
40 INJECTION INTRAMUSCULAR; INTRAVENOUS ONCE
Status: COMPLETED | OUTPATIENT
Start: 2020-01-01 | End: 2020-01-01

## 2020-01-01 RX ORDER — LEVOTHYROXINE SODIUM 112 UG/1
112 TABLET ORAL DAILY
Qty: 30 TABLET | Refills: 1 | Status: SHIPPED | OUTPATIENT
Start: 2020-01-01 | End: 2020-01-01 | Stop reason: SDUPTHER

## 2020-01-01 RX ORDER — IPRATROPIUM BROMIDE AND ALBUTEROL SULFATE 2.5; .5 MG/3ML; MG/3ML
3 SOLUTION RESPIRATORY (INHALATION) EVERY 4 HOURS PRN
Status: DISCONTINUED | OUTPATIENT
Start: 2020-01-01 | End: 2020-01-01 | Stop reason: HOSPADM

## 2020-01-01 RX ORDER — FUROSEMIDE 40 MG/1
40 TABLET ORAL DAILY
Status: DISCONTINUED | OUTPATIENT
Start: 2020-01-01 | End: 2020-01-01 | Stop reason: HOSPADM

## 2020-01-01 RX ORDER — ROSUVASTATIN CALCIUM 20 MG/1
40 TABLET, COATED ORAL NIGHTLY
Status: DISCONTINUED | OUTPATIENT
Start: 2020-01-01 | End: 2020-01-01 | Stop reason: HOSPADM

## 2020-01-01 RX ORDER — DOXYCYCLINE 100 MG/1
100 CAPSULE ORAL EVERY 12 HOURS SCHEDULED
Qty: 6 CAPSULE | Refills: 0 | Status: SHIPPED | OUTPATIENT
Start: 2020-01-01 | End: 2020-01-01

## 2020-01-01 RX ORDER — IPRATROPIUM BROMIDE AND ALBUTEROL SULFATE 2.5; .5 MG/3ML; MG/3ML
3 SOLUTION RESPIRATORY (INHALATION) EVERY 6 HOURS PRN
Status: DISCONTINUED | OUTPATIENT
Start: 2020-01-01 | End: 2020-01-01

## 2020-01-01 RX ORDER — TAMSULOSIN HYDROCHLORIDE 0.4 MG/1
1 CAPSULE ORAL NIGHTLY
Qty: 90 CAPSULE | Refills: 3 | Status: SHIPPED | OUTPATIENT
Start: 2020-01-01

## 2020-01-01 RX ORDER — ACETAMINOPHEN 160 MG/5ML
650 SOLUTION ORAL EVERY 4 HOURS PRN
Status: DISCONTINUED | OUTPATIENT
Start: 2020-01-01 | End: 2020-01-01 | Stop reason: HOSPADM

## 2020-01-01 RX ORDER — SODIUM CHLORIDE 9 MG/ML
75 INJECTION, SOLUTION INTRAVENOUS CONTINUOUS
Status: DISCONTINUED | OUTPATIENT
Start: 2020-01-01 | End: 2020-01-01

## 2020-01-01 RX ORDER — PANTOPRAZOLE SODIUM 40 MG/1
40 TABLET, DELAYED RELEASE ORAL EVERY MORNING
Status: DISCONTINUED | OUTPATIENT
Start: 2020-01-01 | End: 2020-01-01 | Stop reason: HOSPADM

## 2020-01-01 RX ORDER — DILTIAZEM HCL IN NACL,ISO-OSM 125 MG/125
5-15 PLASTIC BAG, INJECTION (ML) INTRAVENOUS CONTINUOUS
Status: DISCONTINUED | OUTPATIENT
Start: 2020-01-01 | End: 2020-01-01

## 2020-01-01 RX ORDER — FERROUS SULFATE 325(65) MG
325 TABLET ORAL
Qty: 30 TABLET | Refills: 0 | Status: SHIPPED | OUTPATIENT
Start: 2020-01-01 | End: 2020-01-01

## 2020-01-01 RX ORDER — CHOLECALCIFEROL (VITAMIN D3) 125 MCG
5 CAPSULE ORAL NIGHTLY PRN
Status: DISCONTINUED | OUTPATIENT
Start: 2020-01-01 | End: 2020-01-01 | Stop reason: HOSPADM

## 2020-01-01 RX ORDER — MORPHINE SULFATE 2 MG/ML
INJECTION, SOLUTION INTRAMUSCULAR; INTRAVENOUS
Status: DISCONTINUED
Start: 2020-01-01 | End: 2020-01-01 | Stop reason: HOSPADM

## 2020-01-01 RX ORDER — ROSUVASTATIN CALCIUM 40 MG/1
40 TABLET, COATED ORAL NIGHTLY
COMMUNITY
End: 2020-01-01 | Stop reason: SDUPTHER

## 2020-01-01 RX ORDER — CASTOR OIL AND BALSAM, PERU 788; 87 MG/G; MG/G
OINTMENT TOPICAL EVERY 12 HOURS SCHEDULED
Status: DISCONTINUED | OUTPATIENT
Start: 2020-01-01 | End: 2020-01-01 | Stop reason: HOSPADM

## 2020-01-01 RX ORDER — CIPROFLOXACIN 500 MG/1
500 TABLET, FILM COATED ORAL 2 TIMES DAILY
Qty: 14 TABLET | Refills: 0 | Status: SHIPPED | OUTPATIENT
Start: 2020-01-01 | End: 2020-01-01

## 2020-01-01 RX ORDER — AMIODARONE HYDROCHLORIDE 50 MG/ML
INJECTION, SOLUTION INTRAVENOUS
Status: COMPLETED | OUTPATIENT
Start: 2020-01-01 | End: 2020-01-01

## 2020-01-01 RX ORDER — MIDAZOLAM HYDROCHLORIDE 1 MG/ML
INJECTION INTRAMUSCULAR; INTRAVENOUS AS NEEDED
Status: DISCONTINUED | OUTPATIENT
Start: 2020-01-01 | End: 2020-01-01 | Stop reason: HOSPADM

## 2020-01-01 RX ORDER — MORPHINE SULFATE 15 MG/1
15 TABLET, FILM COATED, EXTENDED RELEASE ORAL EVERY 12 HOURS SCHEDULED
Status: DISCONTINUED | OUTPATIENT
Start: 2020-01-01 | End: 2020-01-01

## 2020-01-01 RX ORDER — CITALOPRAM 20 MG/1
20 TABLET ORAL DAILY
Qty: 90 TABLET | Refills: 0 | Status: SHIPPED | OUTPATIENT
Start: 2020-01-01

## 2020-01-01 RX ORDER — AMIODARONE HYDROCHLORIDE 50 MG/ML
INJECTION, SOLUTION INTRAVENOUS AS NEEDED
Status: DISCONTINUED | OUTPATIENT
Start: 2020-01-01 | End: 2020-01-01 | Stop reason: HOSPADM

## 2020-01-01 RX ORDER — GABAPENTIN 300 MG/1
600 CAPSULE ORAL EVERY 12 HOURS SCHEDULED
Status: DISCONTINUED | OUTPATIENT
Start: 2020-01-01 | End: 2020-01-01 | Stop reason: HOSPADM

## 2020-01-01 RX ORDER — OMEPRAZOLE 40 MG/1
CAPSULE, DELAYED RELEASE ORAL
Qty: 90 CAPSULE | Refills: 1 | Status: SHIPPED | OUTPATIENT
Start: 2020-01-01

## 2020-01-01 RX ORDER — PANTOPRAZOLE SODIUM 40 MG/1
40 TABLET, DELAYED RELEASE ORAL DAILY
Status: DISCONTINUED | OUTPATIENT
Start: 2020-01-01 | End: 2020-01-01 | Stop reason: HOSPADM

## 2020-01-01 RX ORDER — OXYCODONE AND ACETAMINOPHEN 10; 325 MG/1; MG/1
1 TABLET ORAL EVERY 6 HOURS PRN
Status: DISCONTINUED | OUTPATIENT
Start: 2020-01-01 | End: 2020-01-01 | Stop reason: HOSPADM

## 2020-01-01 RX ORDER — POTASSIUM CHLORIDE 1500 MG/1
20 TABLET, FILM COATED, EXTENDED RELEASE ORAL DAILY
Qty: 30 TABLET | Refills: 5
Start: 2020-01-01

## 2020-01-01 RX ORDER — POTASSIUM CHLORIDE 750 MG/1
20 CAPSULE, EXTENDED RELEASE ORAL 2 TIMES DAILY WITH MEALS
Status: DISCONTINUED | OUTPATIENT
Start: 2020-01-01 | End: 2020-01-01 | Stop reason: HOSPADM

## 2020-01-01 RX ORDER — DILTIAZEM HYDROCHLORIDE 5 MG/ML
10 INJECTION INTRAVENOUS ONCE
Status: COMPLETED | OUTPATIENT
Start: 2020-01-01 | End: 2020-01-01

## 2020-01-01 RX ORDER — THIAMINE HCL 50 MG
50 TABLET ORAL DAILY
COMMUNITY

## 2020-01-01 RX ORDER — LEVOTHYROXINE SODIUM 0.1 MG/1
100 TABLET ORAL DAILY
Status: DISCONTINUED | OUTPATIENT
Start: 2020-01-01 | End: 2020-01-01 | Stop reason: HOSPADM

## 2020-01-01 RX ORDER — LEVOTHYROXINE SODIUM 0.12 MG/1
125 TABLET ORAL
Status: DISCONTINUED | OUTPATIENT
Start: 2020-03-22 | End: 2020-01-01 | Stop reason: HOSPADM

## 2020-01-01 RX ORDER — AMIODARONE HYDROCHLORIDE 200 MG/1
200 TABLET ORAL EVERY 8 HOURS SCHEDULED
Status: DISCONTINUED | OUTPATIENT
Start: 2020-01-01 | End: 2020-01-01 | Stop reason: HOSPADM

## 2020-01-01 RX ORDER — LEVOTHYROXINE SODIUM 0.1 MG/1
100 TABLET ORAL
Status: DISCONTINUED | OUTPATIENT
Start: 2020-01-01 | End: 2020-01-01 | Stop reason: HOSPADM

## 2020-01-01 RX ORDER — DOXYCYCLINE 100 MG/1
100 CAPSULE ORAL EVERY 12 HOURS SCHEDULED
Status: DISCONTINUED | OUTPATIENT
Start: 2020-01-01 | End: 2020-01-01 | Stop reason: HOSPADM

## 2020-01-01 RX ORDER — CITALOPRAM 20 MG/1
20 TABLET ORAL DAILY
Status: DISCONTINUED | OUTPATIENT
Start: 2020-01-01 | End: 2020-01-01

## 2020-01-01 RX ORDER — DIGOXIN 0.25 MG/ML
250 INJECTION INTRAMUSCULAR; INTRAVENOUS ONCE
Status: COMPLETED | OUTPATIENT
Start: 2020-01-01 | End: 2020-01-01

## 2020-01-01 RX ORDER — NALOXONE HCL 0.4 MG/ML
1 VIAL (ML) INJECTION ONCE
Status: COMPLETED | OUTPATIENT
Start: 2020-01-01 | End: 2020-01-01

## 2020-01-01 RX ORDER — FENTANYL CITRATE 50 UG/ML
INJECTION, SOLUTION INTRAMUSCULAR; INTRAVENOUS AS NEEDED
Status: DISCONTINUED | OUTPATIENT
Start: 2020-01-01 | End: 2020-01-01 | Stop reason: HOSPADM

## 2020-01-01 RX ADMIN — ALLOPURINOL 100 MG: 100 TABLET ORAL at 09:04

## 2020-01-01 RX ADMIN — AMIODARONE HYDROCHLORIDE 200 MG: 200 TABLET ORAL at 06:05

## 2020-01-01 RX ADMIN — ALLOPURINOL 100 MG: 100 TABLET ORAL at 08:39

## 2020-01-01 RX ADMIN — CITALOPRAM HYDROBROMIDE 20 MG: 20 TABLET ORAL at 09:04

## 2020-01-01 RX ADMIN — IPRATROPIUM BROMIDE AND ALBUTEROL SULFATE 3 ML: 2.5; .5 SOLUTION RESPIRATORY (INHALATION) at 15:35

## 2020-01-01 RX ADMIN — FUROSEMIDE 40 MG: 40 TABLET ORAL at 09:04

## 2020-01-01 RX ADMIN — CARVEDILOL 3.12 MG: 3.12 TABLET, FILM COATED ORAL at 22:37

## 2020-01-01 RX ADMIN — OXYCODONE HYDROCHLORIDE AND ACETAMINOPHEN 1 TABLET: 10; 325 TABLET ORAL at 14:24

## 2020-01-01 RX ADMIN — POTASSIUM CHLORIDE 40 MEQ: 750 CAPSULE, EXTENDED RELEASE ORAL at 17:18

## 2020-01-01 RX ADMIN — GABAPENTIN 600 MG: 300 CAPSULE ORAL at 16:53

## 2020-01-01 RX ADMIN — ROSUVASTATIN CALCIUM 40 MG: 20 TABLET, COATED ORAL at 21:23

## 2020-01-01 RX ADMIN — AMIODARONE HYDROCHLORIDE 200 MG: 200 TABLET ORAL at 20:02

## 2020-01-01 RX ADMIN — CITALOPRAM HYDROBROMIDE 10 MG: 20 TABLET ORAL at 09:19

## 2020-01-01 RX ADMIN — LEVOTHYROXINE SODIUM 100 MCG: 100 TABLET ORAL at 06:19

## 2020-01-01 RX ADMIN — DOXYCYCLINE 100 MG: 100 CAPSULE ORAL at 09:16

## 2020-01-01 RX ADMIN — OXYCODONE HYDROCHLORIDE AND ACETAMINOPHEN 1 TABLET: 10; 325 TABLET ORAL at 05:12

## 2020-01-01 RX ADMIN — ASPIRIN 81 MG 81 MG: 81 TABLET ORAL at 08:43

## 2020-01-01 RX ADMIN — OXYCODONE HYDROCHLORIDE AND ACETAMINOPHEN 0.5 TABLET: 5; 325 TABLET ORAL at 04:16

## 2020-01-01 RX ADMIN — CITALOPRAM HYDROBROMIDE 10 MG: 20 TABLET ORAL at 09:16

## 2020-01-01 RX ADMIN — CARVEDILOL 3.12 MG: 3.12 TABLET, FILM COATED ORAL at 09:16

## 2020-01-01 RX ADMIN — TAZOBACTAM SODIUM AND PIPERACILLIN SODIUM 3.38 G: 375; 3 INJECTION, SOLUTION INTRAVENOUS at 04:30

## 2020-01-01 RX ADMIN — AMIODARONE HYDROCHLORIDE 200 MG: 200 TABLET ORAL at 21:41

## 2020-01-01 RX ADMIN — FUROSEMIDE 20 MG: 10 INJECTION, SOLUTION INTRAMUSCULAR; INTRAVENOUS at 05:34

## 2020-01-01 RX ADMIN — IPRATROPIUM BROMIDE AND ALBUTEROL SULFATE 3 ML: 2.5; .5 SOLUTION RESPIRATORY (INHALATION) at 12:35

## 2020-01-01 RX ADMIN — ASPIRIN 81 MG 81 MG: 81 TABLET ORAL at 09:14

## 2020-01-01 RX ADMIN — ASPIRIN 81 MG 81 MG: 81 TABLET ORAL at 08:39

## 2020-01-01 RX ADMIN — IPRATROPIUM BROMIDE AND ALBUTEROL SULFATE 3 ML: 2.5; .5 SOLUTION RESPIRATORY (INHALATION) at 19:17

## 2020-01-01 RX ADMIN — EPINEPHRINE 1 MG: 0.1 INJECTION, SOLUTION ENDOTRACHEAL; INTRACARDIAC; INTRAVENOUS at 16:34

## 2020-01-01 RX ADMIN — GABAPENTIN 600 MG: 300 CAPSULE ORAL at 20:57

## 2020-01-01 RX ADMIN — LIDOCAINE HYDROCHLORIDE 100 MG: 20 INJECTION, SOLUTION INTRAVENOUS at 16:48

## 2020-01-01 RX ADMIN — ALLOPURINOL 100 MG: 100 TABLET ORAL at 09:17

## 2020-01-01 RX ADMIN — PANTOPRAZOLE SODIUM 40 MG: 40 TABLET, DELAYED RELEASE ORAL at 06:05

## 2020-01-01 RX ADMIN — SODIUM CHLORIDE, PRESERVATIVE FREE 10 ML: 5 INJECTION INTRAVENOUS at 20:59

## 2020-01-01 RX ADMIN — HEPARIN SODIUM 5000 UNITS: 5000 INJECTION INTRAVENOUS; SUBCUTANEOUS at 13:48

## 2020-01-01 RX ADMIN — CITALOPRAM HYDROBROMIDE 20 MG: 20 TABLET ORAL at 08:43

## 2020-01-01 RX ADMIN — EPINEPHRINE 1 MG: 0.1 INJECTION, SOLUTION ENDOTRACHEAL; INTRACARDIAC; INTRAVENOUS at 16:31

## 2020-01-01 RX ADMIN — CARVEDILOL 3.12 MG: 3.12 TABLET, FILM COATED ORAL at 09:25

## 2020-01-01 RX ADMIN — GABAPENTIN 600 MG: 300 CAPSULE ORAL at 14:24

## 2020-01-01 RX ADMIN — EPINEPHRINE 1 MG: 0.1 INJECTION, SOLUTION ENDOTRACHEAL; INTRACARDIAC; INTRAVENOUS at 16:50

## 2020-01-01 RX ADMIN — CASTOR OIL AND BALSAM, PERU: 788; 87 OINTMENT TOPICAL at 14:15

## 2020-01-01 RX ADMIN — HEPARIN SODIUM 5000 UNITS: 5000 INJECTION, SOLUTION INTRAVENOUS; SUBCUTANEOUS at 21:23

## 2020-01-01 RX ADMIN — GABAPENTIN 600 MG: 300 CAPSULE ORAL at 06:05

## 2020-01-01 RX ADMIN — OXYCODONE HYDROCHLORIDE AND ACETAMINOPHEN 0.5 TABLET: 5; 325 TABLET ORAL at 11:22

## 2020-01-01 RX ADMIN — TAMSULOSIN HYDROCHLORIDE 0.4 MG: 0.4 CAPSULE ORAL at 20:57

## 2020-01-01 RX ADMIN — GABAPENTIN 600 MG: 300 CAPSULE ORAL at 14:12

## 2020-01-01 RX ADMIN — LEVOTHYROXINE SODIUM 112 MCG: 112 TABLET ORAL at 06:01

## 2020-01-01 RX ADMIN — LEVOTHYROXINE SODIUM 100 MCG: 100 TABLET ORAL at 05:12

## 2020-01-01 RX ADMIN — TAZOBACTAM SODIUM AND PIPERACILLIN SODIUM 3.38 G: 375; 3 INJECTION, SOLUTION INTRAVENOUS at 22:39

## 2020-01-01 RX ADMIN — CARVEDILOL 3.12 MG: 3.12 TABLET, FILM COATED ORAL at 09:10

## 2020-01-01 RX ADMIN — ALLOPURINOL 100 MG: 100 TABLET ORAL at 09:15

## 2020-01-01 RX ADMIN — AMIODARONE HYDROCHLORIDE 300 MG: 50 INJECTION, SOLUTION INTRAVENOUS at 16:33

## 2020-01-01 RX ADMIN — PANTOPRAZOLE SODIUM 40 MG: 40 TABLET, DELAYED RELEASE ORAL at 06:01

## 2020-01-01 RX ADMIN — GABAPENTIN 600 MG: 300 CAPSULE ORAL at 21:35

## 2020-01-01 RX ADMIN — SODIUM CHLORIDE, PRESERVATIVE FREE 10 ML: 5 INJECTION INTRAVENOUS at 08:43

## 2020-01-01 RX ADMIN — BARIUM SULFATE 20 ML: 400 PASTE ORAL at 13:15

## 2020-01-01 RX ADMIN — TAZOBACTAM SODIUM AND PIPERACILLIN SODIUM 3.38 G: 375; 3 INJECTION, SOLUTION INTRAVENOUS at 05:35

## 2020-01-01 RX ADMIN — CITALOPRAM HYDROBROMIDE 20 MG: 20 TABLET ORAL at 09:10

## 2020-01-01 RX ADMIN — SODIUM CHLORIDE, PRESERVATIVE FREE 10 ML: 5 INJECTION INTRAVENOUS at 21:29

## 2020-01-01 RX ADMIN — ASPIRIN 81 MG 81 MG: 81 TABLET ORAL at 09:16

## 2020-01-01 RX ADMIN — VANCOMYCIN HYDROCHLORIDE 1750 MG: 1 INJECTION, POWDER, LYOPHILIZED, FOR SOLUTION INTRAVENOUS at 16:55

## 2020-01-01 RX ADMIN — HEPARIN SODIUM 5000 UNITS: 5000 INJECTION INTRAVENOUS; SUBCUTANEOUS at 05:34

## 2020-01-01 RX ADMIN — OXYCODONE HYDROCHLORIDE AND ACETAMINOPHEN 1 TABLET: 10; 325 TABLET ORAL at 03:00

## 2020-01-01 RX ADMIN — CARVEDILOL 3.12 MG: 3.12 TABLET, FILM COATED ORAL at 17:54

## 2020-01-01 RX ADMIN — VANCOMYCIN HYDROCHLORIDE 1750 MG: 10 INJECTION, POWDER, LYOPHILIZED, FOR SOLUTION INTRAVENOUS at 14:44

## 2020-01-01 RX ADMIN — SODIUM CHLORIDE 500 ML: 9 INJECTION, SOLUTION INTRAVENOUS at 13:54

## 2020-01-01 RX ADMIN — FUROSEMIDE 40 MG: 40 TABLET ORAL at 09:14

## 2020-01-01 RX ADMIN — OXYCODONE HYDROCHLORIDE AND ACETAMINOPHEN 0.5 TABLET: 5; 325 TABLET ORAL at 13:48

## 2020-01-01 RX ADMIN — CARVEDILOL 3.12 MG: 3.12 TABLET, FILM COATED ORAL at 21:35

## 2020-01-01 RX ADMIN — OXYCODONE HYDROCHLORIDE AND ACETAMINOPHEN 1 TABLET: 10; 325 TABLET ORAL at 15:30

## 2020-01-01 RX ADMIN — ALLOPURINOL 100 MG: 100 TABLET ORAL at 09:18

## 2020-01-01 RX ADMIN — HEPARIN SODIUM 5000 UNITS: 5000 INJECTION INTRAVENOUS; SUBCUTANEOUS at 22:37

## 2020-01-01 RX ADMIN — OXYCODONE HYDROCHLORIDE AND ACETAMINOPHEN 1 TABLET: 10; 325 TABLET ORAL at 12:34

## 2020-01-01 RX ADMIN — LEVOTHYROXINE SODIUM 100 MCG: 100 TABLET ORAL at 06:05

## 2020-01-01 RX ADMIN — CASTOR OIL AND BALSAM, PERU: 788; 87 OINTMENT TOPICAL at 21:28

## 2020-01-01 RX ADMIN — TAZOBACTAM SODIUM AND PIPERACILLIN SODIUM 3.38 G: 375; 3 INJECTION, SOLUTION INTRAVENOUS at 20:02

## 2020-01-01 RX ADMIN — DOXYCYCLINE 100 MG: 100 INJECTION, POWDER, LYOPHILIZED, FOR SOLUTION INTRAVENOUS at 20:04

## 2020-01-01 RX ADMIN — CALCIUM CARBONATE 750 MG: 750 TABLET ORAL at 20:01

## 2020-01-01 RX ADMIN — PANTOPRAZOLE SODIUM 40 MG: 40 TABLET, DELAYED RELEASE ORAL at 08:39

## 2020-01-01 RX ADMIN — GABAPENTIN 300 MG: 300 CAPSULE ORAL at 09:18

## 2020-01-01 RX ADMIN — HEPARIN SODIUM 5000 UNITS: 5000 INJECTION INTRAVENOUS; SUBCUTANEOUS at 04:29

## 2020-01-01 RX ADMIN — TAMSULOSIN HYDROCHLORIDE 0.4 MG: 0.4 CAPSULE ORAL at 21:28

## 2020-01-01 RX ADMIN — HEPARIN SODIUM 5000 UNITS: 5000 INJECTION, SOLUTION INTRAVENOUS; SUBCUTANEOUS at 06:01

## 2020-01-01 RX ADMIN — LEVOTHYROXINE SODIUM 112 MCG: 112 TABLET ORAL at 05:44

## 2020-01-01 RX ADMIN — OXYCODONE HYDROCHLORIDE AND ACETAMINOPHEN 1 TABLET: 10; 325 TABLET ORAL at 21:41

## 2020-01-01 RX ADMIN — ASPIRIN 81 MG 81 MG: 81 TABLET ORAL at 09:04

## 2020-01-01 RX ADMIN — GABAPENTIN 600 MG: 300 CAPSULE ORAL at 05:12

## 2020-01-01 RX ADMIN — POTASSIUM CHLORIDE 40 MEQ: 750 CAPSULE, EXTENDED RELEASE ORAL at 12:07

## 2020-01-01 RX ADMIN — GABAPENTIN 300 MG: 300 CAPSULE ORAL at 20:02

## 2020-01-01 RX ADMIN — AMIODARONE HYDROCHLORIDE 200 MG: 200 TABLET ORAL at 22:37

## 2020-01-01 RX ADMIN — HEPARIN SODIUM 5000 UNITS: 5000 INJECTION INTRAVENOUS; SUBCUTANEOUS at 20:03

## 2020-01-01 RX ADMIN — TAMSULOSIN HYDROCHLORIDE 0.4 MG: 0.4 CAPSULE ORAL at 20:03

## 2020-01-01 RX ADMIN — TAMSULOSIN HYDROCHLORIDE 0.4 MG: 0.4 CAPSULE ORAL at 21:35

## 2020-01-01 RX ADMIN — LEVOTHYROXINE SODIUM 100 MCG: 100 TABLET ORAL at 06:03

## 2020-01-01 RX ADMIN — AMIODARONE HYDROCHLORIDE 200 MG: 200 TABLET ORAL at 09:19

## 2020-01-01 RX ADMIN — TAMSULOSIN HYDROCHLORIDE 0.4 MG: 0.4 CAPSULE ORAL at 20:56

## 2020-01-01 RX ADMIN — GABAPENTIN 600 MG: 300 CAPSULE ORAL at 09:10

## 2020-01-01 RX ADMIN — PANTOPRAZOLE SODIUM 40 MG: 40 TABLET, DELAYED RELEASE ORAL at 05:12

## 2020-01-01 RX ADMIN — CARVEDILOL 3.12 MG: 3.12 TABLET, FILM COATED ORAL at 08:39

## 2020-01-01 RX ADMIN — SODIUM CHLORIDE, PRESERVATIVE FREE 10 ML: 5 INJECTION INTRAVENOUS at 21:24

## 2020-01-01 RX ADMIN — FUROSEMIDE 40 MG: 10 INJECTION, SOLUTION INTRAMUSCULAR; INTRAVENOUS at 14:12

## 2020-01-01 RX ADMIN — GABAPENTIN 600 MG: 300 CAPSULE ORAL at 14:55

## 2020-01-01 RX ADMIN — IPRATROPIUM BROMIDE AND ALBUTEROL SULFATE 3 ML: 2.5; .5 SOLUTION RESPIRATORY (INHALATION) at 19:52

## 2020-01-01 RX ADMIN — DOXYCYCLINE 100 MG: 100 INJECTION, POWDER, LYOPHILIZED, FOR SOLUTION INTRAVENOUS at 09:19

## 2020-01-01 RX ADMIN — SODIUM CHLORIDE, PRESERVATIVE FREE 10 ML: 5 INJECTION INTRAVENOUS at 09:05

## 2020-01-01 RX ADMIN — LEVOTHYROXINE SODIUM 100 MCG: 100 TABLET ORAL at 04:29

## 2020-01-01 RX ADMIN — GABAPENTIN 600 MG: 300 CAPSULE ORAL at 21:28

## 2020-01-01 RX ADMIN — AMIODARONE HYDROCHLORIDE 200 MG: 200 TABLET ORAL at 20:57

## 2020-01-01 RX ADMIN — ALLOPURINOL 100 MG: 100 TABLET ORAL at 09:16

## 2020-01-01 RX ADMIN — DILTIAZEM HYDROCHLORIDE 10 MG: 5 INJECTION INTRAVENOUS at 10:36

## 2020-01-01 RX ADMIN — SODIUM CHLORIDE 75 ML/HR: 9 INJECTION, SOLUTION INTRAVENOUS at 04:30

## 2020-01-01 RX ADMIN — CITALOPRAM HYDROBROMIDE 20 MG: 20 TABLET ORAL at 08:35

## 2020-01-01 RX ADMIN — GABAPENTIN 600 MG: 300 CAPSULE ORAL at 08:43

## 2020-01-01 RX ADMIN — ASPIRIN 81 MG 81 MG: 81 TABLET ORAL at 08:35

## 2020-01-01 RX ADMIN — PANTOPRAZOLE SODIUM 40 MG: 40 TABLET, DELAYED RELEASE ORAL at 09:19

## 2020-01-01 RX ADMIN — HEPARIN SODIUM 5000 UNITS: 5000 INJECTION, SOLUTION INTRAVENOUS; SUBCUTANEOUS at 14:15

## 2020-01-01 RX ADMIN — DOXYCYCLINE 100 MG: 100 INJECTION, POWDER, LYOPHILIZED, FOR SOLUTION INTRAVENOUS at 08:38

## 2020-01-01 RX ADMIN — AMIODARONE HYDROCHLORIDE 200 MG: 200 TABLET ORAL at 09:18

## 2020-01-01 RX ADMIN — BARIUM SULFATE 100 ML: 0.81 POWDER, FOR SUSPENSION ORAL at 13:15

## 2020-01-01 RX ADMIN — TAMSULOSIN HYDROCHLORIDE 0.4 MG: 0.4 CAPSULE ORAL at 22:37

## 2020-01-01 RX ADMIN — FUROSEMIDE 40 MG: 40 TABLET ORAL at 08:35

## 2020-01-01 RX ADMIN — FERROUS SULFATE TAB 325 MG (65 MG ELEMENTAL FE) 325 MG: 325 (65 FE) TAB at 08:39

## 2020-01-01 RX ADMIN — TAZOBACTAM SODIUM AND PIPERACILLIN SODIUM 3.38 G: 375; 3 INJECTION, SOLUTION INTRAVENOUS at 13:48

## 2020-01-01 RX ADMIN — FERROUS SULFATE TAB 325 MG (65 MG ELEMENTAL FE) 325 MG: 325 (65 FE) TAB at 09:14

## 2020-01-01 RX ADMIN — ASPIRIN 81 MG 81 MG: 81 TABLET ORAL at 09:17

## 2020-01-01 RX ADMIN — IOPAMIDOL 85 ML: 755 INJECTION, SOLUTION INTRAVENOUS at 10:44

## 2020-01-01 RX ADMIN — IPRATROPIUM BROMIDE AND ALBUTEROL SULFATE 3 ML: 2.5; .5 SOLUTION RESPIRATORY (INHALATION) at 07:20

## 2020-01-01 RX ADMIN — GABAPENTIN 600 MG: 300 CAPSULE ORAL at 21:23

## 2020-01-01 RX ADMIN — HEPARIN SODIUM 5000 UNITS: 5000 INJECTION INTRAVENOUS; SUBCUTANEOUS at 13:51

## 2020-01-01 RX ADMIN — OXYCODONE HYDROCHLORIDE AND ACETAMINOPHEN 1 TABLET: 10; 325 TABLET ORAL at 23:57

## 2020-01-01 RX ADMIN — TAZOBACTAM SODIUM AND PIPERACILLIN SODIUM 3.38 G: 375; 3 INJECTION, SOLUTION INTRAVENOUS at 06:19

## 2020-01-01 RX ADMIN — ALLOPURINOL 100 MG: 100 TABLET ORAL at 08:35

## 2020-01-01 RX ADMIN — AMIODARONE HYDROCHLORIDE 200 MG: 200 TABLET ORAL at 08:43

## 2020-01-01 RX ADMIN — GABAPENTIN 600 MG: 300 CAPSULE ORAL at 06:03

## 2020-01-01 RX ADMIN — IPRATROPIUM BROMIDE AND ALBUTEROL SULFATE 3 ML: 2.5; .5 SOLUTION RESPIRATORY (INHALATION) at 07:41

## 2020-01-01 RX ADMIN — AMIODARONE HYDROCHLORIDE 150 MG: 50 INJECTION, SOLUTION INTRAVENOUS at 16:44

## 2020-01-01 RX ADMIN — TAMSULOSIN HYDROCHLORIDE 0.4 MG: 0.4 CAPSULE ORAL at 20:02

## 2020-01-01 RX ADMIN — SODIUM CHLORIDE 250 ML: 9 INJECTION, SOLUTION INTRAVENOUS at 16:11

## 2020-01-01 RX ADMIN — MELATONIN TAB 5 MG 5 MG: 5 TAB at 20:52

## 2020-01-01 RX ADMIN — OXYCODONE HYDROCHLORIDE AND ACETAMINOPHEN 1 TABLET: 10; 325 TABLET ORAL at 09:20

## 2020-01-01 RX ADMIN — Medication 15 MG/HR: at 12:27

## 2020-01-01 RX ADMIN — CASTOR OIL AND BALSAM, PERU: 788; 87 OINTMENT TOPICAL at 09:11

## 2020-01-01 RX ADMIN — OXYCODONE HYDROCHLORIDE AND ACETAMINOPHEN 1 TABLET: 10; 325 TABLET ORAL at 12:10

## 2020-01-01 RX ADMIN — OXYCODONE HYDROCHLORIDE AND ACETAMINOPHEN 1 TABLET: 10; 325 TABLET ORAL at 09:12

## 2020-01-01 RX ADMIN — TAMSULOSIN HYDROCHLORIDE 0.4 MG: 0.4 CAPSULE ORAL at 20:52

## 2020-01-01 RX ADMIN — ALLOPURINOL 100 MG: 100 TABLET ORAL at 09:10

## 2020-01-01 RX ADMIN — OXYCODONE HYDROCHLORIDE AND ACETAMINOPHEN 1 TABLET: 10; 325 TABLET ORAL at 03:26

## 2020-01-01 RX ADMIN — OXYCODONE HYDROCHLORIDE AND ACETAMINOPHEN 0.5 TABLET: 5; 325 TABLET ORAL at 22:37

## 2020-01-01 RX ADMIN — EPINEPHRINE 1 MG: 0.1 INJECTION, SOLUTION ENDOTRACHEAL; INTRACARDIAC; INTRAVENOUS at 16:38

## 2020-01-01 RX ADMIN — SODIUM CHLORIDE, PRESERVATIVE FREE 10 ML: 5 INJECTION INTRAVENOUS at 20:52

## 2020-01-01 RX ADMIN — PANTOPRAZOLE SODIUM 40 MG: 40 TABLET, DELAYED RELEASE ORAL at 09:16

## 2020-01-01 RX ADMIN — LEVOTHYROXINE SODIUM 100 MCG: 100 TABLET ORAL at 05:34

## 2020-01-01 RX ADMIN — NALXONE HYDROCHLORIDE 0.4 MG: 0.4 INJECTION INTRAMUSCULAR; INTRAVENOUS; SUBCUTANEOUS at 10:30

## 2020-01-01 RX ADMIN — TAZOBACTAM SODIUM AND PIPERACILLIN SODIUM 3.38 G: 375; 3 INJECTION, SOLUTION INTRAVENOUS at 22:54

## 2020-01-01 RX ADMIN — CITALOPRAM HYDROBROMIDE 20 MG: 20 TABLET ORAL at 09:14

## 2020-01-01 RX ADMIN — HEPARIN SODIUM 5000 UNITS: 5000 INJECTION INTRAVENOUS; SUBCUTANEOUS at 20:01

## 2020-01-01 RX ADMIN — POTASSIUM CHLORIDE AND SODIUM CHLORIDE 200 ML/HR: 900; 300 INJECTION, SOLUTION INTRAVENOUS at 15:31

## 2020-01-01 RX ADMIN — SODIUM CHLORIDE, PRESERVATIVE FREE 10 ML: 5 INJECTION INTRAVENOUS at 21:07

## 2020-01-01 RX ADMIN — POTASSIUM CHLORIDE 40 MEQ: 750 CAPSULE, EXTENDED RELEASE ORAL at 08:43

## 2020-01-01 RX ADMIN — OXYCODONE HYDROCHLORIDE AND ACETAMINOPHEN 0.5 TABLET: 5; 325 TABLET ORAL at 17:54

## 2020-01-01 RX ADMIN — OXYCODONE HYDROCHLORIDE AND ACETAMINOPHEN 1 TABLET: 10; 325 TABLET ORAL at 16:53

## 2020-01-01 RX ADMIN — HEPARIN SODIUM 5000 UNITS: 5000 INJECTION INTRAVENOUS; SUBCUTANEOUS at 06:19

## 2020-01-01 RX ADMIN — MAGNESIUM SULFATE HEPTAHYDRATE 2 G: 500 INJECTION, SOLUTION INTRAMUSCULAR; INTRAVENOUS at 16:49

## 2020-01-01 RX ADMIN — EPINEPHRINE 1 MG: 0.1 INJECTION, SOLUTION ENDOTRACHEAL; INTRACARDIAC; INTRAVENOUS at 16:44

## 2020-01-01 RX ADMIN — CITALOPRAM HYDROBROMIDE 20 MG: 20 TABLET ORAL at 08:39

## 2020-01-01 RX ADMIN — TAMSULOSIN HYDROCHLORIDE 0.4 MG: 0.4 CAPSULE ORAL at 21:23

## 2020-01-01 RX ADMIN — POTASSIUM CHLORIDE 40 MEQ: 750 CAPSULE, EXTENDED RELEASE ORAL at 20:58

## 2020-01-01 RX ADMIN — ASPIRIN 81 MG 81 MG: 81 TABLET ORAL at 09:19

## 2020-01-01 RX ADMIN — EPINEPHRINE 1 MG: 0.1 INJECTION, SOLUTION ENDOTRACHEAL; INTRACARDIAC; INTRAVENOUS at 16:41

## 2020-01-01 RX ADMIN — DIGOXIN 250 MCG: 0.25 INJECTION INTRAMUSCULAR; INTRAVENOUS at 12:51

## 2020-01-01 RX ADMIN — AMIODARONE HYDROCHLORIDE 200 MG: 200 TABLET ORAL at 08:39

## 2020-01-01 RX ADMIN — AMIODARONE HYDROCHLORIDE 150 MG: 50 INJECTION, SOLUTION INTRAVENOUS at 16:36

## 2020-01-01 RX ADMIN — PANTOPRAZOLE SODIUM 40 MG: 40 TABLET, DELAYED RELEASE ORAL at 16:53

## 2020-01-01 RX ADMIN — ROSUVASTATIN CALCIUM 40 MG: 20 TABLET, COATED ORAL at 20:57

## 2020-01-01 RX ADMIN — POTASSIUM CHLORIDE 20 MEQ: 10 CAPSULE, COATED, EXTENDED RELEASE ORAL at 09:10

## 2020-01-01 RX ADMIN — GABAPENTIN 600 MG: 300 CAPSULE ORAL at 20:52

## 2020-01-01 RX ADMIN — Medication 15 MG/HR: at 22:11

## 2020-01-01 RX ADMIN — MORPHINE SULFATE 8 MG: 10 INJECTION INTRAVENOUS at 16:59

## 2020-01-01 RX ADMIN — IPRATROPIUM BROMIDE AND ALBUTEROL SULFATE 3 ML: 2.5; .5 SOLUTION RESPIRATORY (INHALATION) at 21:00

## 2020-01-01 RX ADMIN — SODIUM CHLORIDE 75 ML/HR: 9 INJECTION, SOLUTION INTRAVENOUS at 22:38

## 2020-01-01 RX ADMIN — FUROSEMIDE 40 MG: 40 TABLET ORAL at 09:18

## 2020-01-01 RX ADMIN — OXYCODONE HYDROCHLORIDE AND ACETAMINOPHEN 1 TABLET: 10; 325 TABLET ORAL at 21:04

## 2020-01-01 RX ADMIN — SODIUM CHLORIDE, PRESERVATIVE FREE 10 ML: 5 INJECTION INTRAVENOUS at 20:04

## 2020-01-01 RX ADMIN — POTASSIUM CHLORIDE 40 MEQ: 750 CAPSULE, EXTENDED RELEASE ORAL at 01:15

## 2020-01-01 RX ADMIN — MAGNESIUM SULFATE HEPTAHYDRATE 2 G: 500 INJECTION, SOLUTION INTRAMUSCULAR; INTRAVENOUS at 16:31

## 2020-01-01 RX ADMIN — CARVEDILOL 3.12 MG: 3.12 TABLET, FILM COATED ORAL at 09:18

## 2020-01-01 RX ADMIN — AMIODARONE HYDROCHLORIDE 200 MG: 200 TABLET ORAL at 09:10

## 2020-01-01 RX ADMIN — DOXYCYCLINE 100 MG: 100 CAPSULE ORAL at 20:02

## 2020-01-01 RX ADMIN — CITALOPRAM HYDROBROMIDE 20 MG: 20 TABLET ORAL at 09:15

## 2020-01-01 RX ADMIN — OXYCODONE HYDROCHLORIDE AND ACETAMINOPHEN 0.5 TABLET: 5; 325 TABLET ORAL at 05:34

## 2020-01-01 RX ADMIN — FERROUS SULFATE TAB 325 MG (65 MG ELEMENTAL FE) 325 MG: 325 (65 FE) TAB at 08:35

## 2020-01-01 RX ADMIN — SODIUM CHLORIDE, PRESERVATIVE FREE 10 ML: 5 INJECTION INTRAVENOUS at 09:23

## 2020-01-01 RX ADMIN — FUROSEMIDE 20 MG: 10 INJECTION, SOLUTION INTRAMUSCULAR; INTRAVENOUS at 17:54

## 2020-01-01 RX ADMIN — SODIUM CHLORIDE, PRESERVATIVE FREE 10 ML: 5 INJECTION INTRAVENOUS at 22:37

## 2020-01-01 RX ADMIN — ASPIRIN 81 MG 81 MG: 81 TABLET ORAL at 09:10

## 2020-01-01 RX ADMIN — AMIODARONE HYDROCHLORIDE 200 MG: 200 TABLET ORAL at 21:23

## 2020-01-01 RX ADMIN — PANTOPRAZOLE SODIUM 40 MG: 40 TABLET, DELAYED RELEASE ORAL at 05:44

## 2020-01-01 RX ADMIN — ALLOPURINOL 100 MG: 100 TABLET ORAL at 08:43

## 2020-01-01 RX ADMIN — CARVEDILOL 3.12 MG: 3.12 TABLET, FILM COATED ORAL at 17:11

## 2020-01-01 RX ADMIN — EPINEPHRINE 1 MG: 0.1 INJECTION, SOLUTION ENDOTRACHEAL; INTRACARDIAC; INTRAVENOUS at 16:27

## 2020-01-01 RX ADMIN — SODIUM CHLORIDE, PRESERVATIVE FREE 10 ML: 5 INJECTION INTRAVENOUS at 09:15

## 2020-01-01 RX ADMIN — PANTOPRAZOLE SODIUM 40 MG: 40 TABLET, DELAYED RELEASE ORAL at 06:03

## 2020-01-01 RX ADMIN — Medication 15 MG/HR: at 05:13

## 2020-01-01 RX ADMIN — FERROUS SULFATE TAB 325 MG (65 MG ELEMENTAL FE) 325 MG: 325 (65 FE) TAB at 09:18

## 2020-01-01 RX ADMIN — TAZOBACTAM SODIUM AND PIPERACILLIN SODIUM 3.38 G: 375; 3 INJECTION, SOLUTION INTRAVENOUS at 12:40

## 2020-01-01 RX ADMIN — IPRATROPIUM BROMIDE AND ALBUTEROL SULFATE 3 ML: 2.5; .5 SOLUTION RESPIRATORY (INHALATION) at 15:32

## 2020-01-01 RX ADMIN — TAZOBACTAM SODIUM AND PIPERACILLIN SODIUM 3.38 G: 375; 3 INJECTION, SOLUTION INTRAVENOUS at 13:51

## 2020-01-03 PROBLEM — I48.91 A-FIB (HCC): Status: ACTIVE | Noted: 2020-01-01

## 2020-01-08 PROBLEM — N17.9 AKI (ACUTE KIDNEY INJURY) (HCC): Status: ACTIVE | Noted: 2020-01-01

## 2020-01-08 PROBLEM — J18.9 HEALTHCARE-ASSOCIATED PNEUMONIA: Status: ACTIVE | Noted: 2020-01-01

## 2020-01-08 NOTE — OUTREACH NOTE
Prep Survey      Responses   Facility patient discharged from?  [facility]   Is patient eligible?  No   What are the reasons patient is not eligible?  Readmitted   Does the patient have one of the following disease processes/diagnoses(primary or secondary)?  Other   Prep survey completed?  Yes          Enriqueta Blake RN

## 2020-01-08 NOTE — TELEPHONE ENCOUNTER
Caller does not want to call 911.  She thought we at the health line would come over and help get him up out of the floor.  Advised her to call 911 for help.    Reason for Disposition  • Weakness, severe (i.e., unable to walk or barely able to walk, requires support) AND new onset or worsening    Additional Information  • Negative: [1] Caller is not with the adult (patient) AND [2] reporting urgent symptoms  • Negative: Lab result questions  • Negative: Medication questions  • Negative: Caller can't be reached by phone  • Negative: Caller has already spoken to PCP or another triager  • Negative: RN needs further essential information from caller in order to complete triage  • Negative: Requesting regular office appointment  • Negative: [1] Caller requesting NON-URGENT health information AND [2] PCP's office is the best resource  • Negative: Health Information question, no triage required and triager able to answer question  • Negative: General information question, no triage required and triager able to answer question  • Negative: Question about upcoming scheduled test, no triage required and triager able to answer question  • Negative: [1] Caller is not with the adult (patient) AND [2] probable NON-URGENT symptoms  • Negative: [1] Follow-up call to recent contact AND [2] information only call, no triage required  • Negative: CARDIAC ARREST suspected  • Negative: Breathing stopped  • Negative: Anaphylactic reaction (life-threatening allergic reaction)  • Negative: Asthma attack, severe (e.g., struggling for each breath, unable to speak)  • Negative: Bleeding (severe) from skin AND can't be stopped  • Negative: Bleeding (severe) from nose, mouth, vomiting, anus, vagina AND can't be stopped  • Negative: Difficulty breathing, severe (e.g., struggling for each breath, unable to speak)  • Negative: Burn, severe  • Negative: Choking, severe (e.g., unable to breathe, talk)  • Negative: Coma (e.g., unconscious, not  "responding to verbal or painful stimulus)  • Negative: Cyanosis, widespread  • Negative: Dehydration, severe (e.g., cold/pale/clammy skin, too weak to stand)  • Negative: Drowning, near  • Negative: Electrical shock, severe  • Negative: Heart attack suspected  • Negative: Homicidal intent or threat  • Negative: Hyperthermia (from heat exposure) > 105 F (40.5 C) rectally or > 104 F (40.0 C) orally  • Negative: Hypothermia (from cold exposure) < 95 F (35 C) rectally or < 94 F (34.4 C) orally  • Negative: Lightning strike injury  • Negative: Meningococcal sepsis suspected (purple spots/dots with fever)  • Negative: Mental status altered (confusion) now  • Negative: Neck trauma, major (e.g., MVA, diving, trampoline, contact sports, fall > 10 feet or 3 meters, hanging)  • Negative: Penetrating wound of chest or abdomen  • Negative: Purpura/petechiae with fever (purple spots/dots with fever)  • Negative: Respiratory distress, severe (e.g., struggling for each breath, unable to speak)  • Negative: Seizure lasts > 5 minutes or first seizure ever  • Negative: Seizure now  • Negative: Shock suspected (e.g., cold/pale/clammy skin, too weak to stand, low BP, rapid pulse)  • Negative: Stroke suspected (e.g., sudden onset of weakness of the face, arm or leg on one side of the body)  • Negative: Suicide attempt  • Negative: Trauma, severe  • Negative: Sounds like a life-threatening emergency to the triager    Answer Assessment - Initial Assessment Questions  1. REASON FOR CALL or QUESTION: \"What is your reason for calling today?\" or \"How can I best help you?\" or \"What question do you have that I can help answer?\"      Caller needs help getting her  up out of the floor.  Thought the nurse healthline could come over and get him out of the floor.  Advised her to call 911 for help.  She states her  has been a little confused since coming home from the hospital.    Answer Assessment - Initial Assessment Questions  1. " "ACUTE COMPLAINT: \"What is the main problem?\" \"Tell me what happened?\"       is in the floor and she cannot get him up.  2. AIRWAY: \"Is he / she breathing?\" (Yes, No, Unknown)      Breathing fine  3. BREATHING: \"Is there difficulty breathing?\" (Yes, No, Unknown)      No difficulty  4. CIRCULATION: \"Is there any bleeding?\" (Yes, No, Unknown)      No bleeding  5. CONSCIOUS: \"Is he/she awake, alert, and responding to you?\" (Yes, No, Unknown)      Alert, but confused, which is not new.    Protocols used: 911 SYMPTOMS-ADULT-, INFORMATION ONLY CALL-ADULT-      "

## 2020-01-13 NOTE — OUTREACH NOTE
Prep Survey      Responses   Facility patient discharged from?  [facility]   Is patient eligible?  Yes   Discharge diagnosis  sepsis,  pneumonia   Does the patient have one of the following disease processes/diagnoses(primary or secondary)?  Sepsis   Does the patient have Home health ordered?  Yes   What is the Home health agency?   [facility]   Is there a DME ordered?  No   Prep survey completed?  Yes          Hannah Dial RN

## 2020-01-13 NOTE — OUTREACH NOTE
Sepsis Week 1 Survey      Responses   Facility patient discharged from?  [facility]   Does the patient have one of the following disease processes/diagnoses(primary or secondary)?  Sepsis   Is there a successful TCM telephone encounter documented?  Yes          Fang Sheppard RN

## 2020-01-14 PROBLEM — Z95.0 PACEMAKER: Status: ACTIVE | Noted: 2020-01-01

## 2020-01-14 NOTE — PATIENT INSTRUCTIONS
Call cardiology and see if they want him on carvedilol 3.125 mg BID    Take potassium 20 mEq with lasix medication.

## 2020-01-14 NOTE — PROGRESS NOTES
Transitional Care Follow Up Visit  Subjective     Fidencio Zarate Jr. is a 74 y.o. male who presents for a transitional care management visit.    Within 48 business hours after discharge our office contacted him via telephone to coordinate his care and needs.      I reviewed and discussed the details of that call along with the discharge summary, hospital problems, inpatient lab results, inpatient diagnostic studies, and consultation reports with Fidencio.     Current outpatient and discharge medications have been reconciled for the patient.    Date of TCM Phone Call 10/25/2019 1/13/2020   [facility]    Date of Admission 10/19/2019 1/8/2020   Date of Discharge 10/22/2019 1/11/2020   Discharge Disposition Home or Self Care Home or Self Care     Risk for Readmission (LACE) Score: 14 (1/11/2020  6:00 AM)      Patient is a pleasant 74-year-old white male who presents for routine hospital follow-up.  Patient was recently admitted to [facility] on January 3 until January 7 after undergoing a colonoscopy and developing atrial fibrillation with RVR.  He had an ablation and a pacemaker was placed.  He is followed by cardiology and was seen today in their offices.  He was then admitted again on January 8 until January 11 with hospital-acquired pneumonia which was treated with antibiotics.  He still has a few days of antibiotics and will complete these.  He reports that he is feeling much better.  His shortness of breath and productive cough have improved significantly.  He has been told that he has COPD but is not currently being treated for this.  He was referred to a pulmonologist but canceled that appointment.  We will address his COPD at his next appointment and give him time to heal from his recent hospitalizations.  He was diagnosed with anemia secondary to gastritis diagnosed with EGD.  His recent hemoglobin in the hospital was stable at 10, improved compared to previous values.  He is  not taking iron supplements as he could not tolerate, had iron infusion in the last few weeks, which was ordered by nephrologist.  Recent colonoscopy revealed 10 polyps.  Believes he developed arrhythmia and became so sick on 01/03 due to procedure.  Recommend cologuard for next procedure and he agrees.  Recently started on amiodarone 200 mg BID and is tolerating this well.  Was given prescription for carvedilol 3.125 mg BID but never started this, will call cardiology to determine if he needs to be on this medication.  Not on entresto.  Has CHF with current leg swelling.  Taking lasix 40 mg prn.  Not on potassium, recent potassium level was low.  Taking ASA 81 mg daily, holding other anticoagulants and will defer to cardiology to restart these.  Patient's wife is present at appointment.       Duration of Hospital Stay:  1/03/20 to 1/07/20 and 1/08/2020 to 01/11/2020     The following portions of the patient's history were reviewed and updated as appropriate: allergies, current medications, past family history, past medical history, past social history, past surgical history and problem list.    Review of Systems   Constitutional: Positive for fatigue. Negative for chills, diaphoresis and fever.   HENT: Negative for congestion, postnasal drip and rhinorrhea.    Respiratory: Negative for cough, shortness of breath and wheezing.    Cardiovascular: Negative for chest pain and leg swelling.   Gastrointestinal: Negative for abdominal pain, blood in stool, constipation, diarrhea, nausea and vomiting.   Neurological: Positive for weakness. Negative for dizziness and headache.   Psychiatric/Behavioral: Positive for stress. Negative for self-injury, sleep disturbance, suicidal ideas and depressed mood. The patient is not nervous/anxious.        Current Outpatient Medications on File Prior to Visit   Medication Sig Dispense Refill   • allopurinol (ZYLOPRIM) 100 MG tablet Take 1 tablet by mouth Daily. 90 tablet 1   •  amiodarone (PACERONE) 200 MG tablet Take 1 tablet by mouth 2 (Two) Times a Day. 60 tablet 11   • aspirin 81 MG chewable tablet Chew 1 tablet Daily. 30 tablet 1   • Cinnamon 500 MG capsule Take 1,000 mg by mouth Daily.     • citalopram (CeleXA) 20 MG tablet TAKE 1 TABLET EVERY DAY 90 tablet 0   • doxycycline (MONODOX) 100 MG capsule Take 1 capsule by mouth Every 12 (Twelve) Hours for 6 doses. Indications: Pneumonia 6 capsule 0   • furosemide (LASIX) 40 MG tablet Take 1 tablet by mouth Daily.     • gabapentin (NEURONTIN) 600 MG tablet Take 600 mg by mouth 3 (Three) Times a Day.     • levothyroxine (SYNTHROID) 100 MCG tablet Take 1 tablet by mouth Daily. 30 tablet 0   • LUBRICANT EYE DROPS 0.4-0.3 % solution ophthalmic solution Administer 1 drop to both eyes at night as needed.     • nitroglycerin (NITROSTAT) 0.4 MG SL tablet Place 1 tablet under the tongue As Needed for Chest Pain. 10 tablet 11   • omeprazole (priLOSEC) 40 MG capsule Take 1 capsule by mouth Daily. 90 capsule 1   • oxyCODONE-acetaminophen (PERCOCET)  MG per tablet 1 tablet Every 6 (Six) Hours As Needed.     • tamsulosin (FLOMAX) 0.4 MG capsule 24 hr capsule Take 1 capsule by mouth Every Night. 90 capsule 3   • [DISCONTINUED] hydroCHLOROthiazide (HYDRODIURIL) 25 MG tablet 25 mg As Needed.     • [DISCONTINUED] rosuvastatin (CRESTOR) 40 MG tablet Take 40 mg by mouth Every Night.     • carvedilol (COREG) 3.125 MG tablet Take 1 tablet by mouth 2 (Two) Times a Day With Meals. 60 tablet 11   • [DISCONTINUED] ferrous sulfate 325 (65 FE) MG tablet Take 1 tablet by mouth Daily With Breakfast. 30 tablet 0     No current facility-administered medications on file prior to visit.        Results for orders placed or performed during the hospital encounter of 01/08/20   Blood Culture - Blood, Hand, Right   Result Value Ref Range    Blood Culture No growth at 5 days    Blood Culture - Blood, Arm, Right   Result Value Ref Range    Blood Culture No growth at 5 days     Respiratory Panel, PCR - Swab, Nasopharynx   Result Value Ref Range    ADENOVIRUS, PCR Not Detected Not Detected    Coronavirus 229E Not Detected Not Detected    Coronavirus HKU1 Not Detected Not Detected    Coronavirus NL63 Not Detected Not Detected    Coronavirus OC43 Not Detected Not Detected    Human Metapneumovirus Not Detected Not Detected    Human Rhinovirus/Enterovirus Not Detected Not Detected    Influenza B PCR Not Detected Not Detected    Parainfluenza Virus 1 Not Detected Not Detected    Parainfluenza Virus 2 Not Detected Not Detected    Parainfluenza Virus 3 Not Detected Not Detected    Parainfluenza Virus 4 Not Detected Not Detected    Bordetella pertussis pcr Not Detected Not Detected    Influenza A H1 2009 PCR Not Detected Not Detected    Chlamydophila pneumoniae PCR Not Detected Not Detected    Mycoplasma pneumo by PCR Not Detected Not Detected    Influenza A PCR Not Detected Not Detected    Influenza A H3 Not Detected Not Detected    Influenza A H1 Not Detected Not Detected    RSV, PCR Not Detected Not Detected    Bordetella parapertussis PCR Not Detected Not Detected   MRSA Screen, PCR - Swab, Nares   Result Value Ref Range    MRSA PCR Negative Negative   Comprehensive Metabolic Panel   Result Value Ref Range    Glucose 114 (H) 65 - 99 mg/dL    BUN 22 8 - 23 mg/dL    Creatinine 1.34 (H) 0.76 - 1.27 mg/dL    Sodium 128 (L) 136 - 145 mmol/L    Potassium 4.3 3.5 - 5.2 mmol/L    Chloride 90 (L) 98 - 107 mmol/L    CO2 22.0 22.0 - 29.0 mmol/L    Calcium 9.5 8.6 - 10.5 mg/dL    Total Protein 6.9 6.0 - 8.5 g/dL    Albumin 3.80 3.50 - 5.20 g/dL    ALT (SGPT) 43 (H) 1 - 41 U/L    AST (SGOT) 96 (H) 1 - 40 U/L    Alkaline Phosphatase 180 (H) 39 - 117 U/L    Total Bilirubin 1.9 (H) 0.2 - 1.2 mg/dL    eGFR Non African Amer 52 (L) >60 mL/min/1.73    Globulin 3.1 gm/dL    A/G Ratio 1.2 g/dL    BUN/Creatinine Ratio 16.4 7.0 - 25.0    Anion Gap 16.0 (H) 5.0 - 15.0 mmol/L   Troponin   Result Value Ref Range     Troponin T 0.094 (C) 0.000 - 0.030 ng/mL   Magnesium   Result Value Ref Range    Magnesium 1.8 1.6 - 2.4 mg/dL   Urinalysis With Microscopic If Indicated (No Culture) - Urine, Clean Catch   Result Value Ref Range    Color, UA Dark Yellow (A) Yellow, Straw    Appearance, UA Clear Clear    pH, UA <=5.0 5.0 - 8.0    Specific Gravity, UA 1.052 (H) 1.001 - 1.030    Glucose, UA Negative Negative    Ketones, UA Trace (A) Negative    Bilirubin, UA Negative Negative    Blood, UA Moderate (2+) (A) Negative    Protein, UA Trace (A) Negative    Leuk Esterase, UA Negative Negative    Nitrite, UA Negative Negative    Urobilinogen, UA 1.0 E.U./dL 0.2 - 1.0 E.U./dL   CBC Auto Differential   Result Value Ref Range    WBC 19.12 (H) 3.40 - 10.80 10*3/mm3    RBC 3.46 (L) 4.14 - 5.80 10*6/mm3    Hemoglobin 10.0 (L) 13.0 - 17.7 g/dL    Hematocrit 32.0 (L) 37.5 - 51.0 %    MCV 92.5 79.0 - 97.0 fL    MCH 28.9 26.6 - 33.0 pg    MCHC 31.3 (L) 31.5 - 35.7 g/dL    RDW 19.7 (H) 12.3 - 15.4 %    RDW-SD 66.5 (H) 37.0 - 54.0 fl    MPV 9.7 6.0 - 12.0 fL    Platelets 153 140 - 450 10*3/mm3    Neutrophil % 85.1 (H) 42.7 - 76.0 %    Lymphocyte % 2.8 (L) 19.6 - 45.3 %    Monocyte % 10.6 5.0 - 12.0 %    Eosinophil % 0.3 0.3 - 6.2 %    Basophil % 0.2 0.0 - 1.5 %    Immature Grans % 1.0 (H) 0.0 - 0.5 %    Neutrophils, Absolute 16.29 (H) 1.70 - 7.00 10*3/mm3    Lymphocytes, Absolute 0.53 (L) 0.70 - 3.10 10*3/mm3    Monocytes, Absolute 2.03 (H) 0.10 - 0.90 10*3/mm3    Eosinophils, Absolute 0.05 0.00 - 0.40 10*3/mm3    Basophils, Absolute 0.03 0.00 - 0.20 10*3/mm3    Immature Grans, Absolute 0.19 (H) 0.00 - 0.05 10*3/mm3    nRBC 0.0 0.0 - 0.2 /100 WBC   TSH   Result Value Ref Range    TSH 4.200 0.270 - 4.200 uIU/mL   T4, Free   Result Value Ref Range    Free T4 1.25 0.93 - 1.70 ng/dL   CK   Result Value Ref Range    Creatine Kinase 105 20 - 200 U/L   Salicylate Level   Result Value Ref Range    Salicylate <0.3 <=30.0 mg/dL   Ethanol   Result Value Ref Range     Ethanol <10 0 - 10 mg/dL   Acetaminophen Level   Result Value Ref Range    Acetaminophen <5.0 (L) 10.0 - 30.0 mcg/mL   Urine Drug Screen - Urine, Clean Catch   Result Value Ref Range    THC, Screen, Urine Negative Negative    Phencyclidine (PCP), Urine Negative Negative    Cocaine Screen, Urine Negative Negative    Methamphetamine, Ur Negative Negative    Opiate Screen Negative Negative    Amphetamine Screen, Urine Negative Negative    Benzodiazepine Screen, Urine Positive (A) Negative    Tricyclic Antidepressants Screen Negative Negative    Methadone Screen, Urine Negative Negative    Barbiturates Screen, Urine Negative Negative    Oxycodone Screen, Urine Positive (A) Negative    Propoxyphene Screen Negative Negative    Buprenorphine, Screen, Urine Negative Negative   Protime-INR   Result Value Ref Range    Protime 16.6 (H) 11.2 - 14.3 Seconds    INR 1.41 (H) 0.85 - 1.16   Blood Gas, Arterial With Co-Ox   Result Value Ref Range    Site Right Radial     Neeraj's Test N/A     pH, Arterial 7.379 7.350 - 7.450 pH units    pCO2, Arterial 35.7 mm Hg    pO2, Arterial 73.3 (L) 83.0 - 108.0 mm Hg    HCO3, Arterial 21.0 20.0 - 26.0 mmol/L    Base Excess, Arterial -3.6 (L) 0.0 - 2.0 mmol/L    Hemoglobin, Blood Gas 10.0 (L) 13.5 - 17.5 g/dL    Hematocrit, Blood Gas 30.6 %    Oxyhemoglobin 92.1 (L) 94 - 99 %    Methemoglobin 0.70 0.00 - 1.50 %    Carboxyhemoglobin 2.2 (H) 0 - 2 %    CO2 Content 22.1 22 - 33 mmol/L    Temperature 37.0 C    Barometric Pressure for Blood Gas      Modality Nasal Cannula     FIO2 28 %    Ventilator Mode       Note      pH, Temp Corrected 7.379 pH Units    pCO2, Temperature Corrected 35.7 35 - 48 mm Hg    pO2, Temperature Corrected 73.3 (L) 83 - 108 mm Hg   Lactic Acid, Plasma   Result Value Ref Range    Lactate 2.6 (C) 0.5 - 2.0 mmol/L   Troponin   Result Value Ref Range    Troponin T 0.094 (C) 0.000 - 0.030 ng/mL   Lactic Acid, Reflex Timer (This will reflex a repeat order 3-3:15 hours after  ordered.)   Result Value Ref Range    Extra Tube Hold for add-ons.    Lactic Acid, Reflex   Result Value Ref Range    Lactate 1.8 0.5 - 2.0 mmol/L   Troponin   Result Value Ref Range    Troponin T 0.082 (C) 0.000 - 0.030 ng/mL   Troponin   Result Value Ref Range    Troponin T 0.082 (C) 0.000 - 0.030 ng/mL   Troponin   Result Value Ref Range    Troponin T 0.076 (C) 0.000 - 0.030 ng/mL   Urinalysis, Microscopic Only - Urine, Clean Catch   Result Value Ref Range    RBC, UA 13-20 (A) None Seen, 0-2 /HPF    WBC, UA 0-2 None Seen, 0-2 /HPF    Bacteria, UA None Seen None Seen, Trace /HPF    Squamous Epithelial Cells, UA None Seen None Seen, 0-2 /HPF    Hyaline Casts, UA 0-6 0 - 6 /LPF    Methodology Automated Microscopy    Basic Metabolic Panel   Result Value Ref Range    Glucose 113 (H) 65 - 99 mg/dL    BUN 24 (H) 8 - 23 mg/dL    Creatinine 1.03 0.76 - 1.27 mg/dL    Sodium 128 (L) 136 - 145 mmol/L    Potassium 3.8 3.5 - 5.2 mmol/L    Chloride 93 (L) 98 - 107 mmol/L    CO2 20.0 (L) 22.0 - 29.0 mmol/L    Calcium 8.6 8.6 - 10.5 mg/dL    eGFR Non African Amer 71 >60 mL/min/1.73    BUN/Creatinine Ratio 23.3 7.0 - 25.0    Anion Gap 15.0 5.0 - 15.0 mmol/L   CBC (No Diff)   Result Value Ref Range    WBC 8.96 3.40 - 10.80 10*3/mm3    RBC 3.13 (L) 4.14 - 5.80 10*6/mm3    Hemoglobin 9.1 (L) 13.0 - 17.7 g/dL    Hematocrit 28.8 (L) 37.5 - 51.0 %    MCV 92.0 79.0 - 97.0 fL    MCH 29.1 26.6 - 33.0 pg    MCHC 31.6 31.5 - 35.7 g/dL    RDW 19.6 (H) 12.3 - 15.4 %    RDW-SD 65.9 (H) 37.0 - 54.0 fl    MPV 9.9 6.0 - 12.0 fL    Platelets 118 (L) 140 - 450 10*3/mm3   Procalcitonin   Result Value Ref Range    Procalcitonin 1.32 (H) 0.10 - 0.25 ng/mL   BNP   Result Value Ref Range    proBNP 21,827.0 (H) 5.0 - 900.0 pg/mL   CBC (No Diff)   Result Value Ref Range    WBC 6.06 3.40 - 10.80 10*3/mm3    RBC 3.10 (L) 4.14 - 5.80 10*6/mm3    Hemoglobin 9.2 (L) 13.0 - 17.7 g/dL    Hematocrit 29.0 (L) 37.5 - 51.0 %    MCV 93.5 79.0 - 97.0 fL    MCH 29.7  26.6 - 33.0 pg    MCHC 31.7 31.5 - 35.7 g/dL    RDW 19.3 (H) 12.3 - 15.4 %    RDW-SD 65.8 (H) 37.0 - 54.0 fl    MPV 9.9 6.0 - 12.0 fL    Platelets 133 (L) 140 - 450 10*3/mm3   Basic Metabolic Panel   Result Value Ref Range    Glucose 116 (H) 65 - 99 mg/dL    BUN 27 (H) 8 - 23 mg/dL    Creatinine 1.10 0.76 - 1.27 mg/dL    Sodium 126 (L) 136 - 145 mmol/L    Potassium 3.8 3.5 - 5.2 mmol/L    Chloride 90 (L) 98 - 107 mmol/L    CO2 19.0 (L) 22.0 - 29.0 mmol/L    Calcium 9.1 8.6 - 10.5 mg/dL    eGFR Non African Amer 66 >60 mL/min/1.73    BUN/Creatinine Ratio 24.5 7.0 - 25.0    Anion Gap 17.0 (H) 5.0 - 15.0 mmol/L   Hemoglobin A1c   Result Value Ref Range    Hemoglobin A1C 4.80 4.80 - 5.60 %   Basic Metabolic Panel   Result Value Ref Range    Glucose 136 (H) 65 - 99 mg/dL    BUN 26 (H) 8 - 23 mg/dL    Creatinine 1.04 0.76 - 1.27 mg/dL    Sodium 128 (L) 136 - 145 mmol/L    Potassium 3.9 3.5 - 5.2 mmol/L    Chloride 93 (L) 98 - 107 mmol/L    CO2 23.0 22.0 - 29.0 mmol/L    Calcium 9.5 8.6 - 10.5 mg/dL    eGFR Non African Amer 70 >60 mL/min/1.73    BUN/Creatinine Ratio 25.0 7.0 - 25.0    Anion Gap 12.0 5.0 - 15.0 mmol/L   Procalcitonin   Result Value Ref Range    Procalcitonin 0.63 (H) 0.10 - 0.25 ng/mL   Magnesium   Result Value Ref Range    Magnesium 2.0 1.6 - 2.4 mg/dL   POC Glucose Once   Result Value Ref Range    Glucose 128 70 - 130 mg/dL   POC Glucose Once   Result Value Ref Range    Glucose 132 (H) 70 - 130 mg/dL   POC Glucose Once   Result Value Ref Range    Glucose 130 70 - 130 mg/dL   POC Glucose Once   Result Value Ref Range    Glucose 182 (H) 70 - 130 mg/dL   POC Glucose Once   Result Value Ref Range    Glucose 139 (H) 70 - 130 mg/dL   POC Glucose Once   Result Value Ref Range    Glucose 147 (H) 70 - 130 mg/dL   POC Glucose Once   Result Value Ref Range    Glucose 146 (H) 70 - 130 mg/dL   POC Glucose Once   Result Value Ref Range    Glucose 123 70 - 130 mg/dL   POC Glucose Once   Result Value Ref Range    Glucose  "137 (H) 70 - 130 mg/dL   POC Glucose Once   Result Value Ref Range    Glucose 134 (H) 70 - 130 mg/dL   POC Glucose Once   Result Value Ref Range    Glucose 103 70 - 130 mg/dL   POC Glucose Once   Result Value Ref Range    Glucose 115 70 - 130 mg/dL   Light Blue Top   Result Value Ref Range    Extra Tube hold for add-on    Green Top (Gel)   Result Value Ref Range    Extra Tube Hold for add-ons.    Lavender Top   Result Value Ref Range    Extra Tube hold for add-on    Gold Top - SST   Result Value Ref Range    Extra Tube Hold for add-ons.        Visit Vitals  /70 (BP Location: Right arm, Patient Position: Sitting, Cuff Size: Adult)   Pulse 74   Ht 175.3 cm (69\")   Wt 93 kg (205 lb)   SpO2 97%   BMI 30.27 kg/m²     Body mass index is 30.27 kg/m².    Current outpatient and discharge medications have been reconciled for the patient.  Reviewed by: Deborah Henson PA-C    Objective   Physical Exam   Constitutional: Vital signs are normal. He appears well-developed and well-nourished. He is active and cooperative. He appears ill. No distress. He is not overweight.  HENT:   Head: Normocephalic and atraumatic.   Eyes: EOM are normal.   Neck: Normal range of motion.   Cardiovascular: Normal rate, regular rhythm and normal heart sounds.   Pulmonary/Chest: Effort normal. He has decreased breath sounds. He has no wheezes. He has no rhonchi. He has no rales.   Dry cough   Musculoskeletal: Normal range of motion.   Neurological: He is alert.   Skin: Skin is warm. He is not diaphoretic. No erythema.   Psychiatric: He has a normal mood and affect. His speech is normal and behavior is normal. Judgment and thought content normal. He is not actively hallucinating. He is attentive.   Vitals reviewed.      Assessment/Plan   Fidencio was seen today for transitional care management.    Diagnoses and all orders for this visit:    Hospital discharge follow-up  Recently admitted to [facility] on 2 separate occassions.  Admitted " on 01/03 to 01/07 after developing atrial fibrillation with RVR after colonoscopy.  Had ablation and pacemaker placed.  Followed by Dr. Mccauley, seen earlier today at his office.    Admitted again on 01/08 until 01/11 after he became short of breath, feverish and had a productive cough.  Diagnosed with hospital-acquired pneumonia and treated with antibiotics.  Doing much better today.  Reports improvement in SOB and cough.  Has a few more days of antibiotics and will complete these.    Hospital-acquired pneumonia  Lungs clear to auscultation today with baseline diminished breath sounds and mild wheezing.  He reports that he is feeling well overall.  Pulse ox on room air is 97%.    Paroxysmal atrial fibrillation (CMS/HCC)  -     TSH Rfx On Abnormal To Free T4  RRR today.  Underwent ablation recently.  Seen by cardiology today.  On ASA 81 mg daily.  On amiodarone 200 mg BID.    Pacemaker  Recently had pacemaker placed.  Followed by cardiology.    Essential hypertension  Stable, well-controlled.  Compliant on medications.    Acute on chronic combined systolic and diastolic congestive heart failure (CMS/HCC)  Followed by cardiology.  Not taking carvedilol 3.125 mg BID and will call cardiology to see if he needs to start this.  Taking lasix prn.  Was hypokalemic in hospital.  Not on potassium supplements, will prescribe potassium 20 mEq prn with lasix.  Repeat BMP today.  Is patient on entresto?    Coronary artery disease involving native coronary artery of native heart without angina pectoris  Followed by cardiology.  On rosuvastatin 40 mg QD.      Hyperlipidemia LDL goal <70  -     rosuvastatin (CRESTOR) 40 MG tablet; Take 1 tablet by mouth Every Night.  Continue rosuvastatin 40 mg daily.  Will repeat lipid panel at upcoming appointment.    Chronic kidney disease, stage III (moderate) (CMS/HCC)  Repeat BMP today.    Hyponatremia  -     Basic Metabolic Panel  Had hyponatremia during hospitalization, will repeat BMP  today.    Anemia due to [place] GI blood loss  Recent CBC was stable at 10.  Will repeat at next appointment.  States he is feeling better overall.    Hypothyroidism (acquired)  -     TSH Rfx On Abnormal To Free T4  On levothyroxine 100 mcg daily.    Chronic obstructive pulmonary disease, unspecified COPD type (CMS/HCC)  History of smoking.  Quit years ago.  Told he has COPD but is not currently being treated for this.  Will evaluate in the future with CT and PFT, want to wait until patient is feeling better.  States he was referred to pulmonology but cancelled appointment, may need new referral.    Other orders  -     potassium chloride ER (K-TAB) 20 MEQ tablet controlled-release ER tablet; Take 1 tablet by mouth Daily As Needed (take with lasix).

## 2020-01-15 NOTE — TELEPHONE ENCOUNTER
Spoke with patients wife and went over Dr. Vasquez recommendations for Lasix and potassium supplement. They are going to try this over the next few days and will call back for an appointment after that.

## 2020-01-15 NOTE — TELEPHONE ENCOUNTER
Patients wife called in stating that pt was seen in the office yesterday but is retaining 20-25lbs of fluid. Wife wants to know if the furosemide (LASIX) 40 MG tablet could be increased, 1 in am and 1 in pm (2day). Pt was in hospital 1/7-1/11. Please advise.      Pt. Call back# [phone]

## 2020-01-15 NOTE — TELEPHONE ENCOUNTER
This is okay for short-term such as the next 3 days, would recommend taking an additional potassium supplement with the extra lasix dose. Will need to follow up with Christen or cardiology and repeat blood work within the next week.

## 2020-01-16 NOTE — TELEPHONE ENCOUNTER
----- Message from Deborah Henson PA-C sent at 1/16/2020  3:16 PM EST -----  Labs are reassuring overall.  Letter mailed with results.  His thyroid is very high - most likely due to recent pneumonia.  I am going to increase his levothyroxine to 112 mcg daily.  Take at least 30 minutes before food/drink or other medications in the morning.  Best absorption of medication occurs on empty stomach.  Placed lab order for repeat thyroid test - want patient to return on or around February 15th for this so we have results prior to his next appointment.

## 2020-01-20 NOTE — OUTREACH NOTE
Sepsis Week 2 Survey      Responses   Facility patient discharged from?  [facility]   Does the patient have one of the following disease processes/diagnoses(primary or secondary)?  Sepsis   Week 2 attempt successful?  Yes   Call start time  1230   Call end time  1232   Discharge diagnosis  sepsis,  pneumonia   Meds reviewed with patient/caregiver?  Yes   Is the patient having any side effects they believe may be caused by any medication additions or changes?  No   Does the patient have all medications related to this admission filled (includes all antibiotics, inhalers, nebulizers,steroids,etc.)  Yes   Is the patient taking all medications as directed (includes completed medication regime)?  Yes   Does the patient have a primary care provider?   Yes   Does the patient have an appointment with their PCP within 7 days of discharge?  Greater than 7 days   Has the patient kept scheduled appointments due by today?  Yes   What is the Home health agency?   [facility]   Has home health visited the patient within 72 hours of discharge?  Yes   Psychosocial issues?  No   Did the patient receive a copy of their discharge instructions?  Yes   Nursing interventions  Reviewed instructions with patient   What is the patient's perception of their health status since discharge?  Improving   Nursing interventions  Nurse provided patient education   Is the patient/caregiver able to teach back Sepsis?  S - Shivering,fever or very cold, E - Extreme pain or generalized discomfort (worst ever,especially abdomen)   Nursing interventions  Nurse provided reassurance to patient   Is patient/caregiver able to teach back steps to recovery at home?  Set small, achievable goals for return to baseline health, Rest and regain strength   Is the patient/caregiver able to teach back signs and symptoms of worsening condition:  Hyperthermia, Fever   Is the patient/caregiver able to teach back the hierarchy of who to call/visit for symptoms/problems? PCP,  Specialist, Home health nurse, Urgent Care, ED, 911  Yes   Week 2 call completed?  Yes          Aldair Gordon RN

## 2020-01-22 NOTE — TELEPHONE ENCOUNTER
----- Message from Nel Barfield sent at 1/22/2020  8:39 AM EST -----  I have tried to call this patient 3 time. I can't reach her  ----- Message -----  From: Anatoly Garcia APRN  Sent: 1/16/2020   9:37 AM EST  To: Nel Barfield    His primary care provider called and reported that he has had increased swelling.  Can you call the patient and offer an early appointment tomorrow morning

## 2020-01-28 NOTE — PROGRESS NOTES
[facility]      Encounter Date:01/28/2020     Fidencio Zarate Jr.  [address] [address]  [unfilled]    1945    Deborah Henson PA-C    Fidencio Zarate Jr. is a 74 y.o. male.      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  History of CAD with prior CABG and bioprosthetic AVR.  Intolerant anticoagulation due to GI bleeding.  Chronic kidney disease stage III with baseline creatinine 1.3.  On 1/6/2020 had an AV node ablation and biventricular pacemaker implantation for A. fib with rapid ventricular rates despite AV kristian blocking agents.  Echocardiogram on 10/21/2019 with EF 41 to 45%, grade 2 diastolic dysfunction.    Pt report increased dypsnea, PND/orthopnea, fatigue.  20 lb weight gain in 3 weeks.  Spouse reports he is not taking Lasix 40 mg daily. Will will miss every 2-3 days (to rest his kidneys).  Denies cP, pressure, palpitaitons, dizziness, syncope. Reports poor appetitie and abdominal fullness.    Patient Active Problem List    Diagnosis Date Noted   • Pacemaker 01/14/2020   • BONNIE (acute kidney injury) (CMS/[facility]) 01/08/2020   • Healthcare-associated pneumonia 01/08/2020   • Atrial fibrillation with rapid ventricular response (CMS/[facility]) 01/03/2020   • Iron deficiency anemia, unspecified 11/21/2019   • Chronic kidney disease, stage III (moderate) (CMS/HCC) 11/21/2019   • Acute urinary retention 10/22/2019   • Suspected GI bleed, found to be gastritis on EGD 10/19/2019   • NSTEMI (non-ST elevated myocardial infarction) (CMS/HCC) 10/19/2019     Note Last Updated: 10/19/2019     · Presentation [facility] 10/19/2019 troponin of 7 in the setting of GI bleed, anemia and elevated creatinine     • Acute kidney failure (CMS/HCC) 10/19/2019   • Anemia due to [place] GI blood loss  10/19/2019   • Altered mental state 10/19/2019   • 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  Grade 2 diastolic dysfunction.  Moderate dilation of the aortic root at the sinuses of Valsalva present. Left ventricular wall segments are hypokinetic: apical lateral, basal inferolateral, mid inferolateral, apical inferior, basal inferoseptal, apex hypokinetic and basal inferoseptal. The following left ventricular wall segments are akinetic: basal inferior and mid inferior.  · Presentation to [facility] 10/19/2019 with elevated proBNP in setting of GI bleed, acute kidney injury with elevated creatinine and non-STEMI     • Palpitations 02/06/2019   • Chronic pain syndrome 09/19/2018   • Chronic back pain 09/19/2018   • Chronic pain of left knee 06/18/2018   • Paroxysmal atrial fibrillation (CMS/[facility])      Note Last Updated: 10/19/2019     · Diagnosed 09/2016.  · Chadsvasc=5  · 2-week monitor (03/2019): SVT versus atrial tachycardia.  10% burden PACs  · Intolerant to sotalol due to dizziness, placed on metoprolol  · Chronic anticoagulation with Eliquis  · Presented to [facility] with GI bleed, anemia and acute kidney failure on 10/19/2019 and EKG showed rate controlled atrial fibrillation     • Vitamin D deficiency 07/20/2016   • Headache syndrome 07/20/2016     Note Last Updated: 7/20/2016     1. Recent progressive disabling headache syndrome felt to be secondary to cluster headaches with headache specialist evaluation, MRI and ophthalmology assessment  x2 - data deficit, June-December 2015.       • Gastroesophageal reflux disease 06/24/2016   • Hyperlipidemia LDL goal <70 06/24/2016   • Hypogonadism in male 06/24/2016   • Malignant neoplasm of larynx (CMS/HCC) 06/24/2016     Note Last Updated: 7/20/2016     Description: actually is unknown head and neck cancer.    2. Recent left neck mass with abnormal PET scan demonstrating 3.5 cm. x 2.5 cm. mass with adjacent 9 mm. lymph node, hypermetabolic, with contemplated radiation/chemotherapy vs. resection, January 2011.       • Shoulder joint pain 06/24/2016     Note Last Updated: 6/24/2016     Description: improved with exercises.     • Coronary artery disease involving native coronary artery of native heart 06/16/2016     Note Last Updated: 10/19/2019     · Cardiac cath for acute MI (10/1984): Severe single-vessel CAD with preserved LVEF.  Data deficit  · Cardiac cath for recurrent MI (1989): Multivessel disease.  Referred for CABG  · CABG (01/1990) reportedly CABG x6.  Data deficit  · Cardiac cath for recurrent angina at [facility] (05/2007): PTCA/stent placement to SVG of the diagonal.  Incomplete visualization of LIMA to LAD.  Occluded SVG to circumflex.  Patent SVG to distal PDA.  High grade stenosis of the vein graft of the first diagonal branch of the left anterior descending artery with occluded vein graft to the left circumflex coronary artery with patent graft to the distal PDA  · Echo (2009): Normal LVEF 55%.  · Cardiac cath (2007): Patent LIMA to LAD.  Patent stents and vein graft of D1 to LAD.  Patent SVG to RCA.  Chronically occluded SVG to circumflex with adequate right to left collaterals.  Unchanged from prior study  · Cardiac cath (6/17/2016): Three-vessel CAD with occlusion of all coronary arteries and patent LIMA and patent SVG x2.  Medical therapy recommended       • Essential hypertension 06/16/2016     Note Last Updated: 9/28/2016           • COPD (chronic obstructive pulmonary disease)  "(CMS/HCC) 06/16/2016   • Hypothyroidism (acquired) 06/16/2016     Note Last Updated: 9/28/2016                 Past Surgical History:   Procedure Laterality Date   • APPENDECTOMY     • BIVENTRICULAR ASSIST DEVICE/LEFT VENTRICULAR ASSIST DEVICE INSERTION Left    • CARDIAC CATHETERIZATION      x1 stent   • CARDIAC CATHETERIZATION N/A 6/17/2016    Procedure: Left Heart Cath;  Surgeon: Son Lam MD;  Location:  [facility];  Service:    • CARDIAC ELECTROPHYSIOLOGY PROCEDURE N/A 1/6/2020    Procedure: EP/ABLATION;  Surgeon: Errol Reddy MD;  Location:  [facility];  Service: Cardiology   • CARDIAC ELECTROPHYSIOLOGY PROCEDURE N/A 1/6/2020    Procedure: BIVENTRICULAR DEVICE INSERTION;  Surgeon: Son Sommers MD;  Location:  [facility];  Service: Cardiology   • CARDIAC SURGERY     • CATARACT EXTRACTION  2009   • CHOLECYSTECTOMY LAPAROSCOPIC INTRAOPERATIVE CHOLANGIOGRAM WITH LIVER BIOPSY N/A 7/8/2016    Procedure: CHOLECYSTECTOMY LAPAROSCOPIC WITH LIVER BIOPSY;  Surgeon: Son Andrade MD;  Location:  [facility];  Service:    • COLONOSCOPY      x2   • CORONARY ARTERY BYPASS GRAFT     • ENDOSCOPY N/A 10/21/2019    Procedure: ESOPHAGOGASTRODUODENOSCOPY WITH BIOPSY;  Surgeon: Brunner, Mark I, MD;  Location:  [facility];  Service: Gastroenterology   • FOOT SURGERY  1962    right    • FRACTURE SURGERY     • LASIK     • MN ERCP DX COLLECTION SPECIMEN BRUSHING/WASHING N/A 6/24/2016    Procedure: ENDOSCOPIC RETROGRADE CHOLANGIOPANCREATOGRAPHY;  Surgeon: Omari Cardenas MD;  Location:  [facility];  Service: Gastroenterology   • TONSILLECTOMY     • WISDOM TOOTH EXTRACTION         Allergies   Allergen Reactions   • Penicillins Rash and Other (See Comments)     \"flu-like\" symptoms. Tolerated Zosyn in ED on 1/8 admission.   • Lipitor [Atorvastatin] Other (See Comments) and Myalgia     Arthralgias   • Metformin Nausea Only   • Sotalol " Dizziness   • Zocor [Simvastatin] Other (See Comments) and Myalgia     Arthralgias         Current Outpatient Medications:   •  allopurinol (ZYLOPRIM) 100 MG tablet, Take 1 tablet by mouth Daily. (Patient taking differently: Take 300 mg by mouth Daily.), Disp: 90 tablet, Rfl: 1  •  amiodarone (PACERONE) 200 MG tablet, Take 1 tablet by mouth 2 (Two) Times a Day., Disp: 60 tablet, Rfl: 11  •  aspirin 81 MG chewable tablet, Chew 1 tablet Daily., Disp: 30 tablet, Rfl: 1  •  aspirin-sod bicarb-citric acid (JOÃO-SELTZER) 325 MG effervescent tablet, Take 325 mg by mouth Every 6 (Six) Hours As Needed., Disp: , Rfl:   •  carvedilol (COREG) 3.125 MG tablet, Take 1 tablet by mouth 2 (Two) Times a Day With Meals., Disp: 60 tablet, Rfl: 11  •  Cinnamon 500 MG capsule, Take 1,000 mg by mouth Daily., Disp: , Rfl:   •  citalopram (CeleXA) 20 MG tablet, TAKE 1 TABLET EVERY DAY, Disp: 90 tablet, Rfl: 0  •  coenzyme Q10 100 MG capsule, Take 100 mg by mouth Daily., Disp: , Rfl:   •  furosemide (LASIX) 40 MG tablet, Take 1 tablet by mouth Daily. (Patient taking differently: Take 40 mg by mouth Daily As Needed.), Disp: , Rfl:   •  gabapentin (NEURONTIN) 600 MG tablet, Take 600 mg by mouth 2 (Two) Times a Day., Disp: , Rfl:   •  levothyroxine (SYNTHROID, LEVOTHROID) 112 MCG tablet, Take 1 tablet by mouth Daily. (Patient taking differently: Take 100 mcg by mouth Daily.), Disp: 30 tablet, Rfl: 1  •  LUBRICANT EYE DROPS 0.4-0.3 % solution ophthalmic solution, Administer 1 drop to both eyes at night as needed., Disp: , Rfl:   •  nitroglycerin (NITROSTAT) 0.4 MG SL tablet, Place 1 tablet under the tongue As Needed for Chest Pain., Disp: 10 tablet, Rfl: 11  •  omeprazole (priLOSEC) 40 MG capsule, Take 1 capsule by mouth Daily., Disp: 90 capsule, Rfl: 1  •  oxyCODONE-acetaminophen (PERCOCET)  MG per tablet, 1 tablet Every 6 (Six) Hours As Needed., Disp: , Rfl:   •  potassium chloride ER (K-TAB) 20 MEQ tablet controlled-release ER tablet,  Take 1 tablet by mouth Daily As Needed (take with lasix)., Disp: 30 tablet, Rfl: 5  •  rosuvastatin (CRESTOR) 40 MG tablet, Take 1 tablet by mouth Every Night., Disp: 30 tablet, Rfl: 5  •  simethicone (MYLICON) 125 MG chewable tablet, Chew 125 mg Every 6 (Six) Hours As Needed for Flatulence., Disp: , Rfl:   •  tamsulosin (FLOMAX) 0.4 MG capsule 24 hr capsule, Take 1 capsule by mouth Every Night., Disp: 90 capsule, Rfl: 3  •  Thiamine HCl (VITAMIN B-1) 50 MG tablet, Take 50 mg by mouth Daily., Disp: , Rfl:   •  vitamin B-12 (CYANOCOBALAMIN) 100 MCG tablet, Take 100 mcg by mouth Daily., Disp: , Rfl:   •  Vitamin D, Cholecalciferol, (CHOLECALCIFEROL) 10 MCG (400 UNIT) tablet, Take 400 Units by mouth Daily., Disp: , Rfl:     The following portions of the patient's history were reviewed and updated today during office visit as appropriate: allergies, current medications, past family history, past medical history, past social history, past surgical history and problem list.    Review of Systems   Constitution: Positive for malaise/fatigue.   Cardiovascular: Positive for dyspnea on exertion, orthopnea and paroxysmal nocturnal dyspnea.   Respiratory: Positive for shortness of breath.    Gastrointestinal: Positive for bloating.   All other systems reviewed and are negative.      Objective:     Vitals:    01/28/20 1355 01/28/20 1356   BP: 101/60 101/62   BP Location: Left arm Right arm   Patient Position: Sitting Sitting   Cuff Size: Large Adult Large Adult   Pulse: 81 80   SpO2: 91% 93%   Weight: 93.2 kg (205 lb 7 oz)          Physical Exam   Constitutional: He is oriented to person, place, and time. He appears well-developed and well-nourished. No distress.   HENT:   Mouth/Throat: Oropharynx is clear and moist.   Eyes: No scleral icterus.   Neck: No hepatojugular reflux and no JVD present. Carotid bruit is not present. No tracheal deviation present. No thyromegaly present.   Cardiovascular: Normal rate, regular rhythm,  normal heart sounds and intact distal pulses. Exam reveals no friction rub.   No murmur heard.  Pulmonary/Chest: Effort normal. He has rales (right lower lobe).   Abdominal: Bowel sounds are normal. He exhibits distension. There is no tenderness.   Musculoskeletal: He exhibits edema (bilateral edema 1+).   Lymphadenopathy:     He has no cervical adenopathy.   Neurological: He is alert and oriented to person, place, and time.   Skin: Skin is warm, dry and intact. No rash noted. No cyanosis or erythema. No pallor.   Psychiatric: He has a normal mood and affect. His behavior is normal. Thought content normal.   Vitals reviewed.      Lab and Diagnostic Review:  Lab Results   Component Value Date    WBC 6.06 01/10/2020    HGB 9.2 (L) 01/10/2020    HCT 29.0 (L) 01/10/2020    MCV 93.5 01/10/2020     (L) 01/10/2020     Lab Results   Component Value Date    GLUCOSE 136 (H) 01/11/2020    CALCIUM 9.3 01/14/2020     (L) 01/14/2020    K 3.8 01/14/2020    CO2 27.6 01/14/2020    CL 95 (L) 01/14/2020    BUN 16 01/14/2020    CREATININE 0.96 01/14/2020    EGFRIFAFRI 93 01/14/2020    EGFRIFNONA 77 01/14/2020    BCR 16.7 01/14/2020    ANIONGAP 12.0 01/11/2020       Assessment and Plan:         1. Acute on chronic combined systolic and diastolic congestive heart failure (CMS/HCC)  20 lb weight gain    Attempted Lasix IV, unsuccessful X 2    Pt to take Lasix 80 mg today    Increase Lasix 40 mg BID.  Continue potassium supplement.    - Basic Metabolic Panel; Future  - CBC & Differential; Future  - proBNP; Future    2. Paroxysmal atrial fibrillation (CMS/HCC)  S/p AV node ablation  And BIV ICD  amio    HR 80    No anticoagulation due to GI bleed, anemia    3. Ischemic heart disease  Asa, statin    4. Essential hypertension  Continue to monitor    F/u 2 weeks or sooner if needed.         *Please note that portions of this note were completed with a voice recognition program. Efforts were made to edit the dictations, but  occasionally words are mistranscribed.

## 2020-01-28 NOTE — OUTREACH NOTE
Sepsis Week 3 Survey      Responses   Facility patient discharged from?  [facility]   Does the patient have one of the following disease processes/diagnoses(primary or secondary)?  Sepsis   Week 3 attempt successful?  Yes   Call start time  1535   Call end time  1546   Discharge diagnosis  sepsis,  pneumonia   Person spoke with today (if not patient) and relationship  Vidya-ex-wife   Meds reviewed with patient/caregiver?  Yes   Is the patient taking all medications as directed (includes completed medication regime)?  Yes   Medication comments  Pt was advised to take 2 Lasix and 1 K+ on 1/28/20. Ordered by An[facility] Gracia at [facility]   Comments regarding appointments  Appt with Anatoly Garcia 1/28/20,  Dr. Snider on 2/18/20   Does the patient have a primary care provider?   Yes   Comments regarding PCP  Appt with PCP 2/28/20 for PE   Has the patient kept scheduled appointments due by today?  Yes   What is the Home health agency?   No more home health   Has home health visited the patient within 72 hours of discharge?  Yes   Psychosocial issues?  Yes   Nursing interventions  Notified family [His ex-wife lives with him]   Notified Case Management  Psychosocial (Non Urgent)   Comments  He uses a chair with wheels for ambulation. He has a walker and wheelchair also. It's not a chair with wheels he can push himself. Someone has to push him.   What is the patient's perception of their health status since discharge?  Worsening [Pt has more confused, is breathing worse, and holding more fluid. /60 today at appt.]   Nursing interventions  Nurse provided patient education   Is the patient/caregiver able to teach back Sepsis?  S - Shivering,fever or very cold, S - Sleepy, difficult to arouse,confused, S - Short of breath   Nursing interventions  Nurse provided patient education   Is patient/caregiver able to teach back steps to recovery at home?  Rest and regain strength, Set small, achievable goals for return  to baseline health, Eat a balanced diet   Is the patient/caregiver able to teach back signs and symptoms of worsening condition:  Fever, Hyperthermia, Shortness of breath/rapid respiratory rate, Altered mental status(confusion/coma)   If the patient is a current smoker, are they able to teach back resources for cessation?  -- [Pt is a nonsmoker]   Week 3 call completed?  Yes          Hannah Dial RN

## 2020-02-05 NOTE — TELEPHONE ENCOUNTER
Spoke with patient's wife who notes that he took lasix 40 mg bid for 2 days. She noted improvement in dyspnea, pedal edema but denied any weight loss. Patient's wife notes that symptoms returned when he returned to lasix 40 mg daily. Patient to return to lasix 40 mg bid for next 3 days. Continue potassium supplementation. At the end of the 3 days, if patient is still symptomatic may need to push up follow up appt with Anatoly SMITH to early part of next week. Patient's wife verbalized understanding.

## 2020-02-05 NOTE — TELEPHONE ENCOUNTER
Anatoly's patient    Patient's wife called about concerns that patient has been having fluid retention which seems to be pravelent in his legs. Patient's wife states that patient was instructed to take Lasix 40mg BID x2 days which helped with the fluid but states that the next morning the fluid would be right back to where it was before. She states that he has not been having a lot of SOB but has been taking Mucinex daily. Patient has an appointment on Friday with oncology and wife has mentioned that it is difficult for patient to get out.

## 2020-02-05 NOTE — OUTREACH NOTE
Sepsis Week 4 Survey      Responses   Facility patient discharged from?  [facility]   Does the patient have one of the following disease processes/diagnoses(primary or secondary)?  Sepsis   Week 4 attempt successful?  Yes   Call start time  0952   Call end time  1001   Is patient permission given to speak with other caregiver?  Yes   Person spoke with today (if not patient) and relationship  Vidya   Meds reviewed with patient/caregiver?  Yes   Is the patient having any side effects they believe may be caused by any medication additions or changes?  No   Is the patient taking all medications as directed (includes completed medication regime)?  Yes   Has the patient kept scheduled appointments due by today?  Yes   Comments  Has first appt with oncologist on 02/07/20.   Is the patient still receiving Home Health Services?  N/A   Psychosocial issues?  No   Psychosocial comments  Ex wife continues to live with him, and works 3 days per week.   What is the patient's perception of their health status since discharge?  Worsening   Nursing interventions  Nurse provided patient education   Is the patient/caregiver able to teach back the hierarchy of who to call/visit for symptoms/problems? PCP, Specialist, Home health nurse, Urgent Care, ED, 911  Yes   Week 4 call completed?  Yes   Would the patient like one additional call?  Yes   Wrap up additional comments  Ex wife states is still weak with increased edema of feet/lower legs/abdomen. Encouraged to call cardiologist immediately for evaluation. States is nervous about first appt with oncologist on Friday.          Dara Bruner RN

## 2020-02-07 PROBLEM — D50.9 IRON DEFICIENCY ANEMIA, UNSPECIFIED: Chronic | Status: ACTIVE | Noted: 2019-01-01

## 2020-02-07 NOTE — PROGRESS NOTES
CHIEF COMPLAINT: Anemia and thrombocytopenia    REASON FOR REFERRAL: Same      RECORDS OBTAINED  Records of the patients history including those obtained from primary care were reviewed and summarized in detail.    Oncology/Hematology History    1. Systolic and diastolic congestive heart failure  2. Coronary artery disease with coronary artery bypass grafting and bioprosthetic AVR  3. Stage III kidney disease but creatinine as high as 3.0 October 2019  4. Hyperlipidemia  5. Hyponatremia  6. COPD  7. History of laryngeal carcinoma treated by Dr. Rodriguez  8. Cirrhosis radiographically but without obvious cirrhosis on 7/8/2016 liver biopsy  9. Splenomegaly due to problem #8  10. Hospital-acquired pneumonia  11. Iron deficiency anemia with history of gastritis and multiple adenomatous polyps  History of systolic and diastolic congestive heart failure as well as essential hypertension.  Coronary artery disease with history of CABG and bioprosthetic AVR, stage III kidney disease but creatinine as high as 3.0 October 2019,, hyperlipidemia, hyponatremia.  History of COPD and hypothyroidism.  Carries a diagnosis of laryngeal carcinoma.  Pathology from 11/25/2011 vallecula biopsy, left tongue base, and mid tongue base showed squamous dysplasia with findings suggestive of HPV but no invasive carcinoma.  Liver biopsy 7/8/2016 showed nonspecific chronic inflammation with bile stasis and hepatocyte unrest with no cirrhosis but there is mild portal expansion and periportal fibrosis with only focal chronic portal inflammation changes the possibility of drug-induced hepatitis was raised as well as checking viral serologies.  12/12/2017 PET CT by Dr. Rodriguez for follow-up of laryngeal carcinoma done at [facility] showed no hypermetabolic primary laryngeal lesion and no other neoplastic spread but with cirrhotic morphology of the liver.  1/8/2020 liver ultrasound showed diffuse echogenic liver parenchyma with coarsened  texture consistent with hepatic steatosis but no focal liver lesions or ascites.  1/8/2020 CT angiography of the chest in addition to showing heart failure and cardiomegaly with alveolar disease right middle lobe consistent with pneumonia also showed reactive mediastinal adenopathy but no PE.  It did however show cirrhosis with splenomegaly  Inpatient 1/3/2020 through 1/7/2020 at [facility] for atrial fibrillation with RVR after colonoscopy.  Had ablation and pacer placement.  Inpatient 1/8/2020 through 1/11/2020 with hospital-acquired pneumonia treated with antibiotics    With history of iron deficiency anemia and hemoglobin 8.8, he was referred to Dr. Stapleton 12/17/2019 for colonoscopy showing 10 colon polyps as well as diverticulosis and hemorrhoids..  These were all tubular adenomas without high-grade dysplasia or malignancy.  Had a EGD 10/21/2019 with Dr. Mark Brunner with mild atrophic gastritis.  Had a history of colonoscopy in November 2017 with Dr. Stapleton showing colon polyps, diverticulosis, and internal hemorrhoids.  1/28/2020 hemoglobin 10.0 with MCV 88.5 and platelets 136,000 with normal white count 4150.  12/3/2019 LDH normal with haptoglobin elevated at 282 and reticulocyte count of 2.89% with hemoglobin 8.8.  Iron was low at 29 with decreased saturation 7%, normal total iron-binding capacity 422 and ferritin level low end of normal 38.  B12 was 669 with a folate greater than 20.        Malignant neoplasm of larynx (CMS/HCC)    6/24/2016 Initial Diagnosis     Malignant neoplasm of larynx (CMS/HCC)         HISTORY OF PRESENT ILLNESS:  The patient is a 74 y.o.  male, referred for anemia.  Received iron apparently this past fall without complication.  Has had thorough GI work-up as above.  Main issue is chronic back pain and left knee which needs replacement but not a good candidate for such and he is not interested.    REVIEW OF SYSTEMS:  A 14 point review of systems was performed and is negative except as  noted above.    Past Medical History:   Diagnosis Date   • Arthritis    • Cancer (CMS/HCC)     thoat cancer, s/p chemo and XRT   • Coronary artery disease    • Gout    • History of chemotherapy    • Hyperlipidemia    • Hypertension    • Injury of back    • Low back pain    • Myocardial infarction (CMS/HCC)     x2   • PAF (paroxysmal atrial fibrillation) (CMS/HCC)    • Rotator cuff rupture    • Uses hearing aid     bilat      Past Surgical History:   Procedure Laterality Date   • APPENDECTOMY     • BIVENTRICULAR ASSIST DEVICE/LEFT VENTRICULAR ASSIST DEVICE INSERTION Left    • CARDIAC CATHETERIZATION      x1 stent   • CARDIAC CATHETERIZATION N/A 6/17/2016    Procedure: Left Heart Cath;  Surgeon: Son Lam MD;  Location:  [facility] CATH INVASIVE LOCATION;  Service:    • CARDIAC ELECTROPHYSIOLOGY PROCEDURE N/A 1/6/2020    Procedure: EP/ABLATION;  Surgeon: Errol Reddy MD;  Location:  [facility] EP INVASIVE LOCATION;  Service: Cardiology   • CARDIAC ELECTROPHYSIOLOGY PROCEDURE N/A 1/6/2020    Procedure: BIVENTRICULAR DEVICE INSERTION;  Surgeon: Son Sommers MD;  Location:  [facility] CATH INVASIVE LOCATION;  Service: Cardiology   • CARDIAC SURGERY     • CATARACT EXTRACTION  2009   • CHOLECYSTECTOMY LAPAROSCOPIC INTRAOPERATIVE CHOLANGIOGRAM WITH LIVER BIOPSY N/A 7/8/2016    Procedure: CHOLECYSTECTOMY LAPAROSCOPIC WITH LIVER BIOPSY;  Surgeon: Son Andrade MD;  Location:  [facility] OR;  Service:    • COLONOSCOPY      x2   • CORONARY ARTERY BYPASS GRAFT     • ENDOSCOPY N/A 10/21/2019    Procedure: ESOPHAGOGASTRODUODENOSCOPY WITH BIOPSY;  Surgeon: Brunner, Mark I, MD;  Location:  [facility];  Service: Gastroenterology   • FOOT SURGERY  1962    right    • FRACTURE SURGERY     • LASIK     • NV ERCP DX COLLECTION SPECIMEN BRUSHING/WASHING N/A 6/24/2016    Procedure: ENDOSCOPIC RETROGRADE CHOLANGIOPANCREATOGRAPHY;  Surgeon: Omari Cardenas MD;  Location:  [facility];  Service: Gastroenterology    • TONSILLECTOMY     • WISDOM TOOTH EXTRACTION         Current Outpatient Medications on File Prior to Visit   Medication Sig Dispense Refill   • allopurinol (ZYLOPRIM) 100 MG tablet Take 1 tablet by mouth Daily. (Patient taking differently: Take 300 mg by mouth Daily.) 90 tablet 1   • amiodarone (PACERONE) 200 MG tablet Take 1 tablet by mouth 2 (Two) Times a Day. 60 tablet 11   • aspirin 81 MG chewable tablet Chew 1 tablet Daily. 30 tablet 1   • aspirin-sod bicarb-citric acid (JOÃO-SELTZER) 325 MG effervescent tablet Take 325 mg by mouth Every 6 (Six) Hours As Needed.     • carvedilol (COREG) 3.125 MG tablet Take 1 tablet by mouth 2 (Two) Times a Day With Meals. 60 tablet 11   • Cinnamon 500 MG capsule Take 1,000 mg by mouth Daily.     • citalopram (CeleXA) 20 MG tablet TAKE 1 TABLET EVERY DAY 90 tablet 0   • coenzyme Q10 100 MG capsule Take 100 mg by mouth Daily.     • furosemide (LASIX) 40 MG tablet Lasix 40 mg daily, extra 40 mg for 3-5 lb weight gain PRN     • gabapentin (NEURONTIN) 600 MG tablet Take 600 mg by mouth 2 (Two) Times a Day.     • levothyroxine (SYNTHROID, LEVOTHROID) 112 MCG tablet Take 1 tablet by mouth Daily. (Patient taking differently: Take 100 mcg by mouth Daily.) 30 tablet 1   • LUBRICANT EYE DROPS 0.4-0.3 % solution ophthalmic solution Administer 1 drop to both eyes at night as needed.     • nitroglycerin (NITROSTAT) 0.4 MG SL tablet Place 1 tablet under the tongue As Needed for Chest Pain. 10 tablet 11   • omeprazole (priLOSEC) 40 MG capsule Take 1 capsule by mouth Daily. 90 capsule 1   • oxyCODONE-acetaminophen (PERCOCET)  MG per tablet 1 tablet Every 6 (Six) Hours As Needed.     • potassium chloride ER (K-TAB) 20 MEQ tablet controlled-release ER tablet Take 1 tablet by mouth Daily. 30 tablet 5   • rosuvastatin (CRESTOR) 40 MG tablet Take 1 tablet by mouth Every Night. 30 tablet 5   • simethicone (MYLICON) 125 MG chewable tablet Chew 125 mg Every 6 (Six) Hours As Needed for  "Flatulence.     • tamsulosin (FLOMAX) 0.4 MG capsule 24 hr capsule Take 1 capsule by mouth Every Night. 90 capsule 3   • Thiamine HCl (VITAMIN B-1) 50 MG tablet Take 50 mg by mouth Daily.     • vitamin B-12 (CYANOCOBALAMIN) 100 MCG tablet Take 100 mcg by mouth Daily.     • Vitamin D, Cholecalciferol, (CHOLECALCIFEROL) 10 MCG (400 UNIT) tablet Take 400 Units by mouth Daily.       No current facility-administered medications on file prior to visit.        Allergies   Allergen Reactions   • Penicillins Rash and Other (See Comments)     \"flu-like\" symptoms. Tolerated Zosyn in ED on  admission.   • Lipitor [Atorvastatin] Other (See Comments) and Myalgia     Arthralgias   • Metformin Nausea Only   • Sotalol Dizziness   • Zocor [Simvastatin] Other (See Comments) and Myalgia     Arthralgias       Social History     Socioeconomic History   • Marital status:      Spouse name: Not on file   • Number of children: Not on file   • Years of education: Not on file   • Highest education level: Not on file   Occupational History   • Occupation: DJ   Tobacco Use   • Smoking status: Former Smoker     Packs/day: 1.50     Years: 50.00     Pack years: 75.00     Types: Cigarettes     Last attempt to quit: 6/15/2010     Years since quittin.6   • Smokeless tobacco: Never Used   Substance and Sexual Activity   • Alcohol use: Yes     Frequency: Monthly or less     Drinks per session: 1 or 2     Comment: beer occasional   • Drug use: No   • Sexual activity: Defer   Social History Narrative    Patient consumes 4 diet pepsi sodas daily.     Patient lives at home with wife.        Family History   Problem Relation Age of Onset   • COPD Mother    • Depression Mother    • Hypertension Mother    • Cancer Father 40        leukemia   • No Known Problems Sister    • No Known Problems Brother    • Hearing loss Maternal Grandmother    • No Known Problems Maternal Grandfather        PHYSICAL EXAM:    /66   Pulse 80   Temp 97.8 °F " "(36.6 °C)   Resp 16   Ht 175.3 cm (69\")   Wt 92.1 kg (203 lb)   SpO2 94%   BMI 29.98 kg/m²     ECOG: (2) Ambulatory & Capable of Self Care, Unable to Carry Out Work Activity, Up & About Greater Than 50% of Waking Hours  General: well appearing male in no acute distress  HEENT: sclera anicteric, oropharynx clear  Lymphatics: no cervical, supraclavicular, inguinal, or axillary adenopathy  Cardiovascular: regular rate and rhythm, no murmurs  Neck: Supple; No thyromegaly  Lungs: clear to auscultation bilaterally. No respiratory distress.   Abdomen: soft, nontender, nondistended.  No palpable organomegaly  Extremities: no cyanosis, clubbing, edema, or cords  Skin: no rashes, lesions, bruising, or petechiae  Neuro: Alert and oriented x 4; Moving all extremities.  Psych: No anxiety or depression    Lab Results   Component Value Date    HGB 10.0 (L) 01/28/2020    HCT 31.5 (L) 01/28/2020    MCV 88.5 01/28/2020     (L) 01/28/2020    WBC 4.15 01/28/2020    NEUTROABS 2.87 01/28/2020    LYMPHSABS 0.51 (L) 01/28/2020    MONOSABS 0.61 01/28/2020    EOSABS 0.09 01/28/2020    BASOSABS 0.05 01/28/2020     Lab Results   Component Value Date    GLUCOSE 118 (H) 01/28/2020    BUN 17 01/28/2020    CREATININE 1.07 01/28/2020     (L) 01/28/2020    K 4.5 01/28/2020    CL 90 (L) 01/28/2020    CO2 25.2 01/28/2020    CALCIUM 9.3 01/28/2020    PROTEINTOT 6.9 01/08/2020    ALBUMIN 3.80 01/08/2020    BILITOT 1.9 (H) 01/08/2020    ALKPHOS 180 (H) 01/08/2020    AST 96 (H) 01/08/2020    ALT 43 (H) 01/08/2020           Assessment/Plan     1. Anemia with thrombocytopenia  2. Elevated liver enzymes with modest elevation of bilirubin  3. Systolic and diastolic congestive heart failure  4. Coronary artery disease with coronary artery bypass grafting and bioprosthetic AVR  5. Stage III kidney disease but creatinine as high as 3.0 October 2019  6. Hyperlipidemia  7. Hyponatremia  8. COPD  9. History of laryngeal carcinoma treated by " Jennifer and Dr. Rodriguez and apparently by me with chemotherapy though I do not have those records any longer.  No evidence of disease per more recent imaging as below by Dr. Rodriguez and on serial follow-up with him.  10. Cirrhosis radiographically but without obvious cirrhosis on 7/8/2016 liver biopsy  11. Splenomegaly due to problem #8  12. Hospital-acquired pneumonia  13. Iron deficiency anemia with history of gastritis and multiple adenomatous polyps    History of systolic and diastolic congestive heart failure as well as essential hypertension.  Coronary artery disease with history of CABG and bioprosthetic AVR, stage III kidney disease but creatinine as high as 3.0 October 2019,, hyperlipidemia, hyponatremia.  History of COPD and hypothyroidism.  Carries a diagnosis of laryngeal carcinoma.  Pathology from 11/25/2011 vallecula biopsy, left tongue base, and mid tongue base showed squamous dysplasia with findings suggestive of HPV but no invasive carcinoma.  Liver biopsy 7/8/2016 showed nonspecific chronic inflammation with bile stasis and hepatocyte unrest with no cirrhosis but there is mild portal expansion and periportal fibrosis with only focal chronic portal inflammation changes the possibility of drug-induced hepatitis was raised as well as checking viral serologies.  12/12/2017 PET CT by Dr. Rodriguez for follow-up of laryngeal carcinoma done at [facility] showed no hypermetabolic primary laryngeal lesion and no other neoplastic spread but with cirrhotic morphology of the liver.  1/8/2020 liver ultrasound showed diffuse echogenic liver parenchyma with coarsened texture consistent with hepatic steatosis but no focal liver lesions or ascites.  1/8/2020 CT angiography of the chest in addition to showing heart failure and cardiomegaly with alveolar disease right middle lobe consistent with pneumonia also showed reactive mediastinal adenopathy but no PE.  It did however show cirrhosis with  splenomegaly  Inpatient 1/3/2020 through 1/7/2020 at [facility] for atrial fibrillation with RVR after colonoscopy.  Had ablation and pacer placement.  Inpatient 1/8/2020 through 1/11/2020 with hospital-acquired pneumonia treated with antibiotics    With history of iron deficiency anemia and hemoglobin 8.8, he was referred to Dr. Stapleton 12/17/2019 for colonoscopy showing 10 colon polyps as well as diverticulosis and hemorrhoids..  These were all tubular adenomas without high-grade dysplasia or malignancy.  Had a EGD 10/21/2019 with Dr. Mark Brunner with mild atrophic gastritis.  Had a history of colonoscopy in November 2017 with Dr. Stapleton showing colon polyps, diverticulosis, and internal hemorrhoids.  1/28/2020 hemoglobin 10.0 with MCV 88.5 and platelets 136,000 with normal white count 4150.  12/3/2019 LDH normal with haptoglobin elevated at 282 and reticulocyte count of 2.89% with hemoglobin 8.8.  Iron was low at 29 with decreased saturation 7%, normal total iron-binding capacity 422 and ferritin level low end of normal 38.  B12 was 669 with a folate greater than 20.      Discussion: I reviewed all the above data with the patient.  I suspect the mild thrombocytopenia and partially the anemia is related to splenic sequestration from underlying liver disease though that has been worked up partially as outlined above.  There were no varices mentioned on EGD back in October.  I will check his methylmalonic acid and homocystine for completeness sake though the B12 and folate have been normal.  I will repeat his iron indices and he may well still have some iron deficiency for which she is intolerant of oral iron and may benefit from further IV iron though there is some risk of anaphylaxis with this as I explained.  With thrombocytopenia I strongly suspect that he has portal hypertension with splenomegaly on prior imaging.  We will get a liver spleen ultrasound with vascular surgeons for more clarity on this.  With his  chronically swollen lower extremities on chronic diuretics for heart failure, he could have pulmonary hypertension from his congestive heart failure with subsequent increased right heart pressures causing functional portal hypertension and hypersplenism.  That would unify all of his exam, thrombocytopenia, and anemia explanations.  Myelodysplasia would be unlikely with normal white count and differential with normal MCV and I am not sure he would want to do much in light of that with all of his other comorbidities as outlined above even if I were to find such so we will hold on the bone marrow biopsy.  Though his bilirubin is slightly elevated, he is never been jaundiced and I doubt that this is hemolysis and will hold on that work-up for now as well though he could have Rajinder syndrome with thrombocytopenia and anemia both immune mediated.  Discussed with patient 45 minutes face-to-face greater than 50% spent counseling regarding this plan    Fawad Zelaya MD    2/7/2020

## 2020-02-14 NOTE — OUTREACH NOTE
Sepsis Week 5 Survey      Responses   Facility patient discharged from?  [facility]   Does the patient have one of the following disease processes/diagnoses(primary or secondary)?  Sepsis   Week 5 attempt successful?  Yes   Call start time  1602   Call end time  1608   Discharge diagnosis  sepsis,  pneumonia   Meds reviewed with patient/caregiver?  Yes   Is the patient having any side effects they believe may be caused by any medication additions or changes?  No   Is the patient taking all medications as directed (includes completed medication regime)?  Yes   Has the patient kept scheduled appointments due by today?  Yes   Is the patient still receiving Home Health Services?  N/A   Psychosocial issues?  No   What is the patient's perception of their health status since discharge?  Improving   Nursing interventions  Nurse provided patient education   Is the patient/caregiver able to teach back Sepsis?  S - Shivering,fever or very cold, E - Extreme pain or generalized discomfort (worst ever,especially abdomen), P - Pale or discolored skin, S - Sleepy, difficult to arouse,confused, I -   I feel like I might die-a feeling of hopelessness, S - Short of breath   Nursing interventions  Nurse provided reassurance to patient   Is patient/caregiver able to teach back steps to recovery at home?  Set small, achievable goals for return to baseline health, Rest and regain strength, Talk about feelings with family/friends, Eat a balanced diet, Make a list of questions for PCP appoinment   Is the patient/caregiver able to teach back signs and symptoms of worsening condition:  Fever, Hyperthermia, Rapid heart rate (>90), Edema   Is the patient/caregiver able to teach back the hierarchy of who to call/visit for symptoms/problems? PCP, Specialist, Home health nurse, Urgent Care, ED, 911  Yes   Additional teach back comments  Still swelling considerably in legs, no shortness of breath, fluid around his abdomen.   Graduated  Yes   Did  the patient feel the follow up calls were helpful during their recovery period?  Yes          Marybeth Miller RN

## 2020-02-18 NOTE — TELEPHONE ENCOUNTER
Med refill needed for gabapentin (NEURONTIN) 600 MG tablet and tamsulosin (FLOMAX) 0.4 MG capsule 24 hr capsule, pharmacy confirmed [facility] [address] [place] [address] [address] - [phone]  - [phone]   [phone]

## 2020-02-24 PROBLEM — N28.9 ACUTE RENAL INSUFFICIENCY: Status: ACTIVE | Noted: 2020-01-01

## 2020-02-25 PROBLEM — I50.22 CHRONIC SYSTOLIC CHF (CONGESTIVE HEART FAILURE) (HCC): Status: ACTIVE | Noted: 2020-01-01

## 2020-02-26 PROBLEM — N28.9 ACUTE RENAL INSUFFICIENCY: Status: RESOLVED | Noted: 2020-01-01 | Resolved: 2020-01-01

## 2020-02-27 NOTE — OUTREACH NOTE
Patient doing well per spouse.  He is voiding frequently which has interrupted his sleep.  Tolerating PO.  Monitoring BP twice a day. Confirmed follow up appointment for tomorrow.

## 2020-02-27 NOTE — OUTREACH NOTE
Medical Week 1 Survey      Responses   Facility patient discharged from?  [facility]   Does the patient have one of the following disease processes/diagnoses(primary or secondary)?  Other   Is there a successful TCM telephone encounter documented?  Yes          Fang Sheppard RN

## 2020-02-27 NOTE — OUTREACH NOTE
Prep Survey      Responses   Facility patient discharged from?  [facility]   Is patient eligible?  Yes   Discharge diagnosis  acute renal insulliciency   Does the patient have one of the following disease processes/diagnoses(primary or secondary)?  Other   Does the patient have Home health ordered?  Yes   What is the Home health agency?   [facility]   Is there a DME ordered?  Yes   What DME was ordered?  [facility]   Comments regarding appointments  see AVS multiple apmts   Prep survey completed?  Yes          Macrina Aguilar RN

## 2020-03-02 NOTE — TELEPHONE ENCOUNTER
Marcella with [facility] called regarding the pt. She wanted to inform Deborah that the pt refused Home Health coming on Friday and would prefer them to come this Tuesday, so they are going to.     Phone [phone]

## 2020-03-03 PROBLEM — F33.1 MODERATE EPISODE OF RECURRENT MAJOR DEPRESSIVE DISORDER (HCC): Status: ACTIVE | Noted: 2020-01-01

## 2020-03-03 PROBLEM — M1A.09X0 CHRONIC GOUT OF MULTIPLE SITES: Status: ACTIVE | Noted: 2020-01-01

## 2020-03-03 NOTE — TELEPHONE ENCOUNTER
FARHAT FROM [facility]  CALLED WANTING TO LET OFFICE KNOW PT IS REFUSING HOME HEALTH. REACHED WIFE THIS AM SHE STATES THEY DON'T NEED HOME HEALTH     PLEASE ADVISE AND GIVE CALL  [phone]

## 2020-03-03 NOTE — PROGRESS NOTES
Transitional Care Follow Up Visit  Subjective     Fidencio Zarate Jr. is a 74 y.o. male who presents for a transitional care management visit.    Within 48 business hours after discharge our office contacted him via telephone to coordinate his care and needs.      I reviewed and discussed the details of that call along with the discharge summary, hospital problems, inpatient lab results, inpatient diagnostic studies, and consultation reports with Fidencio.     Current outpatient and discharge medications have been reconciled for the patient.    Date of TCM Phone Call 10/25/2019 1/13/2020 2/27/2020   [facility]    Date of Admission 10/19/2019 1/8/2020 2/24/2020   Date of Discharge 10/22/2019 1/11/2020 2/26/2020   Discharge Disposition Home or Self Care Home or Self Care Home-Health Care [facility] [place] [facility]     Risk for Readmission (LACE) Score: 11 (2/26/2020  6:00 AM)      Patient is a pleasant 74-year-old white male who presents for routine hospital follow-up.  Patient was recently admitted to [facility] from 2/24 to 2/26 for acute kidney insufficiency.  He presented to the ER with weakness and presyncope.  He was found to be hypotensive and dehydrated.  He has been taking his Lasix 80 mg twice daily instead of as prescribed.  He was stabilized and released home.  Told to hold his carvedilol and lasix due to hypotension.  Blood pressure is low today.  He is established with cardiology and has upcoming appointment.    Patient has a long and complicated history of atrial fibrillation, HTN, hyperlipidemia, CAD, CHF, anemia, CKD, GERD, COPD, hypothyroidism, BPH, gout, chronic pain, back pain, left knee pain and depression.  Patient has been hospitalized multiple times in the last few months.  He has been discontinued from his anticoagulants due to possible GI bleed and anemia.  Established with hematology and nephrology.  He has ongoing kidney decline and dysfunction.  He is on  multiple medications.  Followed by pain management for his chronic pain issues.  He reports that he is feeling rather poorly today and has been since his recent hospitalization.  His wife is present as well.  There is some possible confusion, will check urinalysis given that there was abnormal urine during hospital stay.  He has worsening depression with recent decline in health.  He is still taking citalopram 20 mg daily.     Duration of Hospital Stay:  2 nihgts     The following portions of the patient's history were reviewed and updated as appropriate: allergies, current medications, past family history, past medical history, past social history, past surgical history and problem list.    Current outpatient and discharge medications have been reconciled for the patient.  Reviewed by: Deborah Henson PA-C    Review of Systems   Constitutional: Positive for fatigue. Negative for chills, diaphoresis and fever.   HENT: Negative for congestion, postnasal drip and rhinorrhea.    Respiratory: Negative for cough, shortness of breath and wheezing.    Cardiovascular: Negative for chest pain and leg swelling.   Gastrointestinal: Positive for GERD. Negative for abdominal pain, constipation, diarrhea, nausea and vomiting.   Endocrine: Negative for polydipsia, polyphagia and polyuria.   Genitourinary: Positive for frequency and urgency. Negative for dysuria, flank pain and hematuria.   Musculoskeletal: Positive for arthralgias, back pain, gait problem and myalgias.   Neurological: Positive for dizziness, weakness, numbness and confusion. Negative for headache.   Psychiatric/Behavioral: Positive for sleep disturbance, depressed mood and stress. Negative for self-injury and suicidal ideas. The patient is not nervous/anxious.        Current Outpatient Medications on File Prior to Visit   Medication Sig Dispense Refill   • allopurinol (ZYLOPRIM) 100 MG tablet Take 1 tablet by mouth Daily. (Patient taking differently: Take  300 mg by mouth Daily.) 90 tablet 1   • amiodarone (PACERONE) 200 MG tablet Take 1 tablet by mouth 2 (Two) Times a Day. 60 tablet 11   • aspirin 81 MG chewable tablet Chew 1 tablet Daily. 30 tablet 1   • aspirin-sod bicarb-citric acid (JOÃO-SELTZER) 325 MG effervescent tablet Take 325 mg by mouth Every 6 (Six) Hours As Needed.     • carvedilol (COREG) 3.125 MG tablet Take 1 tablet by mouth 2 (Two) Times a Day With Meals. 60 tablet 11   • Cinnamon 500 MG capsule Take 1,000 mg by mouth Daily.     • coenzyme Q10 100 MG capsule Take 100 mg by mouth Daily.     • furosemide (LASIX) 40 MG tablet Lasix 40 mg daily, extra 40 mg for 3-5 lb weight gain PRN (Patient taking differently: Take 40 mg by mouth See Admin Instructions. Lasix 40 mg daily, extra 40 mg for 3-5 lb weight gain PRN)     • gabapentin (NEURONTIN) 600 MG tablet Take 600 mg by mouth 2 (Two) Times a Day.     • LUBRICANT EYE DROPS 0.4-0.3 % solution ophthalmic solution Administer 1 drop to both eyes at night as needed.     • nitroglycerin (NITROSTAT) 0.4 MG SL tablet Place 1 tablet under the tongue As Needed for Chest Pain. 10 tablet 11   • omeprazole (priLOSEC) 40 MG capsule TAKE 1 CAPSULE EVERY DAY 90 capsule 1   • oxyCODONE-acetaminophen (PERCOCET)  MG per tablet 1 tablet Every 6 (Six) Hours As Needed.     • potassium chloride ER (K-TAB) 20 MEQ tablet controlled-release ER tablet Take 1 tablet by mouth Daily. 30 tablet 5   • rosuvastatin (CRESTOR) 40 MG tablet Take 1 tablet by mouth Every Night. 30 tablet 5   • simethicone (MYLICON) 125 MG chewable tablet Chew 125 mg Every 6 (Six) Hours As Needed for Flatulence.     • tamsulosin (FLOMAX) 0.4 MG capsule 24 hr capsule Take 1 capsule by mouth Every Night. 90 capsule 3   • Thiamine HCl (VITAMIN B-1) 50 MG tablet Take 50 mg by mouth Daily.     • vitamin B-12 (CYANOCOBALAMIN) 100 MCG tablet Take 100 mcg by mouth Daily.     • Vitamin D, Cholecalciferol, (CHOLECALCIFEROL) 10 MCG (400 UNIT) tablet Take 400 Units  "by mouth Daily.       No current facility-administered medications on file prior to visit.        Results for orders placed or performed in visit on 02/28/20   POC Urinalysis Dipstick, Automated   Result Value Ref Range    Color Dark Yellow Yellow, Straw, Dark Yellow, Guillermina    Clarity, UA Clear Clear    Specific Gravity  1.010 1.005 - 1.030    pH, Urine 7.0 5.0 - 8.0    Leukocytes Negative Negative    Nitrite, UA Negative Negative    Protein, POC 2+ (A) Negative mg/dL    Glucose, UA Negative Negative, 1000 mg/dL (3+) mg/dL    Ketones, UA Negative Negative    Urobilinogen, UA Normal Normal    Bilirubin Negative Negative    Blood, UA Negative Negative       Visit Vitals  /70   Pulse 83   Ht 175.3 cm (69\")   Wt 85.9 kg (189 lb 6.4 oz)   SpO2 93%   BMI 27.97 kg/m²     Body mass index is 27.97 kg/m².    Objective   Physical Exam   Constitutional: Vital signs are normal. He appears well-developed and well-nourished. He is active and cooperative. He has a sickly appearance. No distress. He appears overweight.   HENT:   Head: Normocephalic and atraumatic.   Eyes: EOM are normal.   Neck: Normal range of motion.   Cardiovascular: Normal rate and regular rhythm.   Pulmonary/Chest: Effort normal and breath sounds normal.   Musculoskeletal: Normal range of motion. He exhibits no edema.   Neurological: He is alert.   Skin: Skin is warm. He is not diaphoretic. No erythema.   Psychiatric: His speech is normal. Judgment and thought content normal. He is withdrawn. He is not actively hallucinating. Cognition and memory are impaired. He exhibits a depressed mood. He is attentive.   Vitals reviewed.      Assessment/Plan   Fidencio was seen today for medicare wellness-subsequent.    Diagnoses and all orders for this visit:    Hospital discharge follow-up    Atherosclerosis of coronary artery bypass graft of native heart without angina pectoris  On rosuvastatin 40 mg daily.    Essential hypertension  Low blood pressure today.  Patient " is holding carvedilol and lasix.  Follow-up with cardiology.    PAF (paroxysmal atrial fibrillation) (CMS/HCC)  On amiodarone.  Holding anticoagulant due to anemia.  Followed by cardiology.    Mixed hyperlipidemia  On rosuvastatin 40 mg daily.    Stage 3 chronic kidney disease (CMS/HCC)  Followed by nephrology.  Recent BMP shows improvement.    Hyponatremia  Encouraged to reduce fluid intake given low sodium of 130.  Repeat BMP at follow-up.    Anemia, unspecified type  Recent CBC shows improvement.    BPH with obstruction/lower urinary tract symptoms  Ongoing issue.  On flomax which helps with symptoms.    Abnormal urine  -     POC Urinalysis Dipstick, Automated  -     Urine Culture - Urine, Urine, Random Void  Urinalysis normal.    Moderate episode of recurrent major depressive disorder (CMS/HCC)  -     citalopram (CeleXA) 20 MG tablet; Take 1 tablet by mouth Daily.  Worsening depression with recent health issues.  No suicidal ideation.    Hypothyroidism (acquired)  -     levothyroxine (SYNTHROID, LEVOTHROID) 125 MCG tablet; Take 1 tablet by mouth Daily.  Recent TSH is very abnormal.  Repeat in a few weeks.  Will increase levothyroxine today but only slightly.  TSH may be abnormal due to recent illness.    Chronic obstructive pulmonary disease, unspecified COPD type (CMS/HCC)  Not on any inhalers.  Denies any worsening shortness of breath or symptoms.  Not very active.  Limited currently by pain, weakness and cardiac issues.    Gastroesophageal reflux disease without esophagitis  Stable with omeprazole.    Chronic gout of multiple sites, unspecified cause  On allopurinol.  Unclear what dose patient is taking.  Believe it is 300 mg, he will check at home.    Chronic pain of left knee  Chronic pain syndrome  Chronic midline low back pain without sciatica  Followed by pain management.  On multiple controlled substances.  Has been on these for years.    Headache syndrome  Denies headache today.    Malignant neoplasm  of larynx (CMS/HCC)

## 2020-03-03 NOTE — TELEPHONE ENCOUNTER
LVM explaining that Trini can cancel HH orders since she is refusing. Office # given incase Trini had any further questions.

## 2020-03-05 NOTE — OUTREACH NOTE
Medical Week 2 Survey      Responses   [facility] patient discharged from?  [place]   Does the patient have one of the following disease processes/diagnoses(primary or secondary)?  Other   Week 2 attempt successful?  Yes   Call start time  1016   Discharge diagnosis  acute renal insulliciency   Call end time  1021   List who call center can speak with  Vidya, exwife   Person spoke with today (if not patient) and relationship  Vidya   Meds reviewed with patient/caregiver?  Yes   Is the patient having any side effects they believe may be caused by any medication additions or changes?  No   Does the patient have all medications ordered at discharge?  Yes   Is the patient taking all medications as directed (includes completed medication regime)?  Yes   Medication comments  Edema is staying down   Does the patient have a primary care provider?   Yes   Does the patient have an appointment with their PCP within 7 days of discharge?  Yes   Has the patient kept scheduled appointments due by today?  Yes   What is the Home health agency?   [facility]   Has home health visited the patient within 72 hours of discharge?  No   Home health comments  Decided did not need   What DME was ordered?  [employer]   Has all DME been delivered?  Yes   Comments  Has had no falls since got home   What is the patient's perception of their health status since discharge?  Improving   Week 2 Call Completed?  Yes   Wrap up additional comments  No edema. /70. Has not had any falls since he got home. Regurgitates his food when he coughs. Has an appt to address this.           Gogo Garcia RN

## 2020-03-06 NOTE — TELEPHONE ENCOUNTER
Pharmacy called and there is an interaction with cipro and amiodarone.  Will switch to bactrim given patient's allergies and medicines.    Spoke with patient and his wife.  He is going to start bactrim and call if he is unable to tolerate it.  Aware it can cause confusion in some individuals that are older.

## 2020-03-06 NOTE — TELEPHONE ENCOUNTER
----- Message from Deborah Henson PA-C sent at 3/6/2020  8:50 AM EST -----  Please call patient.  His urine culture returned showing infection.  This may be contributing to him not feeling well.  I have sent in a prescription for ciprofloxacin 500 mg twice a day for 7 days.  Drink lots of water with this medicine.  We will repeat his urine at his next appointment in a few weeks to ensure that it has resolved.

## 2020-03-09 NOTE — TELEPHONE ENCOUNTER
Amiodarone can interact with most medications to some degree.  The pharmacist felt that bactrim would be one of the safer medications to take and I agree.  If patient is concerned, recommend contacting cardiology to ensure they are okay with treatment of UTI with bactrim.

## 2020-03-09 NOTE — TELEPHONE ENCOUNTER
PT'S WIFE CALLED STATING THAT SHE WOULD LIKE TO KNOW IF THERE IS  ANY INTERACTION WITH   amiodarone (PACERONE) 200 MG tablet AND BACTRIM. SHOULD PT DISREGARD TAKING TOGETHER.      PLEASE ADVISE PT @ [phone]

## 2020-03-10 NOTE — TELEPHONE ENCOUNTER
Patients wife called on patients behalf and requests a return calling regarding some sick reactions to a recent medication prescription. Please return call and advise.

## 2020-03-11 NOTE — TELEPHONE ENCOUNTER
Spoke with patients wife regarding the side effects they were calling in about.   The patient is not tolerating bactrim very well and they have decided to d/c it themselves. The patient refused to take any more.   I informed the patients wife that Deborah is out for the day but I would send the message back and we would give them a call tomorrow, 3/12/20.  Patients wife verbalized understanding and did not have any further questions at this time.

## 2020-03-12 NOTE — TELEPHONE ENCOUNTER
How many doses was patient able to complete?  What were the side effects?  Okay to remain off of medication.  Please place in allergy list for intolerance with reported side effects.    How is patient doing overall?

## 2020-03-12 NOTE — OUTREACH NOTE
Medical Week 3 Survey      Responses   [facility] patient discharged from?  [place]   Does the patient have one of the following disease processes/diagnoses(primary or secondary)?  Other   Week 3 attempt successful?  Yes   Call start time  1032   Call end time  1035   Discharge diagnosis  acute renal insulliciency   Is patient permission given to speak with other caregiver?  Yes   List who call center can speak with  Vidya, exwife   Person spoke with today (if not patient) and relationship  Vidya   Meds reviewed with patient/caregiver?  Yes   Is the patient having any side effects they believe may be caused by any medication additions or changes?  No   Does the patient have all medications ordered at discharge?  Yes   Is the patient taking all medications as directed (includes completed medication regime)?  Yes   Medication comments  Edema is staying down   Does the patient have a primary care provider?   Yes   Does the patient have an appointment with their PCP within 7 days of discharge?  Yes   Has the patient kept scheduled appointments due by today?  Yes   What is the Home health agency?   [facility]   Has home health visited the patient within 72 hours of discharge?  Yes   What DME was ordered?  [employer]   Has all DME been delivered?  Yes   Psychosocial issues?  No   Psychosocial comments  Ex wife continues to live with him, and works 3 days per week.   Notified Case Management  Psychosocial (Non Urgent)   Did the patient receive a copy of their discharge instructions?  Yes   Nursing interventions  Reviewed instructions with patient   What is the patient's perception of their health status since discharge?  Same   Is the patient/caregiver able to teach back signs and symptoms related to disease process for when to call PCP?  Yes   Is the patient/caregiver able to teach back signs and symptoms related to disease process for when to call 911?  Yes   Is the patient/caregiver able to teach back the hierarchy of who to  call/visit for symptoms/problems? PCP, Specialist, Home health nurse, Urgent Care, ED, 911  Yes   Additional teach back comments  Still with edema to legs, weight fluctuating. No SOB currently. Patient weighs daily. If no improvement in symptoms seek medical care.    Week 3 Call Completed?  Yes          Hollis Bess RN

## 2020-03-13 NOTE — TELEPHONE ENCOUNTER
Pts wife contacted, Pt is feeling much better since he has stopped the Bactrim  Advised him to DC this med per Deborah

## 2020-03-21 PROBLEM — R53.1 GENERALIZED WEAKNESS: Status: ACTIVE | Noted: 2020-01-01

## 2020-03-21 NOTE — PROGRESS NOTES
I was called to the patient's bedside by the hospitalist.  He noted that the patient had been undergoing a CODE BLUE for roughly 30 minutes.  He had persistently prolonged ventricular tachycardia.  They had done multiple shocks, and given amiodarone, and lidocaine but addition of this they gave multiple rounds of magnesium.  Patient was noted to have a low potassium on admission and that was in the process of being replaced when the patient coded.  Cardiology was contacted by the hospitalist and noted that there was nothing else they had to offer other than to continue to use amiodarone.  Therefore given the fact that the patient had ventricular fibrillation for a prolonged period of time, with no return of spontaneous circulation we decided to call the code at 5035 3/21/2020.  Dr. Jeremy Blackmon did have a conversation with the patient's family prior to calling the code and the family agreed that at that point they would not want to continue resuscitation.    Aaron Dugan, DO  Pulmonary and Critical Care

## 2020-03-21 NOTE — SIGNIFICANT NOTE
"Pt transferred from ED. Placed on telemetry monitor and initial VS taken. Admission started with pt and wife at bedside. Pt answering questions and interacting. RN noted telemetry alarm which appeared to be v-tach. RN asked pt if he was okay which pt responded \"yes.\" RN noted that pt then closed his eyes and began displaying signs of dyspnea. Sternal rub was performed with no response. Pulse was checked and was not palpable. Code Blue was initiated.   "

## 2020-03-21 NOTE — SIGNIFICANT NOTE
Exam confirms with auscultation zero audible heart tones and zero audible respirations. Mr.Henry VASYL Zarate Jr. was pronounced dead at 1653 by Dr. Aaron Dugan at the bedside.      Tamanna Agee RN  Clinical House Supervisor  3/21/2020 17:12

## 2020-03-21 NOTE — H&P
[facility] Medicine Services  HISTORY AND PHYSICAL    Patient Name: Fidencio Zarate Jr.  : 1945  MRN: [id number]  Primary Care Physician: Deborah Henson PA-C  Date of admission: 3/21/2020      Subjective   Subjective     Chief Complaint:  Weakness    HPI:  Fidencio Zarate Jr. is a 74 y.o. male here with a past medical history of CAD s/p CABG, CKD 2, preDM, bioprosthetic AVR, afib, not on AC due to chronic anemia/TCP, history of laryngeal CA s/p treatment, severe SHF, presenting with 2 days progressive weakness. He has severe debility at baseline and is essentially restricted to bed or chair most of the time. However in the past 2 days he's too weak to sit up and gets dizzy. He hasn't been eating or drinking as well because he's been unable to sit up without worsening symptoms, but continues his diuretics as ordered.  He was found to have mild BONNIE and severe hypokalemia on his laboratory findings.     Review of Systems   Constitutional: Positive for activity change, appetite change and fatigue.   HENT: Negative.    Respiratory: Negative.    Cardiovascular: Positive for leg swelling. Negative for chest pain.        Chronic leg swelling and stable   Gastrointestinal: Negative.    Endocrine: Positive for cold intolerance.   Genitourinary: Negative.    Musculoskeletal: Negative.    Skin: Negative.    Neurological: Positive for weakness.        Chronic generalized, but worse   Psychiatric/Behavioral: Positive for confusion.        All other systems reviewed and are negative.     Personal History     Past Medical History:   Diagnosis Date   • Arthritis    • Cancer (CMS/HCC)     thoat cancer, s/p chemo and XRT   • Coronary artery disease    • Gout    • History of chemotherapy    • Hyperlipidemia    • Hypertension    • Injury of back    • Low back pain    • Myocardial infarction (CMS/HCC)     x2   • PAF (paroxysmal atrial fibrillation) (CMS/HCC)    • Rotator cuff rupture    •  Uses hearing aid     bilat        Past Surgical History:   Procedure Laterality Date   • APPENDECTOMY     • BIVENTRICULAR ASSIST DEVICE/LEFT VENTRICULAR ASSIST DEVICE INSERTION Left    • CARDIAC CATHETERIZATION      x1 stent   • CARDIAC CATHETERIZATION N/A 6/17/2016    Procedure: Left Heart Cath;  Surgeon: Son Lam MD;  Location:  [facility];  Service:    • CARDIAC ELECTROPHYSIOLOGY PROCEDURE N/A 1/6/2020    Procedure: EP/ABLATION;  Surgeon: Errol Reddy MD;  Location:  [facility];  Service: Cardiology   • CARDIAC ELECTROPHYSIOLOGY PROCEDURE N/A 1/6/2020    Procedure: BIVENTRICULAR DEVICE INSERTION;  Surgeon: Son Sommers MD;  Location:  [facility];  Service: Cardiology   • CARDIAC SURGERY     • CATARACT EXTRACTION  2009   • CHOLECYSTECTOMY LAPAROSCOPIC INTRAOPERATIVE CHOLANGIOGRAM WITH LIVER BIOPSY N/A 7/8/2016    Procedure: CHOLECYSTECTOMY LAPAROSCOPIC WITH LIVER BIOPSY;  Surgeon: Son Andrade MD;  Location:  [facility];  Service:    • COLONOSCOPY      x2   • CORONARY ARTERY BYPASS GRAFT     • ENDOSCOPY N/A 10/21/2019    Procedure: ESOPHAGOGASTRODUODENOSCOPY WITH BIOPSY;  Surgeon: Brunner, Mark I, MD;  Location:  [facility];  Service: Gastroenterology   • FOOT SURGERY  1962    right    • FRACTURE SURGERY     • LASIK     • [place] ERCP DX COLLECTION SPECIMEN BRUSHING/WASHING N/A 6/24/2016    Procedure: ENDOSCOPIC RETROGRADE CHOLANGIOPANCREATOGRAPHY;  Surgeon: Omari Cardenas MD;  Location:  [facility];  Service: Gastroenterology   • TONSILLECTOMY     • WISDOM TOOTH EXTRACTION         Family History: family history includes COPD in his mother; Cancer (age of onset: 40) in his father; Depression in his mother; Hearing loss in his maternal grandmother; Hypertension in his mother; No Known Problems in his brother, maternal grandfather, and sister. Otherwise pertinent FHx was reviewed and unremarkable.     Social History:   "reports that he quit smoking about 9 years ago. His smoking use included cigarettes. He has a 75.00 pack-year smoking history. He has never used smokeless tobacco. He reports that he drinks alcohol. He reports that he does not use drugs.  Social History     Social History Narrative    Patient consumes 4 diet pepsi sodas daily.     Patient lives at home with wife.        Medications:  Available home medication information reviewed.    (Not in a hospital admission)    Allergies   Allergen Reactions   • Penicillins Rash and Other (See Comments)     \"flu-like\" symptoms. Tolerated Zosyn in ED on 1/8 admission.   • Bactrim [Sulfamethoxazole-Trimethoprim] Nausea Only   • Lipitor [Atorvastatin] Other (See Comments) and Myalgia     Arthralgias   • Metformin Nausea Only   • Sotalol Dizziness   • Zocor [Simvastatin] Other (See Comments) and Myalgia     Arthralgias       Objective   Objective     Vital Signs:   Temp:  [98.5 °F (36.9 °C)] 98.5 °F (36.9 °C)  Heart Rate:  [77-80] 78  Resp:  [18] 18  BP: ()/(60) 93/60        Physical Exam   NAD, alert and oriented  OP clear, dry MM  Neck supple  No LAD  Speech clear and fluent, face symmetric  RRR with 1/6 systolic murmur  Decreased at bases, otherwise clear  +BS, ND, NT, soft  No c/c, 2+ B LE edema, pitting an nontender to proximal tibia  Mild Stage I pressure sores B knees/heels  Answers questions appropriately, follows commands  Flat affect    Results Reviewed:  I have personally reviewed current lab and radiology data.    Results from last 7 days   Lab Units 03/21/20  1325   WBC 10*3/mm3 8.68   HEMOGLOBIN g/dL 10.1*   HEMATOCRIT % 31.2*   PLATELETS 10*3/mm3 132*     Results from last 7 days   Lab Units 03/21/20  1325   SODIUM mmol/L 130*   POTASSIUM mmol/L 2.1*   CHLORIDE mmol/L 81*   CO2 mmol/L 35.0*   BUN mg/dL 31*   CREATININE mg/dL 1.61*   GLUCOSE mg/dL 115*   CALCIUM mg/dL 9.5   ALT (SGPT) U/L 38   AST (SGOT) U/L 142*   TROPONIN T ng/mL 0.050*   LACTATE mmol/L 2.4*   "   PROCALCITONIN ng/mL 0.31*     Estimated Creatinine Clearance: 44.3 mL/min (A) (by C-G formula based on SCr of 1.61 mg/dL (H)).  Brief Urine Lab Results  (Last result in the past 365 days)      Color   Clarity   Blood   Leuk Est   Nitrite   Protein   CREAT   Urine HCG        03/21/20 1315 Dark Yellow Clear Moderate (2+) Negative Negative 100 mg/dL (2+)             Imaging Results (Last 24 Hours)     Procedure Component Value Units Date/Time    XR Chest 1 View [[id number]] Collected:  03/21/20 1334     Updated:  03/21/20 1338    Narrative:          EXAMINATION: XR CHEST 1 VW-      INDICATION: Weak/Dizzy/AMS triage protocol      COMPARISON: Chest x-ray 02/26/2020     FINDINGS: Cardiomegaly with increased pulmonary vascularity similar to  prior comparison as there is central perihilar predominant pulmonary  opacifications without pneumothorax or effusion. Left chest wall pacing  device with leads intact. Degenerative changes of the spine and  shoulders right greater than left.           Impression:       Persistent cardiomegaly with increased pulmonary vascularity  and perihilar predominant pulmonary opacifications without effusion.              Results for orders placed during the hospital encounter of 02/24/20   Adult Transthoracic Echo Complete W/ Cont if Necessary Per Protocol    Narrative · The left ventricular cavity is mildly dilated.  · Left ventricular systolic function is severely decreased.  · Right ventricular cavity is borderline dilated.  · Left atrial cavity size is severely dilated.  · Mild-to-moderate mitral valve regurgitation is present  · Calcific nodularity of aortic valve without outflow obstruction  · Inferior wall and posterior wall hypokinetic          Assessment/Plan   Assessment & Plan     Active Hospital Problems    Diagnosis POA   • **Generalized weakness [R53.1] Yes   • Stage 3 chronic kidney disease (CMS/HCC) [N18.3] Yes   • Altered mental state [R41.82] Yes   • Hyponatremia [E87.1] Yes    • Acute on chronic combined systolic and diastolic congestive heart failure (CMS/[facility]) [I50.43] Yes     · Echo (10/6/2016): LVEF 50%  · Echo (3/6/2019): Reduced LVEF 40%.  Grade 2 diastolic dysfunction.  Moderate dilation of the aortic root at the sinuses of Valsalva present. Left ventricular wall segments are hypokinetic: apical lateral, basal inferolateral, mid inferolateral, apical inferior, basal inferoseptal, apex hypokinetic and basal inferoseptal. The following left ventricular wall segments are akinetic: basal inferior and mid inferior.  · Presentation to [facility] 10/19/2019 with elevated proBNP in setting of GI bleed, acute kidney injury with elevated creatinine and non-STEMI     • BPH with obstruction/lower urinary tract symptoms [N40.1, N13.8] Yes   • Gastroesophageal reflux disease [K21.9] Yes   • Hypokalemia [E87.6] Unknown   • COPD (chronic obstructive pulmonary disease) (CMS/[facility]) [J44.9] Yes   • Hypothyroidism (acquired) [E03.9] Yes             BONNIE/CKD  --IVF    Hypokalemia  --replace    Chronic SHF, with chronic edema  --scheduled to see Matthieu next week, will consult, may benefit from medication adjustments    Hx of afib, not on AC    Hyponatremia  --chronic    Encephalopathy  --seemingly better once examined, monitor, neuro checks    Severe debility  --PT/OT    Hx of BPH  --continue home meds    GERD  --PPI    COPD    Hypothyroidism per report    DVT prophylaxis:  KELLY    CODE STATUS:    Code Status and Medical Interventions:   Ordered at: 03/21/20 5162     Level Of Support Discussed With:    Patient     Code Status:    CPR     Medical Interventions (Level of Support Prior to Arrest):    Full       Admission Status:  I believe this patient meets inpatient criteria.    Electronically signed by Jeremy Blackmon MD, 03/21/20, 3:54 PM.

## 2020-03-21 NOTE — ED PROVIDER NOTES
"Subjective   The patient presents to the emergency department secondary to increasing weakness and fatigue.  Patient has history of dementia and chronic disability and is bedbound.  Patient is taken care of by his wife and home health.  Wife reports the patient has been much weaker and more altered.  She states that she is also noticed increased urinary frequency during this time.  The patient has a history of recurrent urinary tract infections.  Patient was admitted here at the end of February for renal insufficiency and weakness as well.  Patient is able to answer very basic questions but is overall quite altered and confused.      History provided by:  Patient, EMS personnel and spouse  History limited by:  Dementia and mental status change      Review of Systems   Unable to perform ROS: Mental status change   Constitutional: Negative for fever.   Respiratory: Negative.    Cardiovascular: Negative.    Gastrointestinal: Negative for diarrhea and vomiting.   Genitourinary: Positive for frequency. Negative for decreased urine volume.   Skin: Positive for wound.   Neurological: Positive for weakness.   Psychiatric/Behavioral: Positive for behavioral problems and confusion.       Past Medical History:   Diagnosis Date   • Arthritis    • Cancer (CMS/[facility])     thoat cancer, s/p chemo and XRT   • CHF (congestive heart failure) (CMS/HCC)    • COPD (chronic obstructive pulmonary disease) (CMS/HCC)    • Coronary artery disease    • Diabetes mellitus (CMS/HCC)    • Disease of thyroid gland    • Elevated cholesterol    • Gout    • History of chemotherapy    • Hyperlipidemia    • Hypertension    • Injury of back    • Low back pain    • Myocardial infarction (CMS/HCC)     x2   • PAF (paroxysmal atrial fibrillation) (CMS/HCC)    • Rotator cuff rupture    • Uses hearing aid     bilat        Allergies   Allergen Reactions   • Penicillins Rash and Other (See Comments)     \"flu-like\" symptoms. Tolerated Zosyn in ED on 1/8 admission. "   • Bactrim [Sulfamethoxazole-Trimethoprim] Nausea Only   • Lipitor [Atorvastatin] Other (See Comments) and Myalgia     Arthralgias   • Metformin Nausea Only   • Sotalol Dizziness   • Zocor [Simvastatin] Other (See Comments) and Myalgia     Arthralgias       Past Surgical History:   Procedure Laterality Date   • APPENDECTOMY     • BIVENTRICULAR ASSIST DEVICE/LEFT VENTRICULAR ASSIST DEVICE INSERTION Left    • CARDIAC CATHETERIZATION      x1 stent   • CARDIAC CATHETERIZATION N/A 6/17/2016    Procedure: Left Heart Cath;  Surgeon: Son Lam MD;  Location: [facility] CATH INVASIVE LOCATION;  Service:    • CARDIAC ELECTROPHYSIOLOGY PROCEDURE N/A 1/6/2020    Procedure: EP/ABLATION;  Surgeon: Errol Reddy MD;  Location:  [facility] EP INVASIVE LOCATION;  Service: Cardiology   • CARDIAC ELECTROPHYSIOLOGY PROCEDURE N/A 1/6/2020    Procedure: BIVENTRICULAR DEVICE INSERTION;  Surgeon: Son Sommers MD;  Location: [facility] CATH INVASIVE LOCATION;  Service: Cardiology   • CARDIAC SURGERY     • CATARACT EXTRACTION  2009   • CHOLECYSTECTOMY LAPAROSCOPIC INTRAOPERATIVE CHOLANGIOGRAM WITH LIVER BIOPSY N/A 7/8/2016    Procedure: CHOLECYSTECTOMY LAPAROSCOPIC WITH LIVER BIOPSY;  Surgeon: Son Andrade MD;  Location:  [facility] OR;  Service:    • COLONOSCOPY      x2   • CORONARY ARTERY BYPASS GRAFT     • ENDOSCOPY N/A 10/21/2019    Procedure: ESOPHAGOGASTRODUODENOSCOPY WITH BIOPSY;  Surgeon: Brunner, Mark I, MD;  Location:  [facility] ENDOSCOPY;  Service: Gastroenterology   • FOOT SURGERY  1962    right    • FRACTURE SURGERY     • LASIK     • FL ERCP DX COLLECTION SPECIMEN BRUSHING/WASHING N/A 6/24/2016    Procedure: ENDOSCOPIC RETROGRADE CHOLANGIOPANCREATOGRAPHY;  Surgeon: Omari Cardenas MD;  Location:  [facility] ENDOSCOPY;  Service: Gastroenterology   • TONSILLECTOMY     • WISDOM TOOTH EXTRACTION         Family History   Problem Relation Age of Onset   • COPD Mother    • Depression Mother    • Hypertension Mother    •  Cancer Father 40        leukemia   • No Known Problems Sister    • No Known Problems Brother    • Hearing loss Maternal Grandmother    • No Known Problems Maternal Grandfather        Social History     Socioeconomic History   • Marital status:      Spouse name: Not on file   • Number of children: Not on file   • Years of education: Not on file   • Highest education level: Not on file   Occupational History   • Occupation: DJ   Tobacco Use   • Smoking status: Former Smoker     Packs/day: 1.50     Years: 50.00     Pack years: 75.00     Types: Cigarettes     Last attempt to quit: 6/15/2010     Years since quittin.7   • Smokeless tobacco: Never Used   Substance and Sexual Activity   • Alcohol use: Yes     Frequency: Monthly or less     Drinks per session: 1 or 2     Comment: beer occasional   • Drug use: No   • Sexual activity: Defer   Social History Narrative    Patient consumes 4 diet pepsi sodas daily.     Patient lives at home with wife.            Objective   Physical Exam   Constitutional: He appears well-developed and well-nourished.   HENT:   Head: Normocephalic and atraumatic.   Airway intact.   Cardiovascular: Normal rate, regular rhythm and intact distal pulses.   Murmur (1+ systolic murmur) heard.  Pulmonary/Chest: Effort normal and breath sounds normal. No respiratory distress.   Abdominal: Soft. He exhibits no distension and no mass. There is no tenderness. There is no guarding.   Musculoskeletal: He exhibits no edema.   Neurological: He is alert.   Patient answers basic questions but is confused.  No clear unilateral deficit.  Patient is following basic commands.  Global weakness which is apparently the patient's baseline.  No clear stroke symptoms.   Skin: Skin is warm and dry.   Patient has a stage II small sacral decubitus ulcer with no evidence of associated infection.  Stage I pressure lesions of the bilateral knees and bilateral heels.   Psychiatric: He has a normal mood and affect. His  behavior is normal.   Nursing note and vitals reviewed.      Critical Care  Performed by: Alexandre Mendez MD  Authorized by: Alexandre Mendez MD     Critical care provider statement:     Critical care time (minutes):  45    Critical care time was exclusive of:  Separately billable procedures and treating other patients    Critical care was necessary to treat or prevent imminent or life-threatening deterioration of the following conditions:  Metabolic crisis    Critical care was time spent personally by me on the following activities:  Development of treatment plan with patient or surrogate, discussions with consultants, evaluation of patient's response to treatment, examination of patient, obtaining history from patient or surrogate, ordering and performing treatments and interventions, ordering and review of laboratory studies, ordering and review of radiographic studies, pulse oximetry, re-evaluation of patient's condition and review of old charts               ED Course  ED Course as of Mar 21 2126   Sat Mar 21, 2020   1423 Troponin T(!!): 0.050 [RS]   1450 Creatinine(!): 1.61 [RS]   1450 Potassium(!!): 2.1 [RS]   1508 Discussed the case with Dr. Blackmon, hospitalist, who agrees to admit. -DONNA    [NP]      ED Course User Index  [NP] Daphne Berman  [RS] Alexandre Mendez MD      Recent Results (from the past 24 hour(s))   Urinalysis With Culture If Indicated - Urine, Catheter    Collection Time: 03/21/20  1:15 PM   Result Value Ref Range    Color, UA Dark Yellow (A) Yellow, Straw    Appearance, UA Clear Clear    pH, UA 7.0 5.0 - 8.0    Specific Gravity, UA 1.011 1.001 - 1.030    Glucose, UA Negative Negative    Ketones, UA Negative Negative    Bilirubin, UA Negative Negative    Blood, UA Moderate (2+) (A) Negative    Protein,  mg/dL (2+) (A) Negative    Leuk Esterase, UA Negative Negative    Nitrite, UA Negative Negative    Urobilinogen, UA 1.0 E.U./dL 0.2 - 1.0 E.U./dL   Urinalysis,  Microscopic Only - Urine, Catheter    Collection Time: 03/21/20  1:15 PM   Result Value Ref Range    RBC, UA 0-2 None Seen, 0-2 /HPF    WBC, UA 0-2 None Seen, 0-2 /HPF    Bacteria, UA None Seen None Seen, Trace /HPF    Squamous Epithelial Cells, UA 0-2 None Seen, 0-2 /HPF    Hyaline Casts, UA 0-6 0 - 6 /LPF    Methodology Automated Microscopy    Light Blue Top    Collection Time: 03/21/20  1:24 PM   Result Value Ref Range    Extra Tube hold for add-on    Comprehensive Metabolic Panel    Collection Time: 03/21/20  1:25 PM   Result Value Ref Range    Glucose 115 (H) 65 - 99 mg/dL    BUN 31 (H) 8 - 23 mg/dL    Creatinine 1.61 (H) 0.76 - 1.27 mg/dL    Sodium 130 (L) 136 - 145 mmol/L    Potassium 2.1 (C) 3.5 - 5.2 mmol/L    Chloride 81 (L) 98 - 107 mmol/L    CO2 35.0 (H) 22.0 - 29.0 mmol/L    Calcium 9.5 8.6 - 10.5 mg/dL    Total Protein 6.9 6.0 - 8.5 g/dL    Albumin 3.40 (L) 3.50 - 5.20 g/dL    ALT (SGPT) 38 1 - 41 U/L    AST (SGOT) 142 (H) 1 - 40 U/L    Alkaline Phosphatase 275 (H) 39 - 117 U/L    Total Bilirubin 2.0 (H) 0.2 - 1.2 mg/dL    eGFR Non African Amer 42 (L) >60 mL/min/1.73    Globulin 3.5 gm/dL    A/G Ratio 1.0 g/dL    BUN/Creatinine Ratio 19.3 7.0 - 25.0    Anion Gap 14.0 5.0 - 15.0 mmol/L   Troponin    Collection Time: 03/21/20  1:25 PM   Result Value Ref Range    Troponin T 0.050 (C) 0.000 - 0.030 ng/mL   Magnesium    Collection Time: 03/21/20  1:25 PM   Result Value Ref Range    Magnesium 2.3 1.6 - 2.4 mg/dL   Green Top (Gel)    Collection Time: 03/21/20  1:25 PM   Result Value Ref Range    Extra Tube Hold for add-ons.    Lavender Top    Collection Time: 03/21/20  1:25 PM   Result Value Ref Range    Extra Tube hold for add-on    Gold Top - SST    Collection Time: 03/21/20  1:25 PM   Result Value Ref Range    Extra Tube Hold for add-ons.    CBC Auto Differential    Collection Time: 03/21/20  1:25 PM   Result Value Ref Range    WBC 8.68 3.40 - 10.80 10*3/mm3    RBC 3.74 (L) 4.14 - 5.80 10*6/mm3     Hemoglobin 10.1 (L) 13.0 - 17.7 g/dL    Hematocrit 31.2 (L) 37.5 - 51.0 %    MCV 83.4 79.0 - 97.0 fL    MCH 27.0 26.6 - 33.0 pg    MCHC 32.4 31.5 - 35.7 g/dL    RDW 18.0 (H) 12.3 - 15.4 %    RDW-SD 53.4 37.0 - 54.0 fl    MPV 8.9 6.0 - 12.0 fL    Platelets 132 (L) 140 - 450 10*3/mm3    Neutrophil % 81.1 (H) 42.7 - 76.0 %    Lymphocyte % 5.3 (L) 19.6 - 45.3 %    Monocyte % 12.7 (H) 5.0 - 12.0 %    Eosinophil % 0.0 (L) 0.3 - 6.2 %    Basophil % 0.2 0.0 - 1.5 %    Immature Grans % 0.7 (H) 0.0 - 0.5 %    Neutrophils, Absolute 7.04 (H) 1.70 - 7.00 10*3/mm3    Lymphocytes, Absolute 0.46 (L) 0.70 - 3.10 10*3/mm3    Monocytes, Absolute 1.10 (H) 0.10 - 0.90 10*3/mm3    Eosinophils, Absolute 0.00 0.00 - 0.40 10*3/mm3    Basophils, Absolute 0.02 0.00 - 0.20 10*3/mm3    Immature Grans, Absolute 0.06 (H) 0.00 - 0.05 10*3/mm3    nRBC 0.0 0.0 - 0.2 /100 WBC   Lactic Acid, Plasma    Collection Time: 03/21/20  1:25 PM   Result Value Ref Range    Lactate 2.4 (C) 0.5 - 2.0 mmol/L   Procalcitonin    Collection Time: 03/21/20  1:25 PM   Result Value Ref Range    Procalcitonin 0.31 (H) 0.10 - 0.25 ng/mL   Scan Slide    Collection Time: 03/21/20  1:25 PM   Result Value Ref Range    Hypochromia Slight/1+ None Seen    WBC Morphology Normal Normal    Platelet Morphology Normal Normal   Lactic Acid, Reflex Timer (This will reflex a repeat order 3-3:15 hours after ordered.)    Collection Time: 03/21/20  1:25 PM   Result Value Ref Range    Hold Tube Hold for add-ons.    Troponin    Collection Time: 03/21/20  3:32 PM   Result Value Ref Range    Troponin T 0.047 (C) 0.000 - 0.030 ng/mL     Note: In addition to lab results from this visit, the labs listed above may include labs taken at another facility or during a different encounter within the last 24 hours. Please correlate lab times with ED admission and discharge times for further clarification of the services performed during this visit.    XR Chest 1 View   Final Result   Persistent  cardiomegaly with increased pulmonary vascularity   and perihilar predominant pulmonary opacifications without effusion.       DICTATED:   03/21/2020   EDITED/ls :   03/21/2020        This report was finalized on 3/21/2020 4:47 PM by Dr. Scott Cabrales.            Vitals:    03/21/20 1459 03/21/20 1500 03/21/20 1530 03/21/20 1531   BP:  99/60 93/60    Pulse: 77   78   Resp:       Temp:       TempSrc:       SpO2: 98%  98% 98%   Weight:       Height:         Medications   sodium chloride 0.9 % flush 10 mL (has no administration in time range)   sodium chloride 0.9 % with KCl 40 mEq/L infusion (200 mL/hr Intravenous New Bag 3/21/20 1531)   allopurinol (ZYLOPRIM) tablet 300 mg (has no administration in time range)   amiodarone (PACERONE) tablet 200 mg (has no administration in time range)   aspirin chewable tablet 81 mg (has no administration in time range)   carvedilol (COREG) tablet 3.125 mg (has no administration in time range)   citalopram (CeleXA) tablet 20 mg (has no administration in time range)   gabapentin (NEURONTIN) capsule 600 mg (has no administration in time range)   levothyroxine (SYNTHROID, LEVOTHROID) tablet 125 mcg (has no administration in time range)   Polyvinyl Alcohol-Povidone PF (HYPOTEARS) 1.4-0.6 % ophthalmic solution 1 drop (has no administration in time range)   pantoprazole (PROTONIX) EC tablet 40 mg (has no administration in time range)   potassium chloride (MICRO-K) CR capsule 20 mEq (has no administration in time range)   rosuvastatin (CRESTOR) tablet 40 mg (has no administration in time range)   tamsulosin (FLOMAX) 24 hr capsule 0.4 mg (has no administration in time range)   thiamine (VITAMIN B-1) tablet 50 mg (has no administration in time range)   vitamin B-12 (CYANOCOBALAMIN) tablet 100 mcg (has no administration in time range)   sodium chloride 0.9 % flush 10 mL (has no administration in time range)   sodium chloride 0.9 % flush 10 mL (has no administration in time range)   heparin  (porcine) 5000 UNIT/ML injection 5,000 Units (has no administration in time range)   ondansetron (ZOFRAN) tablet 4 mg (has no administration in time range)     Or   ondansetron (ZOFRAN) injection 4 mg (has no administration in time range)   potassium chloride (MICRO-K) CR capsule 40 mEq (has no administration in time range)     Or   potassium chloride (KLOR-CON) packet 40 mEq (has no administration in time range)     Or   potassium chloride 10 mEq in 100 mL IVPB (has no administration in time range)   Magnesium Sulfate 2 gram Bolus, followed by 8 gram infusion (total Mg dose 10 grams)- Mg less than or equal to 1mg/dL (has no administration in time range)     Or   Magnesium Sulfate 2 gram / 50mL Infusion (GIVE X 3 BAGS TO EQUAL 6GM TOTAL DOSE) - Mg 1.1 - 1.5 mg/dl (has no administration in time range)     Or   Magnesium Sulfate 4 gram infusion- Mg 1.6-1.9 mg/dL (has no administration in time range)   morphine 2 MG/ML injection  - ADS Override Pull (has no administration in time range)   morphine 2 MG/ML injection  - ADS Override Pull (has no administration in time range)   EPINEPHrine PF (ADRENALIN) injection (1 mg Intravenous Given 3/21/20 1650)   magnesium sulfate injection (2 g Intravenous Given 3/21/20 1649)   amiodarone (CORDARONE) injection (150 mg Intravenous Given 3/21/20 1644)   lidocaine (cardiac) (XYLOCAINE) injection (100 mg Intravenous Given 3/21/20 1648)   Morphine sulfate (PF) injection (8 mg Intravenous Given 3/21/20 1659)     ECG/EMG Results (last 24 hours)     Procedure Component Value Units Date/Time    ECG 12 Lead [[id number]] Collected:  03/21/20 1247     Updated:  03/21/20 1248        ECG 12 Lead         ECG 12 Lead                       MDM  Number of Diagnoses or Management Options  Acute hypokalemia:   Chronic kidney disease, unspecified CKD stage:   Generalized weakness:      Amount and/or Complexity of Data Reviewed  Clinical lab tests: reviewed  Tests in the radiology section of CPT®:  reviewed  Decide to obtain previous medical records or to obtain history from someone other than the patient: yes  Obtain history from someone other than the patient: yes  Discuss the patient with other providers: yes  Independent visualization of images, tracings, or specimens: yes    Risk of Complications, Morbidity, and/or Mortality  Presenting problems: high    Critical Care  Total time providing critical care: 30-74 minutes      Final diagnoses:   Generalized weakness   Acute hypokalemia   Chronic kidney disease, unspecified CKD stage       EMR Dragon/Transcription disclaimer:   Much of this encounter note is an electronic transcription/translation of spoken language to printed text. The electronic translation of spoken language may permit erroneous, or at times, nonsensical words or phrases to be inadvertently transcribed; Although I have reviewed the note for such errors, some may still exist.        Daphne Berman  03/21/20 1514       Alexandre Mendez MD  03/21/20 0105

## 2020-03-21 NOTE — PROGRESS NOTES
[facility]  HISTORY AND PHYSICAL    Patient Name: Fidencio Zarate Jr.  : 1945  MRN: [id number]  Primary Care Physician: Deborah Henson PA-C  Date of admission: 3/21/2020      Subjective   Subjective     Chief Complaint:  Weakness    HPI:  Fidencio Zarate Jr. is a 74 y.o. male here with a past medical history of CAD s/p CABG, CKD 2, preDM, bioprosthetic AVR, afib, not on AC due to chronic anemia/TCP, history of laryngeal CA s/p treatment, severe SHF, presenting with 2 days progressive weakness. He has severe debility at baseline and is essentially restricted to bed or chair most of the time. However in the past 2 days he's too weak to sit up and gets dizzy. He hasn't been eating or drinking as well because he's been unable to sit up without worsening symptoms, but continues his diuretics as ordered.  He was found to have mild BONNIE and severe hypokalemia on his laboratory findings.     Review of Systems   Constitutional: Positive for activity change, appetite change and fatigue.   HENT: Negative.    Respiratory: Negative.    Cardiovascular: Positive for leg swelling. Negative for chest pain.        Chronic leg swelling and stable   Gastrointestinal: Negative.    Endocrine: Positive for cold intolerance.   Genitourinary: Negative.    Musculoskeletal: Negative.    Skin: Negative.    Neurological: Positive for weakness.        Chronic generalized, but worse   Psychiatric/Behavioral: Positive for confusion.        All other systems reviewed and are negative.     Personal History     Past Medical History:   Diagnosis Date   • Arthritis    • Cancer (CMS/HCC)     thoat cancer, s/p chemo and XRT   • Coronary artery disease    • Gout    • History of chemotherapy    • Hyperlipidemia    • Hypertension    • Injury of back    • Low back pain    • Myocardial infarction (CMS/HCC)     x2   • PAF (paroxysmal atrial fibrillation) (CMS/HCC)    • Rotator cuff rupture    •  Uses hearing aid     bilat        Past Surgical History:   Procedure Laterality Date   • APPENDECTOMY     • BIVENTRICULAR ASSIST DEVICE/LEFT VENTRICULAR ASSIST DEVICE INSERTION Left    • CARDIAC CATHETERIZATION      x1 stent   • CARDIAC CATHETERIZATION N/A 6/17/2016    Procedure: Left Heart Cath;  Surgeon: Son Lam MD;  Location:  [facility];  Service:    • CARDIAC ELECTROPHYSIOLOGY PROCEDURE N/A 1/6/2020    Procedure: EP/ABLATION;  Surgeon: Errol Reddy MD;  Location:  [facility];  Service: Cardiology   • CARDIAC ELECTROPHYSIOLOGY PROCEDURE N/A 1/6/2020    Procedure: BIVENTRICULAR DEVICE INSERTION;  Surgeon: Son Sommers MD;  Location:  [facility];  Service: Cardiology   • CARDIAC SURGERY     • CATARACT EXTRACTION  2009   • CHOLECYSTECTOMY LAPAROSCOPIC INTRAOPERATIVE CHOLANGIOGRAM WITH LIVER BIOPSY N/A 7/8/2016    Procedure: CHOLECYSTECTOMY LAPAROSCOPIC WITH LIVER BIOPSY;  Surgeon: Son Andrade MD;  Location:  [facility];  Service:    • COLONOSCOPY      x2   • CORONARY ARTERY BYPASS GRAFT     • ENDOSCOPY N/A 10/21/2019    Procedure: ESOPHAGOGASTRODUODENOSCOPY WITH BIOPSY;  Surgeon: Brunner, Mark I, MD;  Location:  [facility];  Service: Gastroenterology   • FOOT SURGERY  1962    right    • FRACTURE SURGERY     • LASIK     • MO ERCP DX COLLECTION SPECIMEN BRUSHING/WASHING N/A 6/24/2016    Procedure: ENDOSCOPIC RETROGRADE CHOLANGIOPANCREATOGRAPHY;  Surgeon: Omari Cardenas MD;  Location:  [facility];  Service: Gastroenterology   • TONSILLECTOMY     • WISDOM TOOTH EXTRACTION         Family History: family history includes COPD in his mother; Cancer (age of onset: 40) in his father; Depression in his mother; Hearing loss in his maternal grandmother; Hypertension in his mother; No Known Problems in his brother, maternal grandfather, and sister. Otherwise pertinent FHx was reviewed and unremarkable.     Social History:   "reports that he quit smoking about 9 years ago. His smoking use included cigarettes. He has a 75.00 pack-year smoking history. He has never used smokeless tobacco. He reports that he drinks alcohol. He reports that he does not use drugs.  Social History     Social History Narrative    Patient consumes 4 diet pepsi sodas daily.     Patient lives at home with wife.        Medications:  Available home medication information reviewed.    (Not in a hospital admission)    Allergies   Allergen Reactions   • Penicillins Rash and Other (See Comments)     \"flu-like\" symptoms. Tolerated Zosyn in ED on 1/8 admission.   • Bactrim [Sulfamethoxazole-Trimethoprim] Nausea Only   • Lipitor [Atorvastatin] Other (See Comments) and Myalgia     Arthralgias   • Metformin Nausea Only   • Sotalol Dizziness   • Zocor [Simvastatin] Other (See Comments) and Myalgia     Arthralgias       Objective   Objective     Vital Signs:   Temp:  [98.5 °F (36.9 °C)] 98.5 °F (36.9 °C)  Heart Rate:  [77-80] 78  Resp:  [18] 18  BP: ()/(60) 93/60        Physical Exam   NAD, alert and oriented  OP clear, dry MM  Neck supple  No LAD  Speech clear and fluent, face symmetric  RRR with 1/6 systolic murmur  Decreased at bases, otherwise clear  +BS, ND, NT, soft  No c/c, 2+ B LE edema, pitting an nontender to proximal tibia  Mild Stage I pressure sores B knees/heels  Answers questions appropriately, follows commands  Flat affect    Results Reviewed:  I have personally reviewed current lab and radiology data.    Results from last 7 days   Lab Units 03/21/20  1325   WBC 10*3/mm3 8.68   HEMOGLOBIN g/dL 10.1*   HEMATOCRIT % 31.2*   PLATELETS 10*3/mm3 132*     Results from last 7 days   Lab Units 03/21/20  1325   SODIUM mmol/L 130*   POTASSIUM mmol/L 2.1*   CHLORIDE mmol/L 81*   CO2 mmol/L 35.0*   BUN mg/dL 31*   CREATININE mg/dL 1.61*   GLUCOSE mg/dL 115*   CALCIUM mg/dL 9.5   ALT (SGPT) U/L 38   AST (SGOT) U/L 142*   TROPONIN T ng/mL 0.050*   LACTATE mmol/L 2.4*   "   PROCALCITONIN ng/mL 0.31*     Estimated Creatinine Clearance: 44.3 mL/min (A) (by C-G formula based on SCr of 1.61 mg/dL (H)).  Brief Urine Lab Results  (Last result in the past 365 days)      Color   Clarity   Blood   Leuk Est   Nitrite   Protein   CREAT   Urine HCG        03/21/20 1315 Dark Yellow Clear Moderate (2+) Negative Negative 100 mg/dL (2+)             Imaging Results (Last 24 Hours)     Procedure Component Value Units Date/Time    XR Chest 1 View [[id number]] Collected:  03/21/20 1334     Updated:  03/21/20 1338    Narrative:          EXAMINATION: XR CHEST 1 VW-      INDICATION: Weak/Dizzy/AMS triage protocol      COMPARISON: Chest x-ray 02/26/2020     FINDINGS: Cardiomegaly with increased pulmonary vascularity similar to  prior comparison as there is central perihilar predominant pulmonary  opacifications without pneumothorax or effusion. Left chest wall pacing  device with leads intact. Degenerative changes of the spine and  shoulders right greater than left.           Impression:       Persistent cardiomegaly with increased pulmonary vascularity  and perihilar predominant pulmonary opacifications without effusion.              Results for orders placed during the hospital encounter of 02/24/20   Adult Transthoracic Echo Complete W/ Cont if Necessary Per Protocol    Narrative · The left ventricular cavity is mildly dilated.  · Left ventricular systolic function is severely decreased.  · Right ventricular cavity is borderline dilated.  · Left atrial cavity size is severely dilated.  · Mild-to-moderate mitral valve regurgitation is present  · Calcific nodularity of aortic valve without outflow obstruction  · Inferior wall and posterior wall hypokinetic          Assessment/Plan   Assessment & Plan     Active Hospital Problems    Diagnosis POA   • **Generalized weakness [R53.1] Yes   • Stage 3 chronic kidney disease (CMS/HCC) [N18.3] Yes   • Altered mental state [R41.82] Yes   • Hyponatremia [E87.1] Yes    • Acute on chronic combined systolic and diastolic congestive heart failure (CMS/[facility]) [I50.43] Yes     · Echo (10/6/2016): LVEF 50%  · Echo (3/6/2019): Reduced LVEF 40%.  Grade 2 diastolic dysfunction.  Moderate dilation of the aortic root at the sinuses of Valsalva present. Left ventricular wall segments are hypokinetic: apical lateral, basal inferolateral, mid inferolateral, apical inferior, basal inferoseptal, apex hypokinetic and basal inferoseptal. The following left ventricular wall segments are akinetic: basal inferior and mid inferior.  · Presentation to [facility] 10/19/2019 with elevated proBNP in setting of GI bleed, acute kidney injury with elevated creatinine and non-STEMI     • BPH with obstruction/lower urinary tract symptoms [N40.1, N13.8] Yes   • Gastroesophageal reflux disease [K21.9] Yes   • Hypokalemia [E87.6] Unknown   • COPD (chronic obstructive pulmonary disease) (CMS/[facility]) [J44.9] Yes   • Hypothyroidism (acquired) [E03.9] Yes             BONNIE/CKD  --IVF    Hypokalemia  --replace    Chronic SHF, with chronic edema  --scheduled to see Matthieu next week, will consult, may benefit from medication adjustments    Hx of afib, not on AC    Hyponatremia  --chronic    Encephalopathy  --seemingly better once examined, monitor, neuro checks    Severe debility  --PT/OT    Hx of BPH  --continue home meds    GERD  --PPI    COPD    Hypothyroidism per report    DVT prophylaxis:  KELLY    CODE STATUS:    Code Status and Medical Interventions:   Ordered at: 03/21/20 7084     Level Of Support Discussed With:    Patient     Code Status:    CPR     Medical Interventions (Level of Support Prior to Arrest):    Full       Admission Status:  I believe this patient meets inpatient criteria.    Electronically signed by Jeremy Blackmon MD, 03/21/20, 3:54 PM.

## 2020-03-22 NOTE — DISCHARGE SUMMARY
[facility]  DEATH SUMMARY    Patient Name: Fidencio Zarate Jr.  : 1945  MRN: [id number]    Date of Admission: 3/21/2020  Date of Death:  3/21/2020  Time of Death:  1653    Primary Care Physician: Deborah Henson PA-C    Consults     No orders found for last 30 day(s).          Summary of Hospital Events     Presenting Problem:   Generalized weakness [R53.1]    Active Hospital Problems    Diagnosis  POA   • **Generalized weakness [R53.1]  Yes   • Stage 3 chronic kidney disease (CMS/HCC) [N18.3]  Yes   • Altered mental state [R41.82]  Yes   • Hyponatremia [E87.1]  Yes   • Acute on chronic combined systolic and diastolic congestive heart failure (CMS/HCC) [I50.43]  Yes   • BPH with obstruction/lower urinary tract symptoms [N40.1, N13.8]  Yes   • Gastroesophageal reflux disease [K21.9]  Yes   • Hypokalemia [E87.6]  Unknown   • COPD (chronic obstructive pulmonary disease) (CMS/HCC) [J44.9]  Yes   • Hypothyroidism (acquired) [E03.9]  Yes      Resolved Hospital Problems   No resolved problems to display.          Hospital Course:  Fidencio Zarate Jr. was a 74 y.o. male that presented here with severe weakness and was found to have mild BONNIE and severe hypokalemia. Shortly after arriving to the floor the patient had a rhythm change. He was found to be in ventricular fibrillation. CODE BLUE was initiated and the patient received CPR and defibrillation, and despite exhaustive resuscitative efforts, he remained in ventricular fibrillation and succumbed to his fatal arrhythmia on 3/21/20 at 1653.  We discussed withdrawal of care with his wife/POA and she was in agreement.      Official Cause of Death and any directly related diagnoses:  Ventricular fibrillation in the setting of severe systolic heart failure and acute hypokalemia.       Electronically signed by Jeremy Blackmon MD, 20, 2:08 PM.

## 2020-03-26 ENCOUNTER — TELEPHONE (OUTPATIENT)
Dept: FAMILY MEDICINE CLINIC | Facility: CLINIC | Age: 75
End: 2020-03-26

## 2020-03-26 LAB — BACTERIA SPEC AEROBE CULT: NORMAL

## 2020-03-26 NOTE — TELEPHONE ENCOUNTER
Called and left message for wife.  So sorry to hear about her 's passing.  If she needs anything, please let me know.

## 2020-03-28 LAB
BACTERIA SPEC AEROBE CULT: ABNORMAL
GRAM STN SPEC: ABNORMAL

## 2020-04-14 NOTE — PROGRESS NOTES
Subjective:     Encounter Date:08/09/2017      Patient ID: Fidencio Zarate Jr. is a 71 y.o.  white male, retired DJ, from [place], [place].      INTERNIST: Marlo King MD, [facility]  ONCOLOGIST: Fawad Zelaya MD  OTOLARYNGOLOGIST: Barrie Solano MD  RADIATION ONCOLOGIST: Kenneth Rodriguez MD  PAIN MANAGEMENT: Toni Pollock MD  SURGEON: Son Andrade MD  INTERVENTIONAL CARDIOLOGIST: Son Lam MD, [facility], [facility]    Chief Complaint:   Chief Complaint   Patient presents with   • Atrial Fibrillation     Problem List:  1. Ischemic heart disease:  a. Remote acute inferolateral myocardial infarction with apparent severe single vessel obstructive coronary disease and preserved left ventricular function--data deficit, October 1984.   b. Recurrent myocardial infarction--data deficit, 1989 with subsequent recurrent progressive chest pain syndrome and aortocoronary bypass graft placement x6--data deficit, January 1990.  c. Remote acceptable nondiagnostic GXT without symptoms, March 1995.   d. Progressive chest pain syndrome with diagnostic coronary arteriography demonstrating 3-vessel obstructive coronary disease with proximal segment occlusion with incomplete visualization of the LIMA-LAD and high grade stenosis of the vein graft of the first diagonal branch of the left anterior descending artery with occluded vein graft to the left circumflex coronary artery with patent graft to the distal PDA with mild left ventricular enlargement and mild-moderate inferobasal hypokinesis with angioplasty and stent deployment of the diagonal branch of the LAD, [facility], May 2007.  e. Abnormal acceptable combination Doppler echocardiogram, LVEF (0.55) with abnormal acceptable 24-hour Holter monitor, autumn 2009.  f. Recent progressive CCS class II-III chest pain syndrome with NYHA class II exertional dyspnea and diagnostic coronary angiography demonstrating normal left ventricular function with severe  three vessel obstructive disease and patent LIMA-LAD with patent stents vein graft to the D1-LAD and patent vein graft to the RCA with chronically occluded circumflex vein graft with adequate right and left collaterals of the left circumflex coronary artery with coronary anatomy remaining unchanged from remote intervention, 2007.   g. Left heart cath, 06/17/2016, Dr. Son Lam, with normal hemodynamics, moderate reduction in LVEF (0.40) with severe 3 vessel obstructive coronary disease with occlusion of all coronary arteries and patent LIMA and SVG x2 with continued medical therapy felt warranted.  h. Echocardiogram 10/6/2016; LVEF 0.50, LV wall segment contract abnormally, RV moderately dilated, LA moderately dilated, mild concentric hypertrophy, LV moderately dilated, no evidence of pericardial effusion, RVSP less than 35 mmHg  i. Residual class I symptoms.  2. Chronic hypertension--probably essential with recent labile blood pressure readings.   3. Dyslipidemia.   4. Chronic tobacco abuse.  5. Mild obesity, BMI 31.1.  6. Chronic lower tract obstructive symptoms--probable BPH.  7. Hyperuricemia with intermittent gout.   8. Vitamin D deficiency.   9. Chronic low back pain with neurosurgical evaluation.   10. Erectile dysfunction.   11. Elevated serum glucose--possible type 2 pre-diabetes mellitus; hemoglobin A1c 5.5%, June 2016.  12. Remote operations:  a. Right foot surgery, 1962.   b. Appendectomy.  c. Coronary artery bypass graft x6 - 1990.   d. Cataract removal with lens implant, [facility], 2009.  e. Cholecystectomy July 2016  13. Remote left neck mass with abnormal PET scan demonstrating 3.5 cm. x 2.5 cm. mass with adjacent 9 mm lymph node, hypermetabolic, with contemplated radiation/chemotherapy vs. resection, January 2011.  14. Noncompliance, resolved.   15. Remote progressive disabling headache syndrome felt to be secondary to cluster headaches with headache specialist evaluation, MRI and ophthalmology  "assessment x2 - data deficit, June-December 2015.  16. Remote progressive nausea and chest pain syndrome with apparent acceptable diagnostic coronary angiography and ERCP followed by laparoscopic cholecystectomy and liver biopsy, 07/08/2016.      Allergies   Allergen Reactions   • Penicillins Rash and Other (See Comments)     \"flu-like\" symptoms   • Crestor [Rosuvastatin Calcium] Other (See Comments) and Myalgia     Arthralgias   • Lipitor [Atorvastatin] Other (See Comments) and Myalgia     Arthralgias   • Zocor [Simvastatin] Other (See Comments) and Myalgia     Arthralgias         Current Outpatient Prescriptions:   •  allopurinol (ZYLOPRIM) 300 MG tablet, Take 300 mg by mouth daily., Disp: , Rfl:   •  amLODIPine (NORVASC) 10 MG tablet, Take 10 mg by mouth daily., Disp: , Rfl:   •  apixaban (ELIQUIS) 5 MG tablet tablet, Take 1 tablet by mouth 2 (Two) Times a Day., Disp: 180 tablet, Rfl: 3  •  aspirin 81 MG EC tablet, Take 81 mg by mouth Daily., Disp: , Rfl:   •  Aspirin Effervescent (JOÃO-SELTZER PO), Take  by mouth As Needed., Disp: , Rfl:   •  Cholecalciferol (VITAMIN D3) 5000 UNITS capsule capsule, Take 5,000 Units by mouth daily., Disp: , Rfl:   •  citalopram (CeleXA) 20 MG tablet, Take 20 mg by mouth daily., Disp: , Rfl:   •  fentaNYL (DURAGESIC) 75 MCG/HR patch, Place 1 patch on the skin every third day., Disp: , Rfl:   •  gabapentin (NEURONTIN) 300 MG capsule, Take 300 mg by mouth 3 (three) times a day., Disp: , Rfl:   •  hydrochlorothiazide (HYDRODIURIL) 25 MG tablet, Take 25 mg by mouth daily., Disp: , Rfl:   •  HYDROcodone-acetaminophen (LORTAB)  MG per tablet, Take 1 tablet by mouth every 6 (six) hours as needed for moderate pain (4-6)., Disp: , Rfl:   •  levothyroxine (SYNTHROID, LEVOTHROID) 50 MCG tablet, Take 50 mcg by mouth daily., Disp: , Rfl:   •  LUBRICANT EYE DROPS 0.4-0.3 % solution ophthalmic solution, Administer 1 drop to both eyes at night as needed., Disp: , Rfl:   •  metoprolol " succinate XL (TOPROL-XL) 25 MG 24 hr tablet, Take 1 tablet by mouth Daily., Disp: 90 tablet, Rfl: 3  •  naproxen sodium (ALEVE) 220 MG tablet, Take 220 mg by mouth Daily As Needed for Mild Pain (1-3)., Disp: , Rfl:   •  nitroglycerin (NITROSTAT) 0.4 MG SL tablet, Place 0.4 mg under the tongue as needed., Disp: , Rfl:   •  omeprazole (PriLOSEC) 20 MG capsule, Take 20 mg by mouth daily., Disp: , Rfl:   •  pravastatin (PRAVACHOL) 80 MG tablet, Take 1 tablet by mouth Daily., Disp: 90 tablet, Rfl: 2  •  tamsulosin (FLOMAX) 0.4 MG capsule 24 hr capsule, Take 1 capsule by mouth every night., Disp: , Rfl:   •  vitamin B-12 (CYANOCOBALAMIN) 1000 MCG tablet, Take 1,000 mcg by mouth daily., Disp: , Rfl:   •  vitamin C (ASCORBIC ACID) 500 MG tablet, Take 1,000 mg by mouth Daily., Disp: , Rfl:     History of Present Illness Patient returns for scheduled 6-month followup.  He denies any chest discomfort, shortness of breath presyncope, edema, palpitations.  He is concerned because he has nightly sweats.  Also he has gained 15 pounds in the past month.  He is scheduled to see his physician mid-September 2017 for more laboratory testing.  He has back pain and has gotten 3 back injections, but the person he was seeing has now retired and not been replaced.  He is generally comfortable on limited activity on a daily basis; he has no tobacco or NTG use and no chills or GI//pulmonary complaints currently.  Patient otherwise denies chest pain, shortness of breath, PND, edema, palpitations, syncope or presyncope at this time.        Review of Systems   Constitution: Positive for night sweats and weight gain.   HENT: Positive for tinnitus.    Hematologic/Lymphatic: Bruises/bleeds easily.   Musculoskeletal: Positive for arthritis, back pain and gout.   Genitourinary: Positive for frequency.   Neurological: Positive for difficulty with concentration.      Obtained and otherwise negative except as outlined in problem list and  "HPI.    Procedures       Objective:       Vitals:    08/09/17 1305 08/09/17 1311   BP: 158/74 153/70   BP Location: Left arm Left arm   Patient Position: Sitting Standing   Pulse: 54 55   Weight: 210 lb 12.8 oz (95.6 kg)    Height: 69\" (175.3 cm)    Recheck blood pressure left arm sitting was 124/60  Body mass index is 31.13 kg/(m^2).   Last weight:  198 lbs.    Physical Exam   Constitutional: He is oriented to person, place, and time. He appears well-developed and well-nourished.   Neck: No JVD present. Carotid bruit is not present. No thyromegaly present.   Cardiovascular: Regular rhythm, S1 normal and S2 normal.  Exam reveals no gallop, no S3 and no friction rub.    Murmur heard.   Medium-pitched harsh early systolic murmur is present with a grade of 2/6  at the upper left sternal border radiating to the neck  Pulses:       Carotid pulses are 1+ on the right side, and 1+ on the left side.       Radial pulses are 1+ on the right side, and 1+ on the left side.        Femoral pulses are 1+ on the right side, and 1+ on the left side.       Popliteal pulses are 1+ on the right side, and 1+ on the left side.        Dorsalis pedis pulses are 1+ on the right side, and 1+ on the left side.        Posterior tibial pulses are 1+ on the right side, and 1+ on the left side.   Pulmonary/Chest: Effort normal. He has decreased breath sounds. He has no wheezes. He has no rhonchi. He has no rales.   Abdominal: Soft. He exhibits no mass. There is no hepatosplenomegaly. There is no tenderness. There is no guarding.   Lymphadenopathy:     He has no cervical adenopathy.   Neurological: He is alert and oriented to person, place, and time.   Skin: Skin is warm, dry and intact. No rash noted.   Vitals reviewed.      Lab Review:   Lab Results   Component Value Date    GLUCOSE 129 (H) 04/14/2017    BUN 14 04/14/2017    CREATININE 1.20 04/14/2017    EGFRIFNONA 60 (L) 04/14/2017    BCR 11.7 04/14/2017    CO2 30.0 04/14/2017    CALCIUM 9.7 " 04/14/2017    ALBUMIN 4.50 04/07/2017    LABIL2 1.5 04/07/2017    AST 23 04/07/2017    ALT 5 (L) 04/07/2017       Lab Results   Component Value Date    WBC 8.29 04/07/2017    HGB 12.1 (L) 04/07/2017    HCT 37.5 (L) 04/07/2017    MCV 94.5 04/07/2017     04/07/2017       Lab Results   Component Value Date    HGBA1C 5.50 06/16/2016       Lab Results   Component Value Date    TSH 1.799 06/16/2016       Lab Results   Component Value Date    CHOL 151 06/16/2016     Lab Results   Component Value Date    TRIG 85 06/16/2016     Lab Results   Component Value Date    HDL 46 06/16/2016         Assessment:   Overall continued acceptable course with no interim cardiopulmonary complaints with acceptable functional status. We will defer additional diagnostic or therapeutic intervention from a cardiac perspective at this time.       Diagnosis Plan   1. Atherosclerosis of coronary artery bypass graft of native heart without angina pectoris  Stable   2. Essential hypertension  Controlled          Plan:         1. Patient to continue current medications and close follow up with the above providers.  2. Tentative cardiology follow up in 6 months or patient may return sooner PRN.      Scribed for Darien Rodrigues MD by Reyna Gonzalez, APRN. 8/9/2017  2:04 PM    I, Darien Rodrigues MD, [facility], personally performed the services described in this documentation as scribed by the above named individual in my presence, and it is both accurate and complete. At 2:10 PM on 08/09/2017     Attending Attestation (For Attendings USE Only)...

## 2025-08-05 NOTE — TELEPHONE ENCOUNTER
Called and reviewed lab results.    Pt reports he has lost 5 lbs.    Lasix 80 mg tomarrow, then continue Lasix 40 mg daily.    F/u as scheduled.     Office Visit on 01/28/2020   Component Date Value Ref Range Status   • Glucose 01/28/2020 118* 65 - 99 mg/dL Final   • BUN 01/28/2020 17  8 - 23 mg/dL Final   • Creatinine 01/28/2020 1.07  0.76 - 1.27 mg/dL Final   • Sodium 01/28/2020 130* 136 - 145 mmol/L Final   • Potassium 01/28/2020 4.5  3.5 - 5.2 mmol/L Final   • Chloride 01/28/2020 90* 98 - 107 mmol/L Final   • CO2 01/28/2020 25.2  22.0 - 29.0 mmol/L Final   • Calcium 01/28/2020 9.3  8.6 - 10.5 mg/dL Final   • eGFR Non  Amer 01/28/2020 68  >60 mL/min/1.73 Final   • BUN/Creatinine Ratio 01/28/2020 15.9  7.0 - 25.0 Final   • Anion Gap 01/28/2020 14.8  5.0 - 15.0 mmol/L Final   • proBNP 01/28/2020 4,692.0* 5.0 - 900.0 pg/mL Final   • WBC 01/28/2020 4.15  3.40 - 10.80 10*3/mm3 Final   • RBC 01/28/2020 3.56* 4.14 - 5.80 10*6/mm3 Final   • Hemoglobin 01/28/2020 10.0* 13.0 - 17.7 g/dL Final   • Hematocrit 01/28/2020 31.5* 37.5 - 51.0 % Final   • MCV 01/28/2020 88.5  79.0 - 97.0 fL Final   • MCH 01/28/2020 28.1  26.6 - 33.0 pg Final   • MCHC 01/28/2020 31.7  31.5 - 35.7 g/dL Final   • RDW 01/28/2020 16.8* 12.3 - 15.4 % Final   • RDW-SD 01/28/2020 53.4  37.0 - 54.0 fl Final   • MPV 01/28/2020 9.3  6.0 - 12.0 fL Final   • Platelets 01/28/2020 136* 140 - 450 10*3/mm3 Final   • Neutrophil % 01/28/2020 69.1  42.7 - 76.0 % Final   • Lymphocyte % 01/28/2020 12.3* 19.6 - 45.3 % Final   • Monocyte % 01/28/2020 14.7* 5.0 - 12.0 % Final   • Eosinophil % 01/28/2020 2.2  0.3 - 6.2 % Final   • Basophil % 01/28/2020 1.2  0.0 - 1.5 % Final   • Immature Grans % 01/28/2020 0.5  0.0 - 0.5 % Final   • Neutrophils, Absolute 01/28/2020 2.87  1.70 - 7.00 10*3/mm3 Final   • Lymphocytes, Absolute 01/28/2020 0.51* 0.70 - 3.10 10*3/mm3 Final   • Monocytes, Absolute 01/28/2020 0.61  0.10 - 0.90 10*3/mm3 Final   • Eosinophils, Absolute  Stable on Zoloft 100 mg daily.   01/28/2020 0.09  0.00 - 0.40 10*3/mm3 Final   • Basophils, Absolute 01/28/2020 0.05  0.00 - 0.20 10*3/mm3 Final   • Immature Grans, Absolute 01/28/2020 0.02  0.00 - 0.05 10*3/mm3 Final   • nRBC 01/28/2020 0.0  0.0 - 0.2 /100 WBC Final

## (undated) DEVICE — Device: Brand: EZ STEER

## (undated) DEVICE — CATH EP 6FR

## (undated) DEVICE — DECANT BG O JET

## (undated) DEVICE — SPNG ENDO BEDSIDE TUB ENZYM

## (undated) DEVICE — SYR LUERLOK 50ML

## (undated) DEVICE — INTRO SHEATH ENGAGE W/50 GW .038 7F12

## (undated) DEVICE — LEX CATH LAB MINOR: Brand: MEDLINE INDUSTRIES, INC.

## (undated) DEVICE — BALN PRESS WEDGE 6F 110CM

## (undated) DEVICE — GUIDE CATHETER: Brand: ACUITY® PRO

## (undated) DEVICE — INTRO ACCSR BLNT TP

## (undated) DEVICE — DRSNG SURESITE123 4X4.8IN

## (undated) DEVICE — DRSNG SURG AQUACEL AG 9X15CM

## (undated) DEVICE — SOL IRR H2O BG 1000ML STRL

## (undated) DEVICE — KT BIOGUARD SXN VLV AIR/H20 4PC DISP

## (undated) DEVICE — HYBRID CO2 TUBING/CAP SET FOR OLYMPUS® SCOPES & CO2 SOURCE: Brand: ERBE

## (undated) DEVICE — GW LUGE .014 182 CM

## (undated) DEVICE — INTRO PEELAWAY SAFESHEATH II HEMO10F23CM

## (undated) DEVICE — SINGLE-USE BIOPSY FORCEPS: Brand: RADIAL JAW 4

## (undated) DEVICE — LEX ELECTRO PHYSIOLOGY: Brand: MEDLINE INDUSTRIES, INC.

## (undated) DEVICE — CONTN GRAD MEAS TRIANG 32OZ BLK

## (undated) DEVICE — JELLY,LUBE,STERILE,FLIP TOP,TUBE,2-OZ: Brand: MEDLINE

## (undated) DEVICE — CANN NASL CO2 DIVIDED A/

## (undated) DEVICE — ADULT, W/LG. BACK PAD, RADIOTRANSPARENT ELEMENT AND LEAD WIRE: Brand: DEFIBRILLATION ELECTRODES

## (undated) DEVICE — TUBING, SUCTION, 1/4" X 10', STRAIGHT: Brand: MEDLINE

## (undated) DEVICE — DRSNG SURESITE WNDW 2.38X2.75

## (undated) DEVICE — INTRO TEAR AWAY/LVD W/SD PRT 6F 13CM

## (undated) DEVICE — THE BITE BLOCK MAXI, LATEX FREE STRAP IS USED TO PROTECT THE ENDOSCOPE INSERTION TUBE FROM BEING BITTEN BY THE PATIENT.